# Patient Record
Sex: FEMALE | Race: WHITE | NOT HISPANIC OR LATINO | Employment: FULL TIME | URBAN - METROPOLITAN AREA
[De-identification: names, ages, dates, MRNs, and addresses within clinical notes are randomized per-mention and may not be internally consistent; named-entity substitution may affect disease eponyms.]

---

## 2017-02-08 ENCOUNTER — ALLSCRIPTS OFFICE VISIT (OUTPATIENT)
Dept: OTHER | Facility: OTHER | Age: 62
End: 2017-02-08

## 2017-02-08 DIAGNOSIS — E11.9 TYPE 2 DIABETES MELLITUS WITHOUT COMPLICATIONS (HCC): ICD-10-CM

## 2017-05-16 ENCOUNTER — ALLSCRIPTS OFFICE VISIT (OUTPATIENT)
Dept: OTHER | Facility: OTHER | Age: 62
End: 2017-05-16

## 2017-05-16 DIAGNOSIS — E11.9 TYPE 2 DIABETES MELLITUS WITHOUT COMPLICATIONS (HCC): ICD-10-CM

## 2017-09-27 ENCOUNTER — ALLSCRIPTS OFFICE VISIT (OUTPATIENT)
Dept: OTHER | Facility: OTHER | Age: 62
End: 2017-09-27

## 2017-09-27 DIAGNOSIS — E78.5 HYPERLIPIDEMIA: ICD-10-CM

## 2017-09-27 DIAGNOSIS — K21.9 GASTRO-ESOPHAGEAL REFLUX DISEASE WITHOUT ESOPHAGITIS: ICD-10-CM

## 2017-09-27 DIAGNOSIS — F41.9 ANXIETY DISORDER: ICD-10-CM

## 2017-09-27 DIAGNOSIS — G25.81 RESTLESS LEGS SYNDROME: ICD-10-CM

## 2017-09-27 DIAGNOSIS — E11.9 TYPE 2 DIABETES MELLITUS WITHOUT COMPLICATIONS (HCC): ICD-10-CM

## 2017-11-02 ENCOUNTER — ALLSCRIPTS OFFICE VISIT (OUTPATIENT)
Dept: OTHER | Facility: OTHER | Age: 62
End: 2017-11-02

## 2017-11-02 DIAGNOSIS — E11.9 TYPE 2 DIABETES MELLITUS WITHOUT COMPLICATIONS (HCC): ICD-10-CM

## 2017-11-03 NOTE — PROGRESS NOTES
Assessment  1  Diabetes (250 00) (E11 9)   2  Hyperlipidemia (272 4) (E78 5)   3  Diabetes mellitus (250 00) (E11 9)   4  Anxiety (300 00) (F41 9)    Plan  Diabetes    · * MAMMO SCREENING BILATERAL W CAD; Status:Hold For - Scheduling; Requested  for:02Nov2017;    · *VB - Eye Exam; Status:Active; Requested BCP:[id number];   PMH: Need for influenza vaccination    · Influenza ; every 6 months; Last 34BVF6832; Next 10VPP1122; Status:Active    Discussion/Summary    Continue regimen  No changes madein 2 months for diabetes follow-up with labs  Labs were ready printed and given to patient at last visit  ready has mammogram scheduled this month and we will received copy of the results  The patient was counseled regarding instructions for management,-- risk factor reductions,-- prognosis,-- impressions,-- risks and benefits of treatment options  total time of encounter was 15 minutes  Possible side effects of new medications were reviewed with the patient/guardian today  The treatment plan was reviewed with the patient/guardian  The patient/guardian understands and agrees with the treatment plan      Chief Complaint  Patient here for follow up screening neg      History of Present Illness  follow-uplast visit patient was switch from Xanax extended-release 2 clonazepam  She was complaining of a lot of dry mouth with Xanax this has now resolved with switching medicine  She is happy with the new medicine tolerating well on controlling her symptoms  no changes      Review of Systems    Constitutional: No fever, no chills, feels well, no tiredness, no recent weight gain or weight loss  Cardiovascular: No complaints of slow heart rate, no fast heart rate, no chest pain, no palpitations, no leg claudication, no lower extremity edema  Respiratory: No complaints of shortness of breath, no wheezing, no cough, no SOB on exertion, no orthopnea, no PND     Gastrointestinal: No complaints of abdominal pain, no constipation, no nausea or vomiting, no diarrhea, no bloody stools  Active Problems  1  Anxiety (300 00) (F41 9)   2  Cervical cancer screening (V76 2) (Z12 4)   3  Diabetes mellitus (250 00) (E11 9)   4  Diabetic eye exam (V72 0,250 00) (E11 9,Z01 00)   5  Eczema (692 9) (L30 9)   6  Encounter for annual routine gynecological examination (V72 31) (Z01 419)   7  Encounter for screening colonoscopy (V76 51) (Z12 11)   8  Encounter for screening mammogram for breast cancer (V76 12) (Z12 31)   9  Gastroesophageal reflux disease (530 81) (K21 9)   10  Hot flashes (627 2) (N95 1)   11  Hyperlipidemia (272 4) (E78 5)   12  Hypertension associated with diabetes (250 80,401 9) (E11 59,I10)   13  Leg cramps (729 82) (R25 2)   14  Need for influenza vaccination (V04 81) (Z23)   15  Need for Tdap vaccination (V06 1) (Z23)   16  Restless legs syndrome (333 94) (G25 81)   17  Skin Abscess Of Axilla (682 3)   18  Skin rash (782 1) (R21)    Past Medical History  1  Diabetes mellitus (250 00) (E11 9)   2  History of Gallbladder disease (575 9) (K82 9)   3  History of constipation (V12 79) (Z87 19)   4  History of insomnia (V13 89) (Z87 898)   5  History of Lives with    6  History of Need for influenza vaccination (V04 81) (Z23)   7  History of Patient has living will (V49 89) (Z78 9)    The active problems and past medical history were reviewed and updated today  Surgical History  1  History of Cholecystectomy   2  History of Nasal Septal Deviation Repair    Family History  Father    1  Family history of Acute Myocardial Infarction (V17 3)  Sister    2  Family history of Emphysema   3  Family history of colonic polyps (V18 51) (Z83 71)   4   Family history of malignant neoplasm (V16 9) (Z80 9)    Social History   · Always uses seat belt   · Being A Social Drinker   · Caffeine use (V49 89) (F15 90)   · Marital History - Currently    · Never A Smoker   · Never Used Drugs   · No illicit drug use   · No known exposure to tobacco smoke   · Uses Safety Equipment - Seatbelts    Current Meds   1  Adult Aspirin EC Low Strength 81 MG Oral Tablet Delayed Release; TAKE 1 TABLET   DAILY; Therapy: [phone]) to Recorded   2  Atorvastatin Calcium 20 MG Oral Tablet; TAKE 1 TABLET DAILY AS DIRECTED; Therapy: 99Ytt4864 to (Evaluate:12Nov2017)  Requested for: 95YXM5133; Last   Rx:61Hdc4519 Ordered   3  Betamethasone Dipropionate Aug 0 05 % External Ointment; APPLY SPARINGLY TO   AFFECTED AREA(S) ONCE DAILY; Therapy: 28VHY0412 to (Last Rx:06Oct2016)  Requested for: 75KVA2052 Ordered   4  Black Cohosh 40 MG Oral Capsule; TAKE 1 CAPSULE TWICE DAILY; Therapy: 80NCC4929 to (Evaluate:06Jan2016)  Requested for: 09DUU4493; Last   Rx:85Jcm6796 Ordered   5  ClonazePAM 0 5 MG Oral Tablet; TAKE 1 TABLET 1-2 times  DAILY as needed; Therapy: 09FAG5814 to (Last Rx:27Sep2017) Ordered   6  Irbesartan 150 MG Oral Tablet; TAKE 1 TABLET DAILY AS DIRECTED; Therapy: 29Jbo5508 to (Evaluate:03Feb2018)  Requested for: 49NXJ6433; Last   Rx:23Abn9259 Ordered   7  MetFORMIN HCl - 500 MG Oral Tablet; TAKE 1 TABLET BY MOUTH IN THE AM AND 2   TABS IN THE PM;   Therapy: 18Ohl5311 to (Evaluate:12May2018)  Requested for: 70TIO4182; Last   Rx:84Ujy4275 Ordered   8  Montelukast Sodium 10 MG Oral Tablet; TAKE 1 TABLET DAILY  Requested for:   98VJW2653; Last Rx:16May2017 Ordered   9  Omeprazole 40 MG Oral Capsule Delayed Release; TAKE 1 CAPSULE BY MOUTH   EVERY DAY; Therapy: 90PNY7305 to (Evaluate:12Nov2017)  Requested for: 64VKW6740; Last   Rx:16May2017 Ordered   10  ROPINIRole HCl - 2 MG Oral Tablet; TAKE 1 TABLET AT BEDTIME; Therapy: (Recorded:27Sep2017) to Recorded    The medication list was reviewed and updated today  Allergies  1  Keflex TABS  2   Tape    Vitals  Vital Signs    Recorded: 08XGI1625 05:14PM   Temperature 97 4 F   Heart Rate 97   Respiration 20   Systolic 853   Diastolic 80   Height 5 ft 8 9 in   Weight 198 lb    BMI Calculated 29 32   BSA Calculated 2 05   O2 Saturation 99   Pain Scale 4     Physical Exam    Constitutional   General appearance: No acute distress, well appearing and well nourished  Pulmonary   Respiratory effort: No increased work of breathing or signs of respiratory distress  Auscultation of lungs: Clear to auscultation  Cardiovascular   Auscultation of heart: Normal rate and rhythm, normal S1 and S2, without murmurs  Examination of extremities for edema and/or varicosities: Normal     Psychiatric   Mood and affect: Normal          Results/Data  PHQ-2 Adult Depression Screening 04IZU4131 05:21PM User, s     Test Name Result Flag Reference   PHQ-2 Adult Depression Score 0     Over the last two weeks, how often have you been bothered by any of the following problems? Little interest or pleasure in doing things: Not at all - 0  Feeling down, depressed, or hopeless: Not at all - 0   PHQ-2 Adult Depression Screening Negative         Health Management  Encounter for screening colonoscopy   COLONOSCOPY (GI, SURG); every 5 years; Last 21Xui0712; Next Due: 26Htx0398; Active  History of Need for influenza vaccination   Influenza; every 6 months; Last 81ICY6868; Next Due: 37QLC8998; Active    Future Appointments    Date/Time Provider Specialty Site   01/04/2018 05:00 PM SHERYL Mckenzie   Family [facility] [address]     Signatures   Electronically signed by : SHERYL Jeronimo ; Nov 2 2017  5:49PM EST                       (Author)

## 2017-11-22 ENCOUNTER — GENERIC CONVERSION - ENCOUNTER (OUTPATIENT)
Dept: FAMILY MEDICINE CLINIC | Facility: CLINIC | Age: 62
End: 2017-11-22

## 2018-01-11 NOTE — RESULT NOTES
Message   Patient's hemoglobin A1c slightly elevated please schedule follow-up visit     Verified Results  (1) HEMOGLOBIN A1C 47YIB7927 11:16AM Emory Zaragoza Citizen     Test Name Result Flag Reference   HEMOGLOBIN A1C 7 1        Summary / No summary entered :      No summary entered   Documents attached :      Tracie Dann Trisha Corporal: [id number] - Image Encounter - Anna Pate - (Internal Medicine) (Additional Information Document)    Signatures   Electronically signed by : Ramon Beltrán MD; Feb 19 2016  2:13PM EST                       (Author)

## 2018-01-13 VITALS
WEIGHT: 197 LBS | HEIGHT: 65 IN | RESPIRATION RATE: 20 BRPM | OXYGEN SATURATION: 96 % | SYSTOLIC BLOOD PRESSURE: 130 MMHG | TEMPERATURE: 97.8 F | BODY MASS INDEX: 32.82 KG/M2 | HEART RATE: 126 BPM | DIASTOLIC BLOOD PRESSURE: 90 MMHG

## 2018-01-13 VITALS
BODY MASS INDEX: 29.33 KG/M2 | DIASTOLIC BLOOD PRESSURE: 80 MMHG | HEART RATE: 97 BPM | OXYGEN SATURATION: 99 % | TEMPERATURE: 97.4 F | HEIGHT: 69 IN | SYSTOLIC BLOOD PRESSURE: 120 MMHG | WEIGHT: 198 LBS | RESPIRATION RATE: 20 BRPM

## 2018-01-13 VITALS
HEART RATE: 84 BPM | RESPIRATION RATE: 18 BRPM | WEIGHT: 189 LBS | SYSTOLIC BLOOD PRESSURE: 120 MMHG | DIASTOLIC BLOOD PRESSURE: 72 MMHG | BODY MASS INDEX: 32.27 KG/M2 | OXYGEN SATURATION: 98 % | HEIGHT: 64 IN

## 2018-01-18 NOTE — PROGRESS NOTES
Assessment    1  Anxiety (300 00) (F41 9)   2  Diabetes mellitus (250 00) (E11 9)   3  Leg cramps (729 82) (R25 2)   4  Hypertension associated with diabetes (250 80,401 9) (E11 59,I10)   5  Restless legs syndrome (333 94) (G25 81)    Plan  Anxiety    · ALPRAZolam XR 1 MG Oral Tablet Extended Release 24 Hour; TAKE 1 TABLET BY MOUTH  EVERY DAY AS DIRECTED  Cough    · Montelukast Sodium 10 MG Oral Tablet (Singulair); TAKE 1 TABLET DAILY  Diabetes mellitus    · MetFORMIN HCl - 500 MG Oral Tablet; TAKE 1 TABLET BY MOUTH IN THE AM AND 2 TABS IN  THE PM   · (1) HEMOGLOBIN A1C; Status:Active; Requested for:04Xvu8826;   Gastroesophageal reflux disease    · Omeprazole 40 MG Oral Capsule Delayed Release (PriLOSEC); take 1 capsule by mouth  once daily  Hyperlipidemia    · Atorvastatin Calcium 20 MG Oral Tablet; TAKE 1 TABLET DAILY AS DIRECTED  Hypertension associated with diabetes    · Irbesartan 150 MG Oral Tablet (Avapro); TAKE 1 TABLET DAILY AS DIRECTED  Restless legs syndrome    · ROPINIRole HCl - 0 25 MG Oral Tablet; initally take 1 tablet 1-3 hours before bedtime then  increase to 2 tablets on day 3-7  After 1 week, can increase to 4 tablets    Discussion/Summary  Discussion Summary:   1  Anxiety-continue current medications  2  Diabetes-CONTROLLED on her current medications are most recent A1c was 6 3% In October 2016  We will recheck a more recent value  3 Leg cramps-patient states that these did improve with taking a potassium supplement  She still did not get the BMP done that was ordered last visit and she will get that done  4  Restless leg syndrome-will try a prescription for ropinirole  Patient is to take as directed  5  Hypertension-well-controlled on current medications  Goals and Barriers: The patient has the current Goals: Take medications as prescribed  Get labs done  Follow-up in 3 months  The patent has the current Barriers: Patient is dealing with a lot of anxiety and stressors at work and home  Patient's Capacity to Self-Care: Patient is able to Self-Care  Self Referrals:   Self Referrals: No      Chief Complaint  Chief Complaint Free Text Note Form: Patient is here for 4 month follow-up for Anxiety, DM, and hyperlipidemia  She is requesting a refill for Alprazolam XR 1 mg      History of Present Illness  HPI: 24-year-old female here for follow-up appointment on her diabetes, high blood pressure and anxiety  She states that her blood sugars have been well controlled on her current medications  Her blood pressure has been well-controlled as well  She denies any chest pain, shortness of breath, headaches, swelling or legs or palpitations  She states that her anxiety is okay and premature controlled on her medications  She has a lot of situational stressors at work and at home that are aggravating her  Overall she is doing okay  She denies any suicidal or homicidal ideations  She is complaining of restless leg type symptoms where it is very difficult to calm her legs so that way she can get rest  She states that she always has been moving around and they feel very uncomfortable when she sits down at night  Review of Systems  Complete-Female:   Constitutional: No fever, no chills, feels well, no tiredness, no recent weight gain or weight loss  ENT: no complaints of earache, no loss of hearing, no nose bleeds, no nasal discharge, no sore throat, no hoarseness  Cardiovascular: No complaints of slow heart rate, no fast heart rate, no chest pain, no palpitations, no leg claudication, no lower extremity edema  Respiratory: No complaints of shortness of breath, no wheezing, no cough, no SOB on exertion, no orthopnea, no PND  Gastrointestinal: No complaints of abdominal pain, no constipation, no nausea or vomiting, no diarrhea, no bloody stools  Genitourinary: No complaints of dysuria, no incontinence, no pelvic pain, no dysmenorrhea, no vaginal discharge or bleeding     Musculoskeletal: as noted in HPI  ROS Reviewed:   ROS reviewed  Active Problems    1  Anxiety (300 00) (F41 9)   2  Cervical cancer screening (V76 2) (Z12 4)   3  Cough (786 2) (R05)   4  Diabetes mellitus (250 00) (E11 9)   5  Diabetic eye exam (V72 0,250 00) (E11 9,Z01 00)   6  Eczema (692 9) (L30 9)   7  Encounter for annual routine gynecological examination (V72 31) (Z01 419)   8  Encounter for screening colonoscopy (V76 51) (Z12 11)   9  Encounter for screening mammogram for breast cancer (V76 12) (Z12 31)   10  Gastroesophageal reflux disease (530 81) (K21 9)   11  Hot flashes (627 2) (N95 1)   12  Hyperlipidemia (272 4) (E78 5)   13  Hypertension associated with diabetes (250 80,401 9) (E11 59,I10)   14  Leg cramps (729 82) (R25 2)   15  Need for influenza vaccination (V04 81) (Z23)   16  Need for Tdap vaccination (V06 1) (Z23)   17  Skin Abscess Of Axilla (682 3)    Past Medical History    1  History of Gallbladder disease (575 9) (K82 9)   2  History of Need for influenza vaccination (V04 81) (Z23)  Active Problems And Past Medical History Reviewed: The active problems and past medical history were reviewed and updated today  Surgical History    1  History of Cholecystectomy   2  History of Nasal Septal Deviation Repair  Surgical History Reviewed: The surgical history was reviewed and updated today  Family History  Father    1  Family history of Acute Myocardial Infarction (V17 3)  Sister    2  Family history of Emphysema   3  Family history of colonic polyps (V18 51) (Z83 71)  Family History Reviewed: The family history was reviewed and updated today  Social History    · Being A Social Drinker   · Marital History - Currently    · Never A Smoker   · Never Used Drugs   · Uses Safety Equipment - Seatbelts  Social History Reviewed: The social history was reviewed and updated today  The social history was reviewed and is unchanged  Current Meds   1   Adult Aspirin EC Low Strength 81 MG Oral Tablet Delayed Release; TAKE 1 TABLET DAILY; Therapy: ([phone]) to Recorded   2  ALPRAZolam XR 1 MG Oral Tablet Extended Release 24 Hour; TAKE 1 TABLET BY MOUTH EVERY   DAY AS DIRECTED; Therapy: 68LVC5566 to (Evaluate:02Krq2905)  Requested for: 56RHA3182; Last Rx:12Jan2017   Ordered   3  Atorvastatin Calcium 20 MG Oral Tablet; TAKE 1 TABLET DAILY AS DIRECTED; Therapy: 23Odl0705 to (Evaluate:62Bop7739)  Requested for: 06UNL2234; Last Rx:12Jan2017   Ordered   4  Betamethasone Dipropionate Aug 0 05 % External Ointment; APPLY SPARINGLY TO AFFECTED   AREA(S) ONCE DAILY; Therapy: 80UPJ2619 to (Last Rx:06Oct2016)  Requested for: 92ADV8952 Ordered   5  Black Cohosh 40 MG Oral Capsule; TAKE 1 CAPSULE TWICE DAILY; Therapy: 55WEA5166 to (Evaluate:06Jan2016)  Requested for: 56ZAU7261; Last Rx:49Nln8470   Ordered   6  Clobetasol Propionate 0 05 % External Shampoo; MASSAGE ONTO WET SCALP  LEAVE LATHER ON   FOR 3 MINUTES, THEN RINSE  REPEAT THE APPLICATION ONCE  USE TWICE PER WEEK; Therapy: 21Jun2016 to (Evaluate:18Oct2016)  Requested for: 34SKL5122; Last Rx:06Oct2016   Ordered   7  Irbesartan 150 MG Oral Tablet; TAKE 1 TABLET DAILY AS DIRECTED; Therapy: 87Jtq4942 to (Glenroy Michel)  Requested for: 93GFY9980; Last Rx:06Oct2016   Ordered   8  MetFORMIN HCl - 500 MG Oral Tablet; TAKE 1 TABLET BY MOUTH IN THE AM AND 2 TABS IN THE PM;   Therapy: 61Axr5801 to (Rajesh Balloon)  Requested for: 22AIM2401; Last Rx:27Xtt0303   Ordered   9  Montelukast Sodium 10 MG Oral Tablet; TAKE 1 TABLET DAILY  Requested for: 30MYQ9635; Last   Rx:06Oct2016 Ordered   10  Omeprazole 40 MG Oral Capsule Delayed Release; take 1 capsule by mouth once daily; Therapy: 46EGC0703 to (Evaluate:01Oct2017)  Requested for: 60VPW5898; Last Rx:06Oct2016    Ordered   11  Zantac 150 MG CAPS; Therapy: (Alexandra Trimble) to Recorded  Medication List Reviewed: The medication list was reviewed and updated today  Allergies    1  Keflex TABS    2  Tape    Vitals  Vital Signs    Recorded: 50NTY3313 04:07PM   Heart Rate 96, L Radial   Pulse Quality Regular, L Radial   Respiration Quality Normal   Respiration 18   Systolic 687, LUE, Sitting   Diastolic 80, LUE, Sitting   BP CUFF SIZE Large   Height 5 ft 4 in   Weight 184 lb    BMI Calculated 31 58   BSA Calculated 1 89   O2 Saturation 97, RA     Physical Exam    Constitutional   General appearance: No acute distress, well appearing and well nourished  Ears, Nose, Mouth, and Throat   External inspection of ears and nose: Normal     Otoscopic examination: Tympanic membranes translucent with normal light reflex  Canals patent without erythema  Nasal mucosa, septum, and turbinates: Normal without edema or erythema  Oropharynx: Normal with no erythema, edema, exudate or lesions  Pulmonary   Respiratory effort: No increased work of breathing or signs of respiratory distress  Auscultation of lungs: Clear to auscultation  Cardiovascular   Auscultation of heart: Normal rate and rhythm, normal S1 and S2, without murmurs  Neurologic   Reflexes: 2+ and symmetric  Health Management  Encounter for screening colonoscopy   COLONOSCOPY (GI, SURG); every 5 years; Last 44Idr8889; Next Due: 84Nzl0827; Active  History of Need for influenza vaccination   Influenza; every 6 months; Last 96RHI1352; Next Due: 40HET0758;  Active    Signatures   Electronically signed by : Dasha Valenzuela, [facility]; Feb 9 2017 12:52PM EST                       (Author)

## 2018-01-22 VITALS
DIASTOLIC BLOOD PRESSURE: 80 MMHG | OXYGEN SATURATION: 97 % | HEART RATE: 96 BPM | RESPIRATION RATE: 18 BRPM | SYSTOLIC BLOOD PRESSURE: 124 MMHG | HEIGHT: 64 IN | BODY MASS INDEX: 31.41 KG/M2 | WEIGHT: 184 LBS

## 2018-01-30 LAB
ALBUMIN SERPL-MCNC: 4.2 G/DL (ref 3.6–5.1)
ALBUMIN/CREAT UR: 53 MCG/MG CREAT
ALBUMIN/GLOB SERPL: 1.8 (CALC) (ref 1–2.5)
ALP SERPL-CCNC: 130 U/L (ref 33–130)
ALT SERPL-CCNC: 41 U/L (ref 6–29)
AST SERPL-CCNC: 29 U/L (ref 10–35)
BASOPHILS # BLD AUTO: 82 CELLS/UL (ref 0–200)
BASOPHILS NFR BLD AUTO: 0.9 %
BILIRUB SERPL-MCNC: 0.6 MG/DL (ref 0.2–1.2)
BUN SERPL-MCNC: 23 MG/DL (ref 7–25)
BUN/CREAT SERPL: ABNORMAL (CALC) (ref 6–22)
CALCIUM SERPL-MCNC: 9.8 MG/DL (ref 8.6–10.4)
CHLORIDE SERPL-SCNC: 101 MMOL/L (ref 98–110)
CHOLEST SERPL-MCNC: 146 MG/DL
CHOLEST/HDLC SERPL: 3.4 (CALC)
CO2 SERPL-SCNC: 23 MMOL/L (ref 20–31)
CREAT SERPL-MCNC: 0.93 MG/DL (ref 0.5–0.99)
CREAT UR-MCNC: 30 MG/DL (ref 20–320)
EOSINOPHIL # BLD AUTO: 774 CELLS/UL (ref 15–500)
EOSINOPHIL NFR BLD AUTO: 8.5 %
ERYTHROCYTE [DISTWIDTH] IN BLOOD BY AUTOMATED COUNT: 12.5 % (ref 11–15)
GLOBULIN SER CALC-MCNC: 2.4 G/DL (CALC) (ref 1.9–3.7)
GLUCOSE SERPL-MCNC: 90 MG/DL (ref 65–99)
HBA1C MFR BLD: 6.1 % OF TOTAL HGB
HCT VFR BLD AUTO: 40.8 % (ref 35–45)
HDLC SERPL-MCNC: 43 MG/DL
HGB BLD-MCNC: 13.5 G/DL (ref 11.7–15.5)
LDLC SERPL CALC-MCNC: 76 MG/DL (CALC)
LYMPHOCYTES # BLD AUTO: 2120 CELLS/UL (ref 850–3900)
LYMPHOCYTES NFR BLD AUTO: 23.3 %
MCH RBC QN AUTO: 30.5 PG (ref 27–33)
MCHC RBC AUTO-ENTMCNC: 33.1 G/DL (ref 32–36)
MCV RBC AUTO: 92.1 FL (ref 80–100)
MICROALBUMIN UR-MCNC: 1.6 MG/DL
MONOCYTES # BLD AUTO: 792 CELLS/UL (ref 200–950)
MONOCYTES NFR BLD AUTO: 8.7 %
NEUTROPHILS # BLD AUTO: 5333 CELLS/UL (ref 1500–7800)
NEUTROPHILS NFR BLD AUTO: 58.6 %
NONHDLC SERPL-MCNC: 103 MG/DL (CALC)
PLATELET # BLD AUTO: 356 THOUSAND/UL (ref 140–400)
PMV BLD REES-ECKER: 10.1 FL (ref 7.5–12.5)
POTASSIUM SERPL-SCNC: 4.6 MMOL/L (ref 3.5–5.3)
PROT SERPL-MCNC: 6.6 G/DL (ref 6.1–8.1)
RBC # BLD AUTO: 4.43 MILLION/UL (ref 3.8–5.1)
SL AMB EGFR AFRICAN AMERICAN: 77 ML/MIN/1.73M2
SL AMB EGFR NON AFRICAN AMERICAN: 67 ML/MIN/1.73M2
SODIUM SERPL-SCNC: 138 MMOL/L (ref 135–146)
TRIGL SERPL-MCNC: 175 MG/DL
WBC # BLD AUTO: 9.1 THOUSAND/UL (ref 3.8–10.8)

## 2018-02-14 ENCOUNTER — OFFICE VISIT (OUTPATIENT)
Dept: FAMILY MEDICINE CLINIC | Facility: CLINIC | Age: 63
End: 2018-02-14
Payer: COMMERCIAL

## 2018-02-14 VITALS
SYSTOLIC BLOOD PRESSURE: 130 MMHG | WEIGHT: 201 LBS | HEART RATE: 112 BPM | BODY MASS INDEX: 33.49 KG/M2 | OXYGEN SATURATION: 97 % | TEMPERATURE: 98.4 F | HEIGHT: 65 IN | DIASTOLIC BLOOD PRESSURE: 92 MMHG

## 2018-02-14 DIAGNOSIS — E78.5 HYPERLIPIDEMIA, UNSPECIFIED HYPERLIPIDEMIA TYPE: ICD-10-CM

## 2018-02-14 DIAGNOSIS — N95.1 HOT FLASHES DUE TO MENOPAUSE: ICD-10-CM

## 2018-02-14 DIAGNOSIS — K21.9 GASTROESOPHAGEAL REFLUX DISEASE WITHOUT ESOPHAGITIS: ICD-10-CM

## 2018-02-14 DIAGNOSIS — F41.9 ANXIETY: ICD-10-CM

## 2018-02-14 DIAGNOSIS — E11.59 HYPERTENSION ASSOCIATED WITH DIABETES (HCC): ICD-10-CM

## 2018-02-14 DIAGNOSIS — I15.2 HYPERTENSION ASSOCIATED WITH DIABETES (HCC): ICD-10-CM

## 2018-02-14 DIAGNOSIS — G25.81 RESTLESS LEGS SYNDROME: ICD-10-CM

## 2018-02-14 DIAGNOSIS — L30.9 ECZEMA, UNSPECIFIED TYPE: ICD-10-CM

## 2018-02-14 DIAGNOSIS — E11.8 TYPE 2 DIABETES MELLITUS WITH COMPLICATION, WITHOUT LONG-TERM CURRENT USE OF INSULIN (HCC): Primary | ICD-10-CM

## 2018-02-14 PROBLEM — E11.9 DIABETES (HCC): Status: ACTIVE | Noted: 2017-11-02

## 2018-02-14 PROBLEM — E11.9 DIABETES (HCC): Status: RESOLVED | Noted: 2017-11-02 | Resolved: 2018-02-14

## 2018-02-14 PROCEDURE — 99214 OFFICE O/P EST MOD 30 MIN: CPT | Performed by: FAMILY MEDICINE

## 2018-02-14 RX ORDER — OMEPRAZOLE 40 MG/1
1 CAPSULE, DELAYED RELEASE ORAL DAILY
COMMUNITY
Start: 2016-02-26 | End: 2018-02-14 | Stop reason: SDUPTHER

## 2018-02-14 RX ORDER — CLONAZEPAM 0.5 MG/1
TABLET ORAL
COMMUNITY
Start: 2017-09-27 | End: 2018-02-14 | Stop reason: SDUPTHER

## 2018-02-14 RX ORDER — SERTRALINE HYDROCHLORIDE 25 MG/1
25 TABLET, FILM COATED ORAL DAILY
Qty: 30 TABLET | Refills: 5 | Status: SHIPPED | OUTPATIENT
Start: 2018-02-14 | End: 2018-07-06 | Stop reason: SDUPTHER

## 2018-02-14 RX ORDER — BETAMETHASONE DIPROPIONATE 0.5 MG/G
OINTMENT TOPICAL DAILY
COMMUNITY
Start: 2016-10-06 | End: 2019-02-11 | Stop reason: ALTCHOICE

## 2018-02-14 RX ORDER — OMEPRAZOLE 40 MG/1
40 CAPSULE, DELAYED RELEASE ORAL DAILY
Refills: 1 | COMMUNITY
Start: 2017-12-19 | End: 2018-05-15

## 2018-02-14 RX ORDER — IRBESARTAN 150 MG/1
150 TABLET ORAL DAILY
Refills: 3 | COMMUNITY
Start: 2017-11-29 | End: 2018-12-07 | Stop reason: SDUPTHER

## 2018-02-14 RX ORDER — GARLIC 180 MG
1 TABLET, DELAYED RELEASE (ENTERIC COATED) ORAL 2 TIMES DAILY
COMMUNITY
Start: 2015-12-07 | End: 2020-02-26 | Stop reason: ALTCHOICE

## 2018-02-14 RX ORDER — ROPINIROLE 4 MG/1
2 TABLET, FILM COATED ORAL
Refills: 1 | COMMUNITY
Start: 2017-11-27 | End: 2018-05-30 | Stop reason: SDUPTHER

## 2018-02-14 RX ORDER — CLONAZEPAM 0.5 MG/1
0.5 TABLET ORAL 2 TIMES DAILY
Qty: 60 TABLET | Refills: 0 | Status: SHIPPED | OUTPATIENT
Start: 2018-02-14 | End: 2018-04-19 | Stop reason: SDUPTHER

## 2018-02-14 NOTE — PROGRESS NOTES
Assessment/Plan:    No problem-specific Assessment & Plan notes found for this encounter  Diagnoses and all orders for this visit:    Type 2 diabetes mellitus with complication, without long-term current use of insulin (HCC)  -     metFORMIN (GLUCOPHAGE) 500 mg tablet; Take 1 tablet (500 mg total) by mouth 2 (two) times a day with meals    Hyperlipidemia, unspecified hyperlipidemia type    Hypertension associated with diabetes (HCC)  -     irbesartan (AVAPRO) 150 mg tablet; Take 150 mg by mouth daily    Anxiety  -     Discontinue: clonazePAM (KlonoPIN) 0 5 mg tablet; Take by mouth  -     clonazePAM (KlonoPIN) 0 5 mg tablet; Take 1 tablet (0 5 mg total) by mouth 2 (two) times a day  -     sertraline (ZOLOFT) 25 mg tablet; Take 1 tablet (25 mg total) by mouth daily    Restless legs syndrome  -     rOPINIRole (REQUIP) 4 mg tablet; 2 mg daily at bedtime     Hot flashes due to menopause  -     Black Cohosh 40 MG CAPS; Take 1 capsule by mouth 2 (two) times a day    Eczema, unspecified type  -     betamethasone, augmented, (DIPROLENE) 0 05 % ointment; Apply topically daily    Gastroesophageal reflux disease without esophagitis  -     omeprazole (PriLOSEC) 40 MG capsule; Take 40 mg by mouth daily    Other orders  -     Discontinue: omeprazole (PriLOSEC) 40 MG capsule; Take 1 capsule by mouth daily          Subjective: Patient is here for a 2 month follow up with labs   Patient was started on Clonazepam 0 5 mg patient says she doing well on this med but still a little feel a little nervous  Patient ID: Monet Turner is a 63 y o  female  HPI     Pt presents for chronic cond follow up + labs    DM- stable a1c 6 1   HTN- stable  Anxiety- taking clonopin 0 5mg 2 tabs Qam  States feels like in the Pms she starts feeling anxious again  Labs discussed with pt and stable    The following portions of the patient's history were reviewed and updated as appropriate: allergies, current medications, past family history, past medical history, past social history, past surgical history and problem list     Review of Systems   Constitutional: Negative for activity change and appetite change  HENT: Negative  Respiratory: Negative  Cardiovascular: Negative  Gastrointestinal: Negative  Genitourinary: Negative  Musculoskeletal: Negative for arthralgias  Skin: Negative for rash  Psychiatric/Behavioral: The patient is nervous/anxious  Objective: There were no vitals filed for this visit  Physical Exam   Constitutional: She is oriented to person, place, and time  She appears well-developed and well-nourished  Cardiovascular: Normal rate, regular rhythm and normal heart sounds  Pulses:       Dorsalis pedis pulses are 1+ on the right side, and 1+ on the left side  Pulmonary/Chest: Effort normal and breath sounds normal    Feet:   Right Foot:   Skin Integrity: Negative for ulcer, skin breakdown, erythema, warmth, callus or dry skin  Left Foot:   Skin Integrity: Negative for ulcer, skin breakdown, erythema, warmth, callus or dry skin  Neurological: She is alert and oriented to person, place, and time  Psychiatric: She has a normal mood and affect  Her behavior is normal          Diabetic Foot Exam    Right Foot/Ankle   Right Foot Inspection  Skin Exam: skin normal and skin intact no dry skin, no warmth, no callus, no erythema, no maceration, no abnormal color, no pre-ulcer, no ulcer and no callus                            Sensory       Monofilament testing: intact  Vascular    The right DP pulse is 1+  Left Foot/Ankle  Left Foot Inspection  Skin Exam: skin normal and skin intactno dry skin, no warmth, no erythema, no maceration, normal color, no pre-ulcer, no ulcer and no callus                                         Sensory       Monofilament: intact  Vascular    The left DP pulse is 1+     Assign Risk Category:  No deformity present; ;        Risk: 0

## 2018-04-09 ENCOUNTER — TELEPHONE (OUTPATIENT)
Dept: FAMILY MEDICINE CLINIC | Facility: CLINIC | Age: 63
End: 2018-04-09

## 2018-04-09 NOTE — TELEPHONE ENCOUNTER
Received message from Jarret Alarcon at [facility] [id number] requesting a change for patient from atorvastatin 20 mg to simvastatin 40 mg due to pricing, please advise

## 2018-04-10 DIAGNOSIS — E78.5 HYPERLIPIDEMIA, UNSPECIFIED HYPERLIPIDEMIA TYPE: Primary | ICD-10-CM

## 2018-04-10 DIAGNOSIS — E11.8 TYPE 2 DIABETES MELLITUS WITH COMPLICATION, WITHOUT LONG-TERM CURRENT USE OF INSULIN (HCC): ICD-10-CM

## 2018-04-10 RX ORDER — SIMVASTATIN 40 MG
40 TABLET ORAL
Qty: 90 TABLET | Refills: 1 | Status: SHIPPED | OUTPATIENT
Start: 2018-04-10 | End: 2018-05-15 | Stop reason: SINTOL

## 2018-04-10 NOTE — TELEPHONE ENCOUNTER
Spoke with patient she approves of changes as long as you agree  States pharmacy also received a letter from [employer]smar stacy about changes and savings for medication

## 2018-04-19 DIAGNOSIS — F41.9 ANXIETY: ICD-10-CM

## 2018-04-19 RX ORDER — CLONAZEPAM 0.5 MG/1
TABLET ORAL
Qty: 60 TABLET | Refills: 0 | Status: SHIPPED | OUTPATIENT
Start: 2018-04-19 | End: 2018-05-18 | Stop reason: SDUPTHER

## 2018-04-23 DIAGNOSIS — F41.9 ANXIETY: Primary | ICD-10-CM

## 2018-04-23 RX ORDER — CLONAZEPAM 1 MG/1
TABLET, ORALLY DISINTEGRATING ORAL
Qty: 20 TABLET | Refills: 0 | Status: SHIPPED | OUTPATIENT
Start: 2018-04-23 | End: 2018-05-15 | Stop reason: DRUGHIGH

## 2018-04-30 ENCOUNTER — TELEPHONE (OUTPATIENT)
Dept: FAMILY MEDICINE CLINIC | Facility: CLINIC | Age: 63
End: 2018-04-30

## 2018-04-30 NOTE — TELEPHONE ENCOUNTER
Pt called and left message stating that after taking Zocor for 2 days her ankles swelled up    Pt stopped taking Zocor and started taking Lipitor again     Pt wants to go back on Lipitor      Please advise

## 2018-05-13 LAB
BUN SERPL-MCNC: 12 MG/DL (ref 7–25)
BUN/CREAT SERPL: ABNORMAL (CALC) (ref 6–22)
CALCIUM SERPL-MCNC: 10.3 MG/DL (ref 8.6–10.4)
CHLORIDE SERPL-SCNC: 104 MMOL/L (ref 98–110)
CO2 SERPL-SCNC: 25 MMOL/L (ref 20–31)
CREAT SERPL-MCNC: 0.9 MG/DL (ref 0.5–0.99)
GLUCOSE SERPL-MCNC: 105 MG/DL (ref 65–99)
HBA1C MFR BLD: 6.7 % OF TOTAL HGB
POTASSIUM SERPL-SCNC: 4.9 MMOL/L (ref 3.5–5.3)
SL AMB EGFR AFRICAN AMERICAN: 81 ML/MIN/1.73M2
SL AMB EGFR NON AFRICAN AMERICAN: 69 ML/MIN/1.73M2
SODIUM SERPL-SCNC: 139 MMOL/L (ref 135–146)
TSH SERPL-ACNC: 3.23 MIU/L (ref 0.4–4.5)

## 2018-05-15 ENCOUNTER — OFFICE VISIT (OUTPATIENT)
Dept: FAMILY MEDICINE CLINIC | Facility: CLINIC | Age: 63
End: 2018-05-15
Payer: COMMERCIAL

## 2018-05-15 VITALS
OXYGEN SATURATION: 98 % | BODY MASS INDEX: 33.82 KG/M2 | WEIGHT: 203 LBS | TEMPERATURE: 97.7 F | DIASTOLIC BLOOD PRESSURE: 64 MMHG | HEART RATE: 104 BPM | HEIGHT: 65 IN | SYSTOLIC BLOOD PRESSURE: 122 MMHG

## 2018-05-15 DIAGNOSIS — E11.59 HYPERTENSION ASSOCIATED WITH DIABETES (HCC): ICD-10-CM

## 2018-05-15 DIAGNOSIS — J30.9 ALLERGIC RHINITIS, UNSPECIFIED SEASONALITY, UNSPECIFIED TRIGGER: ICD-10-CM

## 2018-05-15 DIAGNOSIS — E78.5 HYPERLIPIDEMIA, UNSPECIFIED HYPERLIPIDEMIA TYPE: ICD-10-CM

## 2018-05-15 DIAGNOSIS — R09.82 PND (POST-NASAL DRIP): ICD-10-CM

## 2018-05-15 DIAGNOSIS — R05.3 CHRONIC COUGH: ICD-10-CM

## 2018-05-15 DIAGNOSIS — F41.9 ANXIETY: ICD-10-CM

## 2018-05-15 DIAGNOSIS — I15.2 HYPERTENSION ASSOCIATED WITH DIABETES (HCC): ICD-10-CM

## 2018-05-15 DIAGNOSIS — K21.9 GASTROESOPHAGEAL REFLUX DISEASE WITHOUT ESOPHAGITIS: Primary | ICD-10-CM

## 2018-05-15 DIAGNOSIS — E11.8 TYPE 2 DIABETES MELLITUS WITH COMPLICATION, WITHOUT LONG-TERM CURRENT USE OF INSULIN (HCC): ICD-10-CM

## 2018-05-15 DIAGNOSIS — G25.81 RESTLESS LEGS SYNDROME: ICD-10-CM

## 2018-05-15 PROCEDURE — 99214 OFFICE O/P EST MOD 30 MIN: CPT | Performed by: FAMILY MEDICINE

## 2018-05-15 PROCEDURE — 3008F BODY MASS INDEX DOCD: CPT | Performed by: FAMILY MEDICINE

## 2018-05-15 RX ORDER — BENZONATATE 200 MG/1
200 CAPSULE ORAL 3 TIMES DAILY PRN
Qty: 20 CAPSULE | Refills: 0 | Status: SHIPPED | OUTPATIENT
Start: 2018-05-15 | End: 2019-01-10 | Stop reason: ALTCHOICE

## 2018-05-15 RX ORDER — FEXOFENADINE HCL 180 MG/1
180 TABLET ORAL DAILY
COMMUNITY
End: 2019-02-11

## 2018-05-15 NOTE — PROGRESS NOTES
Assessment/Plan:    No problem-specific Assessment & Plan notes found for this encounter  Diagnoses and all orders for this visit:    Gastroesophageal reflux disease without esophagitis  -     HEMOGLOBIN A1C W/ EAG ESTIMATION; Future  -     Microalbumin / creatinine urine ratio  -     Basic metabolic panel; Future  -     Lipid Panel with Direct LDL reflex; Future    Hypertension associated with diabetes ([address] )  -     HEMOGLOBIN A1C W/ EAG ESTIMATION; Future  -     Microalbumin / creatinine urine ratio  -     Basic metabolic panel; Future  -     Lipid Panel with Direct LDL reflex; Future    Type 2 diabetes mellitus with complication, without long-term current use of insulin ([facility])  -     HEMOGLOBIN A1C W/ EAG ESTIMATION; Future  -     Microalbumin / creatinine urine ratio  -     Basic metabolic panel; Future  -     Lipid Panel with Direct LDL reflex; Future  Labs for next visit    Anxiety  On klonopin    Hyperlipidemia, unspecified hyperlipidemia type  -     HEMOGLOBIN A1C W/ EAG ESTIMATION; Future  -     Microalbumin / creatinine urine ratio  -     Basic metabolic panel; Future  -     Lipid Panel with Direct LDL reflex; Future    Restless legs syndrome  On requip    Chronic cough  -     benzonatate (TESSALON) 200 MG capsule; Take 1 capsule (200 mg total) by mouth 3 (three) times a day as needed for cough  -     PROAIR  (90 Base) MCG/ACT inhaler; Inhale 2 puffs every 4 - 6 hours as needed  Discussed proper use of inhaler  PND (post-nasal drip)    Allergic rhinitis, unspecified seasonality, unspecified trigger  -     fexofenadine (ALLEGRA) 180 MG tablet; Take 180 mg by mouth daily  Cont singulair and flonase  Fu 6 month  Call if any persistent sxs or any worsening  Subjective:   Pt here for follow up visit  Labs done  Pt continues with coughing worse at night     Patient ID: Neda Nesbitt is a 63 y o  female  HPI   Pt presents for follow up and labs     Labs reviewed and discussed with pt      Dm stable  On metformin  Other chronic cond stable  Pt has been having cough for almost a month, + sneezing  She was seen in [facility] last month and was told had bronchitis and prescribed proair with helps a little, azithromycin which she finished      + dry cough, feels phlegm in her throat, some time nighttime wheezing  She already taking Singulair and flonase  The following portions of the patient's history were reviewed and updated as appropriate: allergies, current medications, past family history, past medical history, past social history, past surgical history and problem list     Review of Systems   Constitutional: Negative for activity change and appetite change  Respiratory: Negative  Cardiovascular: Negative  Gastrointestinal: Negative  Genitourinary: Negative  Musculoskeletal: Negative for arthralgias  Skin: Negative for rash  Psychiatric/Behavioral: Negative  Objective:      /64   Pulse 104   Temp 97 7 °F (36 5 °C)   Ht 5' 4 57" (1 64 m)   Wt 92 1 kg (203 lb)   SpO2 98%   BMI 34 24 kg/m²          Physical Exam   Constitutional: She is oriented to person, place, and time  She appears well-developed and well-nourished  No distress  HENT:   Head: Normocephalic  Right Ear: External ear normal    Left Ear: External ear normal    Nose: Nose normal    Mouth/Throat: Oropharynx is clear and moist  No oropharyngeal exudate  Eyes: Conjunctivae are normal    Cardiovascular: Normal rate, regular rhythm and normal heart sounds  Pulmonary/Chest: Effort normal and breath sounds normal  No respiratory distress  She has no wheezes  She has no rales  Lymphadenopathy:     She has no cervical adenopathy  Neurological: She is alert and oriented to person, place, and time  Psychiatric: She has a normal mood and affect   Her behavior is normal          Recent Results (from the past 672 hour(s))   Basic metabolic panel    Collection Time: 05/12/18  9:42 AM   Result Value Ref Range    SL AMB GLUCOSE 105 (H) 65 - 99 mg/dL    BUN 12 7 - 25 mg/dL    Creatinine, Serum 0 90 0 50 - 0 99 mg/dL    eGFR Non  69 > OR = 60 mL/min/1 73m2    SL AMB EGFR  81 > OR = 60 mL/min/1 73m2    SL AMB BUN/CREATININE RATIO NOT APPLICABLE 6 - 22 (calc)    SL AMB SODIUM 139 135 - 146 mmol/L    SL AMB POTASSIUM 4 9 3 5 - 5 3 mmol/L    SL AMB CHLORIDE 104 98 - 110 mmol/L    SL AMB CARBON DIOXIDE 25 20 - 31 mmol/L    SL AMB CALCIUM 10 3 8 6 - 10 4 mg/dL   TSH, 3rd generation with T4 reflex    Collection Time: 05/12/18  9:42 AM   Result Value Ref Range    SL AMB TSH W/ REFLEX TO FREE T4 3 23 0 40 - 4 50 mIU/L   Hemoglobin A1c    Collection Time: 05/12/18  9:42 AM   Result Value Ref Range    Hemoglobin A1C 6 7 (H) <5 7 % of total Hgb

## 2018-05-18 DIAGNOSIS — F41.9 ANXIETY: ICD-10-CM

## 2018-05-18 DIAGNOSIS — E78.5 HYPERLIPIDEMIA, UNSPECIFIED HYPERLIPIDEMIA TYPE: Primary | ICD-10-CM

## 2018-05-19 RX ORDER — CLONAZEPAM 0.5 MG/1
TABLET ORAL
Qty: 60 TABLET | Refills: 0 | Status: SHIPPED | OUTPATIENT
Start: 2018-05-19 | End: 2018-08-21 | Stop reason: SDUPTHER

## 2018-05-19 RX ORDER — ATORVASTATIN CALCIUM 20 MG/1
TABLET, FILM COATED ORAL
Qty: 90 TABLET | Refills: 1 | Status: SHIPPED | OUTPATIENT
Start: 2018-05-19 | End: 2018-12-07 | Stop reason: SDUPTHER

## 2018-05-30 DIAGNOSIS — G25.81 RESTLESS LEGS SYNDROME: ICD-10-CM

## 2018-05-30 DIAGNOSIS — K21.9 GASTROESOPHAGEAL REFLUX DISEASE, ESOPHAGITIS PRESENCE NOT SPECIFIED: Primary | ICD-10-CM

## 2018-05-30 RX ORDER — ROPINIROLE 4 MG/1
2 TABLET, FILM COATED ORAL
Qty: 90 TABLET | Refills: 1 | Status: SHIPPED | OUTPATIENT
Start: 2018-05-30 | End: 2019-01-10 | Stop reason: SDUPTHER

## 2018-05-30 RX ORDER — OMEPRAZOLE 20 MG/1
20 CAPSULE, DELAYED RELEASE ORAL DAILY
Qty: 90 CAPSULE | Refills: 1 | Status: SHIPPED | OUTPATIENT
Start: 2018-05-30 | End: 2018-06-04

## 2018-06-04 ENCOUNTER — TELEPHONE (OUTPATIENT)
Dept: FAMILY MEDICINE CLINIC | Facility: CLINIC | Age: 63
End: 2018-06-04

## 2018-06-04 DIAGNOSIS — K21.9 GASTROESOPHAGEAL REFLUX DISEASE WITHOUT ESOPHAGITIS: Primary | ICD-10-CM

## 2018-06-04 RX ORDER — PANTOPRAZOLE SODIUM 20 MG/1
20 TABLET, DELAYED RELEASE ORAL DAILY
Qty: 90 TABLET | Refills: 1 | Status: SHIPPED | OUTPATIENT
Start: 2018-06-04 | End: 2018-12-07 | Stop reason: SDUPTHER

## 2018-06-08 DIAGNOSIS — F41.9 ANXIETY: ICD-10-CM

## 2018-06-08 RX ORDER — CLONAZEPAM 1 MG/1
TABLET, ORALLY DISINTEGRATING ORAL
Qty: 30 TABLET | Refills: 0 | Status: SHIPPED | OUTPATIENT
Start: 2018-06-08 | End: 2018-07-13 | Stop reason: CLARIF

## 2018-06-12 ENCOUNTER — TELEPHONE (OUTPATIENT)
Dept: FAMILY MEDICINE CLINIC | Facility: CLINIC | Age: 63
End: 2018-06-12

## 2018-07-06 DIAGNOSIS — F41.9 ANXIETY: ICD-10-CM

## 2018-07-06 RX ORDER — SERTRALINE HYDROCHLORIDE 25 MG/1
25 TABLET, FILM COATED ORAL DAILY
Qty: 30 TABLET | Refills: 2 | Status: SHIPPED | OUTPATIENT
Start: 2018-07-06 | End: 2018-09-11 | Stop reason: SDUPTHER

## 2018-07-13 ENCOUNTER — OFFICE VISIT (OUTPATIENT)
Dept: URGENT CARE | Age: 63
End: 2018-07-13
Payer: COMMERCIAL

## 2018-07-13 VITALS
RESPIRATION RATE: 20 BRPM | DIASTOLIC BLOOD PRESSURE: 73 MMHG | OXYGEN SATURATION: 96 % | TEMPERATURE: 98 F | WEIGHT: 203 LBS | BODY MASS INDEX: 33.82 KG/M2 | HEART RATE: 95 BPM | SYSTOLIC BLOOD PRESSURE: 137 MMHG | HEIGHT: 65 IN

## 2018-07-13 DIAGNOSIS — J45.41 MODERATE PERSISTENT ASTHMATIC BRONCHITIS WITH ACUTE EXACERBATION: Primary | ICD-10-CM

## 2018-07-13 DIAGNOSIS — J06.9 ACUTE UPPER RESPIRATORY INFECTION: ICD-10-CM

## 2018-07-13 PROCEDURE — G0382 LEV 3 HOSP TYPE B ED VISIT: HCPCS | Performed by: FAMILY MEDICINE

## 2018-07-13 RX ORDER — LEVOFLOXACIN 500 MG/1
500 TABLET, FILM COATED ORAL EVERY 24 HOURS
Qty: 10 TABLET | Refills: 0 | Status: SHIPPED | OUTPATIENT
Start: 2018-07-13 | End: 2018-07-23

## 2018-07-13 RX ORDER — METHYLPREDNISOLONE 4 MG/1
TABLET ORAL
Qty: 21 TABLET | Refills: 0 | Status: SHIPPED | OUTPATIENT
Start: 2018-07-13 | End: 2019-01-10 | Stop reason: ALTCHOICE

## 2018-07-13 RX ORDER — CLONAZEPAM 1 MG/1
TABLET ORAL
Refills: 0 | COMMUNITY
Start: 2018-06-08 | End: 2019-01-10 | Stop reason: DRUGHIGH

## 2018-07-13 RX ORDER — FLUTICASONE FUROATE AND VILANTEROL 100; 25 UG/1; UG/1
1 POWDER RESPIRATORY (INHALATION) DAILY
Qty: 1 INHALER | Refills: 0 | Status: SHIPPED | OUTPATIENT
Start: 2018-07-13 | End: 2018-08-09 | Stop reason: SDUPTHER

## 2018-07-13 NOTE — PATIENT INSTRUCTIONS
Levaquin once daily until finished (please take probiotics)  Medrol Dosepak as directed (please take with food)  One puff BREO ELLIPTA once or twice a day  Recheck/follow-up with treating physicians  Please go to the hospital emergency department if needed

## 2018-07-13 NOTE — PROGRESS NOTES
[address] [facility]        NAME: Dennis Jameson is a 63 y o  female  : 1955    MRN: [id number]  DATE: 2018  TIME: 6:21 PM    Assessment and Plan   Moderate persistent asthmatic bronchitis with acute exacerbation [[address]]  1  Moderate persistent asthmatic bronchitis with acute exacerbation  Methylprednisolone 4 MG TBPK    fluticasone-vilanterol (BREO ELLIPTA) 100-25 mcg/inh inhaler   2  Acute upper respiratory infection  levofloxacin (LEVAQUIN) 500 mg tablet         Patient Instructions     Patient Instructions   Levaquin once daily until finished (please take probiotics)  Medrol Dosepak as directed (please take with food)  One puff BREO ELLIPTA once or twice a day  Recheck/follow-up with treating physicians  Please go to the hospital emergency department if needed  Follow up with PCP in 3-5 days  Proceed to  ER if symptoms worsen  Chief Complaint     Chief Complaint   Patient presents with    Cough     ongoing cough, cant get comfortable at night  saw family doc, told to take  allergy meds not working  took mucinex dm also not helping  not couging much out mainly dry  wheezing at night inhalers are not helping  [facility]" did CXR which was normal          History of Present Illness       Patient with cough and wheezing since 2018; patient has been seen by multiple physicians without improvement (family physician, various urgent care centers, ENT, pulmonary medicine, allergist); patient states she had something similar a year ago and was given an inhaler that was helpful by ENT (Jayda Vincent)        Review of Systems   Review of Systems   HENT: Positive for congestion  Respiratory: Positive for cough, shortness of breath and wheezing  Cardiovascular: Negative  Musculoskeletal: Negative  Skin: Negative  Neurological: Negative            Current Medications       Current Outpatient Prescriptions:     aspirin 81 MG tablet, Take 81 mg by mouth once  , Disp: , Rfl:     atorvastatin (LIPITOR) 20 mg tablet, TAKE 1 TABLET DAILY AS DIRECTED , Disp: 90 tablet, Rfl: 1    Black Cohosh 40 MG CAPS, Take 1 capsule by mouth 2 (two) times a day, Disp: , Rfl:     calcium-vitamin D (OSCAL) 250-125 MG-UNIT per tablet, Take 1 tablet by mouth daily  , Disp: , Rfl:     clonazePAM (KlonoPIN) 0 5 mg tablet, TAKE 1 TABLET BY MOUTH TWICE A DAY, Disp: 60 tablet, Rfl: 0    clonazePAM (KlonoPIN) 1 mg tablet, TAKE 1/2 (0 5MG) TAB TWICE A DAY AS NEEDED ANXIETY, Disp: , Rfl: 0    fexofenadine (ALLEGRA) 180 MG tablet, Take 180 mg by mouth daily, Disp: , Rfl:     irbesartan (AVAPRO) 150 mg tablet, Take 150 mg by mouth daily, Disp: , Rfl: 3    metFORMIN (GLUCOPHAGE) 500 mg tablet, Take 1 tablet (500 mg total) by mouth 2 (two) times a day with meals, Disp: 180 tablet, Rfl: 1    montelukast (SINGULAIR) 10 mg tablet, Take 10 mg by mouth daily  , Disp: , Rfl:     Multiple Vitamin (MULTIVITAMIN) tablet, Take 1 tablet by mouth daily  , Disp: , Rfl:     pantoprazole (PROTONIX) 20 mg tablet, Take 1 tablet (20 mg total) by mouth daily, Disp: 90 tablet, Rfl: 1    PROAIR  (90 Base) MCG/ACT inhaler, Inhale 2 puffs every 4 - 6 hours as needed, Disp: , Rfl: 0    rOPINIRole (REQUIP) 4 mg tablet, Take 0 5 tablets (2 mg total) by mouth daily at bedtime, Disp: 90 tablet, Rfl: 1    sertraline (ZOLOFT) 25 mg tablet, TAKE 1 TABLET (25 MG TOTAL) BY MOUTH DAILY, Disp: 30 tablet, Rfl: 2    benzonatate (TESSALON) 200 MG capsule, Take 1 capsule (200 mg total) by mouth 3 (three) times a day as needed for cough, Disp: 20 capsule, Rfl: 0    betamethasone, augmented, (DIPROLENE) 0 05 % ointment, Apply topically daily, Disp: , Rfl:     fluticasone-vilanterol (BREO ELLIPTA) 100-25 mcg/inh inhaler, Inhale 1 puff daily Rinse mouth after use , Disp: 1 Inhaler, Rfl: 0    levofloxacin (LEVAQUIN) 500 mg tablet, Take 1 tablet (500 mg total) by mouth every 24 hours for 10 doses, Disp: 10 tablet, Rfl: 0   Methylprednisolone 4 MG TBPK, Use as directed on package, Disp: 21 tablet, Rfl: 0    Misc Natural Products (ESTROVEN ENERGY) TABS, Take 2 tablets by mouth daily  , Disp: , Rfl:     Current Allergies     Allergies as of 07/13/2018 - Reviewed 07/13/2018   Allergen Reaction Noted    Cephalexin  10/27/2013    Other  10/27/2013            The following portions of the patient's history were reviewed and updated as appropriate: allergies, current medications, past family history, past medical history, past social history, past surgical history and problem list      Past Medical History:   Diagnosis Date    Diabetes mellitus ([address] )     Gallbladder disease     Hypertension     Insomnia        Past Surgical History:   Procedure Laterality Date    CHOLECYSTECTOMY      NASAL SEPTUM SURGERY      nasal septal deviation repair    [place] ESOPHAGOGASTRODUODENOSCOPY TRANSORAL DIAGNOSTIC N/A 2/25/2016    Procedure: EGD AND COLONOSCOPY;  Surgeon: Maira Roche MD;  Location: [facility]; Service: Gastroenterology       Family History   Problem Relation Age of Onset    Heart attack Father         acute MI    Emphysema Sister     Colonic polyp Sister     Cancer Sister          Medications have been verified  Objective   /73 (BP Location: Left arm)   Pulse 95   Temp 98 °F (36 7 °C) (Temporal)   Resp 20   Ht 5' 5" (1 651 m)   Wt 92 1 kg (203 lb)   SpO2 96%   BMI 33 78 kg/m²        Physical Exam     Physical Exam   Constitutional: She is oriented to person, place, and time  She appears well-developed and well-nourished  HENT:   Right Ear: External ear normal    Left Ear: External ear normal    Mouth/Throat: Oropharynx is clear and moist    Nasal congestion   Neck: Normal range of motion  Neck supple  Cardiovascular: Normal rate, regular rhythm and normal heart sounds  Pulmonary/Chest: Effort normal  No respiratory distress  She has no wheezes     Coarse breath sounds   Neurological: She is alert and oriented to person, place, and time  No nuchal rigidity   Skin: Skin is warm  Fair color and turgor   Psychiatric: She has a normal mood and affect  Her behavior is normal    Nursing note and vitals reviewed

## 2018-08-09 DIAGNOSIS — J45.41 MODERATE PERSISTENT ASTHMATIC BRONCHITIS WITH ACUTE EXACERBATION: ICD-10-CM

## 2018-08-09 RX ORDER — FLUTICASONE FUROATE AND VILANTEROL TRIFENATATE 100; 25 UG/1; UG/1
1 POWDER RESPIRATORY (INHALATION) DAILY
Qty: 1 INHALER | Refills: 0 | Status: SHIPPED | OUTPATIENT
Start: 2018-08-09 | End: 2018-09-09 | Stop reason: SDUPTHER

## 2018-08-15 DIAGNOSIS — E11.8 TYPE 2 DIABETES MELLITUS WITH COMPLICATION, WITHOUT LONG-TERM CURRENT USE OF INSULIN (HCC): ICD-10-CM

## 2018-08-21 DIAGNOSIS — F41.9 ANXIETY: ICD-10-CM

## 2018-08-21 RX ORDER — CLONAZEPAM 0.5 MG/1
0.5 TABLET ORAL 2 TIMES DAILY PRN
Qty: 60 TABLET | Refills: 1 | Status: SHIPPED | OUTPATIENT
Start: 2018-08-21 | End: 2019-01-10 | Stop reason: SDUPTHER

## 2018-09-09 DIAGNOSIS — J45.41 MODERATE PERSISTENT ASTHMATIC BRONCHITIS WITH ACUTE EXACERBATION: ICD-10-CM

## 2018-09-10 RX ORDER — FLUTICASONE FUROATE AND VILANTEROL TRIFENATATE 100; 25 UG/1; UG/1
1 POWDER RESPIRATORY (INHALATION) DAILY
Qty: 1 INHALER | Refills: 0 | Status: SHIPPED | OUTPATIENT
Start: 2018-09-10 | End: 2019-01-10 | Stop reason: SDUPTHER

## 2018-09-11 DIAGNOSIS — F41.9 ANXIETY: ICD-10-CM

## 2018-09-11 RX ORDER — SERTRALINE HYDROCHLORIDE 25 MG/1
25 TABLET, FILM COATED ORAL DAILY
Qty: 30 TABLET | Refills: 5 | Status: SHIPPED | OUTPATIENT
Start: 2018-09-11 | End: 2019-01-10 | Stop reason: SDUPTHER

## 2018-09-11 NOTE — TELEPHONE ENCOUNTER
sertraline (ZOLOFT) 25 mg tablet    Pt states she got a message from pharmacy that she did not understand  If you n=know anything about it please call her on her cell      [facility]

## 2018-09-30 DIAGNOSIS — J45.41 MODERATE PERSISTENT ASTHMATIC BRONCHITIS WITH ACUTE EXACERBATION: ICD-10-CM

## 2018-10-01 RX ORDER — FLUTICASONE FUROATE AND VILANTEROL TRIFENATATE 100; 25 UG/1; UG/1
1 POWDER RESPIRATORY (INHALATION) DAILY
Qty: 1 INHALER | Refills: 1 | Status: SHIPPED | OUTPATIENT
Start: 2018-10-01 | End: 2020-09-02 | Stop reason: SDUPTHER

## 2018-11-14 DIAGNOSIS — R05.3 CHRONIC COUGH: Primary | ICD-10-CM

## 2018-11-14 RX ORDER — MONTELUKAST SODIUM 10 MG/1
10 TABLET ORAL DAILY
Qty: 90 TABLET | Refills: 1 | Status: SHIPPED | OUTPATIENT
Start: 2018-11-14 | End: 2019-01-10 | Stop reason: SDUPTHER

## 2018-12-07 DIAGNOSIS — E78.5 HYPERLIPIDEMIA, UNSPECIFIED HYPERLIPIDEMIA TYPE: ICD-10-CM

## 2018-12-07 DIAGNOSIS — I15.2 HYPERTENSION ASSOCIATED WITH DIABETES (HCC): ICD-10-CM

## 2018-12-07 DIAGNOSIS — E11.59 HYPERTENSION ASSOCIATED WITH DIABETES (HCC): ICD-10-CM

## 2018-12-07 DIAGNOSIS — K21.9 GASTROESOPHAGEAL REFLUX DISEASE WITHOUT ESOPHAGITIS: ICD-10-CM

## 2018-12-10 RX ORDER — ATORVASTATIN CALCIUM 20 MG/1
TABLET, FILM COATED ORAL
Qty: 90 TABLET | Refills: 1 | Status: SHIPPED | OUTPATIENT
Start: 2018-12-10 | End: 2019-01-10 | Stop reason: SDUPTHER

## 2018-12-10 RX ORDER — IRBESARTAN 150 MG/1
TABLET ORAL
Qty: 90 TABLET | Refills: 1 | Status: SHIPPED | OUTPATIENT
Start: 2018-12-10 | End: 2019-01-10 | Stop reason: SDUPTHER

## 2018-12-10 RX ORDER — PANTOPRAZOLE SODIUM 20 MG/1
TABLET, DELAYED RELEASE ORAL
Qty: 90 TABLET | Refills: 1 | Status: SHIPPED | OUTPATIENT
Start: 2018-12-10 | End: 2019-01-10 | Stop reason: SDUPTHER

## 2018-12-17 LAB
LEFT EYE DIABETIC RETINOPATHY: NORMAL
RIGHT EYE DIABETIC RETINOPATHY: NORMAL

## 2019-01-07 LAB
BUN SERPL-MCNC: 14 MG/DL (ref 7–25)
BUN/CREAT SERPL: ABNORMAL (CALC) (ref 6–22)
CALCIUM SERPL-MCNC: 9.7 MG/DL (ref 8.6–10.4)
CHLORIDE SERPL-SCNC: 105 MMOL/L (ref 98–110)
CHOLEST SERPL-MCNC: 149 MG/DL
CHOLEST/HDLC SERPL: 3.7 (CALC)
CO2 SERPL-SCNC: 25 MMOL/L (ref 20–32)
CREAT SERPL-MCNC: 0.76 MG/DL (ref 0.5–0.99)
EST. AVERAGE GLUCOSE BLD GHB EST-MCNC: 177 (CALC)
EST. AVERAGE GLUCOSE BLD GHB EST-SCNC: 9.8 (CALC)
GLUCOSE SERPL-MCNC: 129 MG/DL (ref 65–99)
HBA1C MFR BLD: 7.8 % OF TOTAL HGB
HDLC SERPL-MCNC: 40 MG/DL
LDLC SERPL CALC-MCNC: 82 MG/DL (CALC)
NONHDLC SERPL-MCNC: 109 MG/DL (CALC)
POTASSIUM SERPL-SCNC: 4.2 MMOL/L (ref 3.5–5.3)
SL AMB EGFR AFRICAN AMERICAN: 98 ML/MIN/1.73M2
SL AMB EGFR NON AFRICAN AMERICAN: 85 ML/MIN/1.73M2
SODIUM SERPL-SCNC: 140 MMOL/L (ref 135–146)
TRIGL SERPL-MCNC: 175 MG/DL

## 2019-01-07 NOTE — PROGRESS NOTES
Assessment/Plan:  1  Type 2 diabetes mellitus with complication, without long-term current use of insulin (HCC)  aic increasing 7 8   Increased metformin  Add Co Q10 - she knows to  over-the-counter  - metFORMIN (GLUCOPHAGE) 1000 MG tablet; Take 1 tablet (1,000 mg total) by mouth 2 (two) times a day with meals  Dispense: 180 tablet; Refill: 1    2  Hypertension associated with diabetes ([address] )  Stable  - irbesartan (AVAPRO) 150 mg tablet; Take 1 tablet (150 mg total) by mouth daily  Dispense: 90 tablet; Refill: 0    3  Gastroesophageal reflux disease without esophagitis  Stable  - pantoprazole (PROTONIX) 20 mg tablet; Take 1 tablet (20 mg total) by mouth daily  Dispense: 90 tablet; Refill: 0    4  Allergic rhinitis, unspecified seasonality, unspecified trigger  Intermittent  breakthrough symptoms at night  She is on Singulair  Stop Benadryl  Add as needed Claritin  - loratadine (CLARITIN) 10 mg tablet; Take 1 tablet (10 mg total) by mouth daily as needed for allergies  Dispense: 90 tablet; Refill: 0    5  Hyperlipidemia, unspecified hyperlipidemia type  Stable  - atorvastatin (LIPITOR) 20 mg tablet; Take 1 tablet (20 mg total) by mouth daily  Dispense: 90 tablet; Refill: 0    6  Anxiety  Not under control - having sleep issues; hot flashes  Increase Zoloft from 25-50 mg nightly  - sertraline (ZOLOFT) 50 mg tablet; Take 1 tablet (50 mg total) by mouth daily  Dispense: 90 tablet; Refill: 0  - clonazePAM (KlonoPIN) 0 5 mg tablet; Take 1 tablet (0 5 mg total) by mouth 2 (two) times a day as needed for anxiety  Dispense: 60 tablet; Refill: 0    7  Chronic cough  Improved  - montelukast (SINGULAIR) 10 mg tablet; Take 1 tablet (10 mg total) by mouth daily  Dispense: 90 tablet; Refill: 0    8  Restless legs syndrome  Add gabapentin  Increased Zoloft  Wean Requip - Requip likely causing increase hot flashes  - sertraline (ZOLOFT) 50 mg tablet;  Take 1 tablet (50 mg total) by mouth daily  Dispense: 90 tablet; Refill: 0  - rOPINIRole (REQUIP) 4 mg tablet; Take half tab every other day for 1 week then 1/2 tab every 2 days following week then off  Dispense: 90 tablet; Refill: 0  - gabapentin (NEURONTIN) 100 mg capsule; Take 1 capsule (100 mg total) by mouth daily at bedtime  Dispense: 90 capsule; Refill: 0    9  Hot flashes due to menopause  Hot flashes likely due to uncontrolled anxiety and Requip    I have spent 40 minutes with Patient  today in which greater than 50% of this time was spent in counseling/coordination of care regarding Diagnostic results, Prognosis, Risks and benefits of tx options, Intructions for management, Patient and family education, Importance of tx compliance, Risk factor reductions and Impressions  See her back in 4 weeks    Subjective: Patient is here a 6 month follow up   Labs done 1/5/19  HM: Hep C screening, Pneumo, Flu vac, Eye exam     Patient ID: Elena Bee is a 66 y o  female  HPI  Chronic disease follow-up with labs  Lab review done - lipid profile:  149, HDL 40, triglycerides 175, LDL 82; BMP-blood sugar 129 BUN 14, creatinine 0 76 electrolytes normal; A1c 7 8 - up from 6 7  Metformin 500 b i d   BP stable  Weight 6 lb weight gain  Past Medical has history and problem list reviewed  Med list reviewed  Patient complaining of significant hot flashes - on 2 mg Requip per night - patient reports decreased Requip from 4 mg to 2mg on her own and hot flashes decreased  Also taking Benadryl for sleep - complaints dry mouth  History of anxiety on Zoloft 25 before bed - Klonopin during the day 1-2 tabs  Patient has been told that hot flashes were secondary to menopause  History of MRSA on skin uses Hibiclens nightly  Eye exam done - no retinopathy      The following portions of the patient's history were reviewed and updated as appropriate: allergies, past social history, past surgical history and problem list     Review of Systems   Constitutional: Negative for activity change and appetite change  HENT: Negative  Dry mouth   Eyes: Negative  Respiratory: Negative  Cardiovascular: Negative  Negative for chest pain  Gastrointestinal: Negative  Endocrine: Positive for polydipsia  Negative for cold intolerance  Hot flashes   Genitourinary: Negative  Musculoskeletal: Negative  Skin: Negative  Dry skin   Allergic/Immunologic: Negative  Neurological: Negative  Hematological: Negative  Psychiatric/Behavioral: Positive for sleep disturbance  The patient is nervous/anxious  All other systems reviewed and are negative  Objective:      /84   Pulse 95   Temp 97 9 °F (36 6 °C)   Ht 5' 4 5" (1 638 m)   Wt 94 8 kg (209 lb)   SpO2 98%   BMI 35 32 kg/m²          Physical Exam   Constitutional: She is oriented to person, place, and time  She appears well-developed and well-nourished  No distress  HENT:   Head: Normocephalic  Right Ear: Tympanic membrane and external ear normal  No decreased hearing is noted  Left Ear: Tympanic membrane and external ear normal  No decreased hearing is noted  Mouth/Throat: Oropharynx is clear and moist    Eyes: Pupils are equal, round, and reactive to light  Conjunctivae are normal    Neck: Normal range of motion  Neck supple  No thyromegaly present  Cardiovascular: Normal rate, regular rhythm, normal heart sounds and intact distal pulses  No murmur heard  Pulmonary/Chest: Effort normal and breath sounds normal  No respiratory distress  Abdominal: Soft  Bowel sounds are normal    Musculoskeletal: Normal range of motion  Lymphadenopathy:     She has no cervical adenopathy  Neurological: She is alert and oriented to person, place, and time  She has normal reflexes  No cranial nerve deficit  Coordination normal    Skin: Skin is warm and dry  Skin is extremely dry   Psychiatric: She has a normal mood and affect   Her behavior is normal  Thought content normal  Her speech is rapid and/or pressured and tangential

## 2019-01-10 ENCOUNTER — OFFICE VISIT (OUTPATIENT)
Dept: FAMILY MEDICINE CLINIC | Facility: CLINIC | Age: 64
End: 2019-01-10
Payer: COMMERCIAL

## 2019-01-10 VITALS
OXYGEN SATURATION: 98 % | SYSTOLIC BLOOD PRESSURE: 150 MMHG | HEIGHT: 65 IN | TEMPERATURE: 97.9 F | WEIGHT: 209 LBS | DIASTOLIC BLOOD PRESSURE: 84 MMHG | HEART RATE: 95 BPM | BODY MASS INDEX: 34.82 KG/M2

## 2019-01-10 DIAGNOSIS — E11.59 HYPERTENSION ASSOCIATED WITH DIABETES (HCC): ICD-10-CM

## 2019-01-10 DIAGNOSIS — F41.9 ANXIETY: ICD-10-CM

## 2019-01-10 DIAGNOSIS — N95.1 HOT FLASHES DUE TO MENOPAUSE: ICD-10-CM

## 2019-01-10 DIAGNOSIS — R05.3 CHRONIC COUGH: ICD-10-CM

## 2019-01-10 DIAGNOSIS — K21.9 GASTROESOPHAGEAL REFLUX DISEASE WITHOUT ESOPHAGITIS: ICD-10-CM

## 2019-01-10 DIAGNOSIS — E78.5 HYPERLIPIDEMIA, UNSPECIFIED HYPERLIPIDEMIA TYPE: ICD-10-CM

## 2019-01-10 DIAGNOSIS — G25.81 RESTLESS LEGS SYNDROME: ICD-10-CM

## 2019-01-10 DIAGNOSIS — I15.2 HYPERTENSION ASSOCIATED WITH DIABETES (HCC): ICD-10-CM

## 2019-01-10 DIAGNOSIS — J30.9 ALLERGIC RHINITIS, UNSPECIFIED SEASONALITY, UNSPECIFIED TRIGGER: ICD-10-CM

## 2019-01-10 DIAGNOSIS — E11.8 TYPE 2 DIABETES MELLITUS WITH COMPLICATION, WITHOUT LONG-TERM CURRENT USE OF INSULIN (HCC): Primary | ICD-10-CM

## 2019-01-10 PROCEDURE — 4010F ACE/ARB THERAPY RXD/TAKEN: CPT | Performed by: NURSE PRACTITIONER

## 2019-01-10 PROCEDURE — 99215 OFFICE O/P EST HI 40 MIN: CPT | Performed by: NURSE PRACTITIONER

## 2019-01-10 RX ORDER — MONTELUKAST SODIUM 10 MG/1
10 TABLET ORAL DAILY
Qty: 90 TABLET | Refills: 0 | Status: SHIPPED | OUTPATIENT
Start: 2019-01-10 | End: 2019-08-21 | Stop reason: SDUPTHER

## 2019-01-10 RX ORDER — CLONAZEPAM 0.5 MG/1
0.5 TABLET ORAL 2 TIMES DAILY PRN
Qty: 60 TABLET | Refills: 0 | Status: SHIPPED | OUTPATIENT
Start: 2019-01-10 | End: 2019-03-26 | Stop reason: SDUPTHER

## 2019-01-10 RX ORDER — IRBESARTAN 150 MG/1
150 TABLET ORAL DAILY
Qty: 90 TABLET | Refills: 0 | Status: SHIPPED | OUTPATIENT
Start: 2019-01-10 | End: 2019-11-12 | Stop reason: SDUPTHER

## 2019-01-10 RX ORDER — ATORVASTATIN CALCIUM 20 MG/1
20 TABLET, FILM COATED ORAL DAILY
Qty: 90 TABLET | Refills: 0 | Status: SHIPPED | OUTPATIENT
Start: 2019-01-10 | End: 2019-08-21 | Stop reason: SDUPTHER

## 2019-01-10 RX ORDER — LORATADINE 10 MG/1
10 TABLET ORAL DAILY PRN
Qty: 90 TABLET | Refills: 0 | Status: SHIPPED | OUTPATIENT
Start: 2019-01-10 | End: 2019-04-08 | Stop reason: SDUPTHER

## 2019-01-10 RX ORDER — GABAPENTIN 100 MG/1
100 CAPSULE ORAL
Qty: 90 CAPSULE | Refills: 0 | Status: SHIPPED | OUTPATIENT
Start: 2019-01-10 | End: 2019-03-26 | Stop reason: SDUPTHER

## 2019-01-10 RX ORDER — PANTOPRAZOLE SODIUM 20 MG/1
20 TABLET, DELAYED RELEASE ORAL DAILY
Qty: 90 TABLET | Refills: 0 | Status: SHIPPED | OUTPATIENT
Start: 2019-01-10 | End: 2019-09-09 | Stop reason: SDUPTHER

## 2019-01-10 RX ORDER — ROPINIROLE 4 MG/1
TABLET, FILM COATED ORAL
Qty: 90 TABLET | Refills: 0
Start: 2019-01-10 | End: 2019-02-11 | Stop reason: ALTCHOICE

## 2019-01-10 NOTE — PATIENT INSTRUCTIONS
Weaned off Requip take half a tab every other day for 1 week the following week take half a tab every 2 days then discontinue  Start gabapentin at night for restless legs  Increased Zoloft to 2 tabs before bed -new script sent to pharmacy at 50 mg tabs before bed  Zoloft may help your hot flashes as well  Increased metformin from 500 mg tabs to a 1000 mg finish current script by taking 2 tabs 2 times a day new scripts sent to pharmacy with new dose    Stop Benadryl take as needed loratadine  Co q 10 50 mg daily OTC  Stop Hibiclens

## 2019-02-11 ENCOUNTER — OFFICE VISIT (OUTPATIENT)
Dept: FAMILY MEDICINE CLINIC | Facility: CLINIC | Age: 64
End: 2019-02-11
Payer: COMMERCIAL

## 2019-02-11 VITALS
TEMPERATURE: 98.4 F | HEIGHT: 64 IN | OXYGEN SATURATION: 97 % | DIASTOLIC BLOOD PRESSURE: 80 MMHG | HEART RATE: 80 BPM | SYSTOLIC BLOOD PRESSURE: 140 MMHG | RESPIRATION RATE: 20 BRPM | WEIGHT: 205 LBS | BODY MASS INDEX: 35 KG/M2

## 2019-02-11 DIAGNOSIS — J30.9 ALLERGIC RHINITIS, UNSPECIFIED SEASONALITY, UNSPECIFIED TRIGGER: ICD-10-CM

## 2019-02-11 DIAGNOSIS — E11.8 TYPE 2 DIABETES MELLITUS WITH COMPLICATION, WITHOUT LONG-TERM CURRENT USE OF INSULIN (HCC): ICD-10-CM

## 2019-02-11 DIAGNOSIS — F41.9 ANXIETY: Primary | ICD-10-CM

## 2019-02-11 PROCEDURE — 3008F BODY MASS INDEX DOCD: CPT | Performed by: NURSE PRACTITIONER

## 2019-02-11 PROCEDURE — 1036F TOBACCO NON-USER: CPT | Performed by: NURSE PRACTITIONER

## 2019-02-11 PROCEDURE — 99213 OFFICE O/P EST LOW 20 MIN: CPT | Performed by: NURSE PRACTITIONER

## 2019-02-11 NOTE — PATIENT INSTRUCTIONS
Allergic Rhinitis   WHAT YOU NEED TO KNOW:   Allergic rhinitis, or hay fever, is swelling of the inside of your nose  The swelling is a reaction to allergens in the air  An allergen can be anything that causes an allergic reaction  Allergies to weeds, grass, trees, or mold often cause seasonal allergic rhinitis  Indoor dust mites, cockroaches, pet dander, or mold can also cause allergic rhinitis  DISCHARGE INSTRUCTIONS:   Call 911 for the following:   · You have chest pain or shortness of breath  Return to the emergency department if:   · You have severe pain  · You cough up blood  Contact your healthcare provider if:   · You have a fever  · You have ear or sinus pain, or a headache  · Your symptoms get worse, even after treatment  · You have yellow, green, brown, or bloody mucus coming from your nose  · Your nose is bleeding or you have pain inside your nose  · You have trouble sleeping because of your symptoms  · You have questions or concerns about your condition or care  Medicines:   · Medicines  help decrease your symptoms and clear your stuffy nose  · Take your medicine as directed  Contact your healthcare provider if you think your medicine is not helping or if you have side effects  Tell him of her if you are allergic to any medicine  Keep a list of the medicines, vitamins, and herbs you take  Include the amounts, and when and why you take them  Bring the list or the pill bottles to follow-up visits  Carry your medicine list with you in case of an emergency  How to manage allergic rhinitis:  The best way to manage allergic rhinitis is to avoid allergens that can trigger your symptoms  Any of the following may help decrease your symptoms:  · Rinse your nose and sinuses  with a salt water solution or use a salt water nasal spray  This will help thin the mucus in your nose and rinse away pollen and dirt  It will also help reduce swelling so you can breathe normally  Ask your healthcare provider how often to rinse your nose  · Reduce exposure to dust mites  Wash sheets and towels in hot water every week  Cover your pillows and mattresses with allergen-free covers  Limit the number of stuffed animals and soft toys your child has  Wash your child's toys in hot water regularly  Vacuum weekly and use a vacuum  with an air filter  If possible, get rid of carpets and curtains  These collect dust and dust mites  · Reduce exposure to pollen  Keep windows and doors closed in your house and car  Stay inside when air pollution or the pollen count is high  Run your air conditioner on recycle, and change air filters often  Shower and wash your hair before bed every night to rinse away pollen  · Reduce exposure to pet dander  If possible, do not keep cats, dogs, birds, or other pets  If you do keep pets in your home, keep them out of bedrooms and carpeted rooms  Bathe them often  · Reduce exposure to mold  Do not spend time in basements  Choose artificial plants instead of live plants  Keep your home's humidity at less than 45%  Do not have ponds or standing water in your home or yard  · Do not smoke  Avoid others who smoke  Ask your healthcare provider for information if you currently smoke and need help to quit  Follow up with your healthcare provider as directed: You may need to see an allergist often to control your symptoms  Write down your questions so you remember to ask them during your visits  © 2017 [address] Information is for End User's use only and may not be sold, redistributed or otherwise used for commercial purposes  All illustrations and images included in CareNotes® are the copyrighted property of [employer] , [employer]  or [employer]  The above information is an  only  It is not intended as medical advice for individual conditions or treatments   Talk to your doctor, nurse or pharmacist before following any medical regimen to see if it is safe and effective for you

## 2019-02-11 NOTE — PROGRESS NOTES
Assessment/Plan:  Anxiety has gotten better with increased dose of Zoloft  Allergic rhinitis an issue however not amendable to see an allergist - asked patient to be more cognizant of triggers - and try avoidance  See AVS for instructions  No problem-specific Assessment & Plan notes found for this encounter  Diagnoses and all orders for this visit:    Anxiety    Type 2 diabetes mellitus with complication, without long-term current use of insulin ([facility])  -     Comprehensive metabolic panel; Future  -     Lipid panel; Future  -     Hemoglobin A1C; Future    Allergic rhinitis, unspecified seasonality, unspecified trigger          Subjective: Patient  Is here for a 1 month follow up- increased Zoloft   Patient says she's doing well on the mediation increase  Patient ID: Roberta De Leon is a 66 y o  female  HPI  Patient here for 1 month follow-up depression and anxiety  Last visit we increased her Zoloft from 25-50 mg a day  She reports less hot flashes and decreased anxiety  Allergies still seem to be an issue - still with nasal stuffiness/nighttime cough -  she has changed from Allegra to Claritin - she is taking Flonase - states exposed to cat last couple days thinks this is an issue -  declining a referral to the allergist     The following portions of the patient's history were reviewed and updated as appropriate: allergies, past social history, past surgical history and problem list     Review of Systems   Constitutional: Negative for activity change and appetite change  HENT: Positive for congestion  Dry mouth   Eyes: Negative  Respiratory: Positive for cough  Cardiovascular: Negative  Negative for chest pain  Genitourinary: Negative  Allergic/Immunologic: Positive for environmental allergies  Psychiatric/Behavioral: Negative for sleep disturbance  The patient is not nervous/anxious  All other systems reviewed and are negative          Objective:      /80 (BP Location: Left arm, Patient Position: Sitting, Cuff Size: Adult)   Pulse 80   Temp 98 4 °F (36 9 °C) (Oral)   Resp 20   Ht 5' 4" (1 626 m)   Wt 93 kg (205 lb)   SpO2 97%   BMI 35 19 kg/m²          Physical Exam   Constitutional: She is oriented to person, place, and time  She appears well-developed and well-nourished  No distress  HENT:   Mouth/Throat: Oropharynx is clear and moist  No oropharyngeal exudate  Nasal passages boggy   Eyes: Pupils are equal, round, and reactive to light  Conjunctivae are normal    Cardiovascular: Normal rate, regular rhythm and normal heart sounds  Pulmonary/Chest: Effort normal and breath sounds normal  No respiratory distress  Neurological: She is alert and oriented to person, place, and time  Skin: Skin is warm and dry  Psychiatric: She has a normal mood and affect   Her behavior is normal

## 2019-03-18 DIAGNOSIS — F41.9 ANXIETY: ICD-10-CM

## 2019-03-18 RX ORDER — SERTRALINE HYDROCHLORIDE 25 MG/1
TABLET, FILM COATED ORAL
Qty: 90 TABLET | Refills: 1 | Status: SHIPPED | OUTPATIENT
Start: 2019-03-18 | End: 2019-08-21

## 2019-03-26 DIAGNOSIS — G25.81 RESTLESS LEGS SYNDROME: ICD-10-CM

## 2019-03-26 DIAGNOSIS — F41.9 ANXIETY: ICD-10-CM

## 2019-03-26 NOTE — TELEPHONE ENCOUNTER
clonazePAM (KlonoPIN) 0 5 mg tablet  gabapentin (NEURONTIN) 100 mg capsule  TO [facility] ON [facility]

## 2019-03-27 RX ORDER — GABAPENTIN 100 MG/1
100 CAPSULE ORAL
Qty: 90 CAPSULE | Refills: 0 | Status: SHIPPED | OUTPATIENT
Start: 2019-03-27 | End: 2019-05-21 | Stop reason: SDUPTHER

## 2019-03-27 RX ORDER — CLONAZEPAM 0.5 MG/1
0.5 TABLET ORAL DAILY PRN
Qty: 30 TABLET | Refills: 0 | Status: SHIPPED | OUTPATIENT
Start: 2019-03-27 | End: 2019-05-21 | Stop reason: SDUPTHER

## 2019-04-08 DIAGNOSIS — J30.9 ALLERGIC RHINITIS, UNSPECIFIED SEASONALITY, UNSPECIFIED TRIGGER: ICD-10-CM

## 2019-04-08 RX ORDER — LORATADINE 10 MG/1
10 TABLET ORAL DAILY PRN
Qty: 90 TABLET | Refills: 0 | Status: SHIPPED | OUTPATIENT
Start: 2019-04-08 | End: 2019-08-21

## 2019-05-21 DIAGNOSIS — F41.9 ANXIETY: ICD-10-CM

## 2019-05-21 DIAGNOSIS — G25.81 RESTLESS LEGS SYNDROME: ICD-10-CM

## 2019-05-21 RX ORDER — CLONAZEPAM 0.5 MG/1
0.5 TABLET ORAL DAILY PRN
Qty: 30 TABLET | Refills: 0 | Status: SHIPPED | OUTPATIENT
Start: 2019-05-21 | End: 2019-07-01 | Stop reason: SDUPTHER

## 2019-05-21 RX ORDER — GABAPENTIN 100 MG/1
100 CAPSULE ORAL
Qty: 90 CAPSULE | Refills: 0 | Status: SHIPPED | OUTPATIENT
Start: 2019-05-21 | End: 2019-08-21 | Stop reason: SDUPTHER

## 2019-06-24 DIAGNOSIS — G25.81 RESTLESS LEGS SYNDROME: ICD-10-CM

## 2019-06-24 DIAGNOSIS — F41.9 ANXIETY: ICD-10-CM

## 2019-07-01 DIAGNOSIS — F41.9 ANXIETY: ICD-10-CM

## 2019-07-03 RX ORDER — CLONAZEPAM 0.5 MG/1
0.5 TABLET ORAL DAILY PRN
Qty: 30 TABLET | Refills: 0 | Status: SHIPPED | OUTPATIENT
Start: 2019-07-03 | End: 2019-08-14 | Stop reason: SDUPTHER

## 2019-08-14 DIAGNOSIS — F41.9 ANXIETY: ICD-10-CM

## 2019-08-15 RX ORDER — CLONAZEPAM 0.5 MG/1
0.5 TABLET ORAL DAILY PRN
Qty: 7 TABLET | Refills: 0 | Status: SHIPPED | OUTPATIENT
Start: 2019-08-15 | End: 2019-08-21 | Stop reason: SDUPTHER

## 2019-08-16 NOTE — TELEPHONE ENCOUNTER
LM on home phone told pt to schedule appt because Al[facility] Anchors will allow one wk of meds till patient is seen

## 2019-08-19 LAB
ALBUMIN SERPL-MCNC: 4.1 G/DL (ref 3.6–5.1)
ALBUMIN/GLOB SERPL: 2.2 (CALC) (ref 1–2.5)
ALP SERPL-CCNC: 90 U/L (ref 33–130)
ALT SERPL-CCNC: 28 U/L (ref 6–29)
AST SERPL-CCNC: 23 U/L (ref 10–35)
BILIRUB SERPL-MCNC: 0.4 MG/DL (ref 0.2–1.2)
BUN SERPL-MCNC: 14 MG/DL (ref 7–25)
BUN/CREAT SERPL: ABNORMAL (CALC) (ref 6–22)
CALCIUM SERPL-MCNC: 9.3 MG/DL (ref 8.6–10.4)
CHLORIDE SERPL-SCNC: 108 MMOL/L (ref 98–110)
CHOLEST SERPL-MCNC: 123 MG/DL
CHOLEST/HDLC SERPL: 2.8 (CALC)
CO2 SERPL-SCNC: 22 MMOL/L (ref 20–32)
CREAT SERPL-MCNC: 0.98 MG/DL (ref 0.5–0.99)
GLOBULIN SER CALC-MCNC: 1.9 G/DL (CALC) (ref 1.9–3.7)
GLUCOSE SERPL-MCNC: 85 MG/DL (ref 65–99)
HBA1C MFR BLD: 6.4 % OF TOTAL HGB
HDLC SERPL-MCNC: 44 MG/DL
LDLC SERPL CALC-MCNC: 57 MG/DL (CALC)
NONHDLC SERPL-MCNC: 79 MG/DL (CALC)
POTASSIUM SERPL-SCNC: 4.4 MMOL/L (ref 3.5–5.3)
PROT SERPL-MCNC: 6 G/DL (ref 6.1–8.1)
SL AMB EGFR AFRICAN AMERICAN: 72 ML/MIN/1.73M2
SL AMB EGFR NON AFRICAN AMERICAN: 62 ML/MIN/1.73M2
SODIUM SERPL-SCNC: 140 MMOL/L (ref 135–146)
TRIGL SERPL-MCNC: 135 MG/DL

## 2019-08-20 NOTE — PROGRESS NOTES
Assessment/Plan:  I have spent 40 minutes with Patient  today in which greater than 50% of this time was spent in counseling/coordination of care regarding Diagnostic results, Prognosis, Risks and benefits of tx options, Intructions for management, Patient and family education, Importance of tx compliance, Risk factor reductions and Impressions  Gastroesophageal reflux disease  Pt still using protonix - no break through sxs  She is amendable to wean off - start w/ before bed zantac and take as needed during the day  After 1-2 weeks use only if needed  Diabetes mellitus ([facility] )  Lab Results   Component Value Date    HGBA1C 6 4 (H) 08/17/2019       No results for input(s): POCGLU in the last 72 hours  Improved w/13 lb weight loss in 6 months  Continue current metformin 1000 mg BID  BP stable  Urine alb sample given  Foot done  Blood Sugar Average: Last 72 hrs:      Hypertension associated with diabetes ([facility] )  Lab Results   Component Value Date    HGBA1C 6 4 (H) 08/17/2019       No results for input(s): POCGLU in the last 72 hours  Blood Sugar Average: Last 72 hrs:  stable on arb -avapro  150 mg daily    Allergic rhinitis  sxs not controlled on Singulair daily - not using daily Flonase/antihistamine  Try zyrtec - avoid combining w/ benadryl  Use Flonase daily  Mild intermittent asthma with acute exacerbation  Pt not using BREO daily states breakthrough wheezing 1-2 times a week - then using BREO for a rescue - discussed proper use of maintenance inhaler and rescue inhaler  Asked pt to restart BREO daily, use proair as needed for breakthrough wheezing  LUNG sound dimiinshed - no wh/sat stable  Anxiety  Controlled on nightly sertraline and daily klonopin  PAPDMP check and approp  Hot flashes due to menopause  Resolved - stopped drinking beer    Hyperlipidemia  Improved - cont weight loss strategies ( quit beer)  Statin on for DM tx  Restless legs syndrome  Improved w/ gabapentin    Unable to tolerate requip  Diagnoses and all orders for this visit:    Hypertension associated with diabetes ([facility] )  -     Comprehensive metabolic panel; Future  -     Hemoglobin A1C; Future  -     Lipid panel; Future  -     TSH, 3rd generation with Free T4 reflex; Future    Type 2 diabetes mellitus with complication, without long-term current use of insulin ([facility])  -     Microalbumin / creatinine urine ratio  -     metFORMIN (GLUCOPHAGE) 1000 MG tablet; Take 1 tablet (1,000 mg total) by mouth 2 (two) times a day with meals  -     Comprehensive metabolic panel; Future  -     Hemoglobin A1C; Future  -     Lipid panel; Future  -     TSH, 3rd generation with Free T4 reflex; Future    Anxiety  -     clonazePAM (KlonoPIN) 0 5 mg tablet; Take 1 tablet (0 5 mg total) by mouth daily as needed for anxiety  -     sertraline (ZOLOFT) 50 mg tablet; Take 1 tablet (50 mg total) by mouth daily  -     Comprehensive metabolic panel; Future  -     Hemoglobin A1C; Future  -     Lipid panel; Future  -     TSH, 3rd generation with Free T4 reflex; Future    Hyperlipidemia, unspecified hyperlipidemia type  -     atorvastatin (LIPITOR) 20 mg tablet; Take 1 tablet (20 mg total) by mouth daily  -     Comprehensive metabolic panel; Future  -     Hemoglobin A1C; Future  -     Lipid panel; Future  -     TSH, 3rd generation with Free T4 reflex; Future    Restless legs syndrome  -     sertraline (ZOLOFT) 50 mg tablet; Take 1 tablet (50 mg total) by mouth daily  -     gabapentin (NEURONTIN) 100 mg capsule; Take 1 capsule (100 mg total) by mouth daily at bedtime  -     Comprehensive metabolic panel; Future  -     Hemoglobin A1C; Future  -     Lipid panel; Future  -     TSH, 3rd generation with Free T4 reflex; Future    Need for pneumococcal vaccination  -     Pneumococcal polysaccharide vaccine 23-valent greater than or equal to 3yo subcutaneous/IM  -     Comprehensive metabolic panel; Future  -     Hemoglobin A1C; Future  -     Lipid panel;  Future  - TSH, 3rd generation with Free T4 reflex; Future    Allergic rhinitis, unspecified seasonality, unspecified trigger  -     cetirizine (ZyrTEC) 10 mg tablet; Take 1 tablet (10 mg total) by mouth daily  -     Comprehensive metabolic panel; Future  -     Hemoglobin A1C; Future  -     Lipid panel; Future  -     TSH, 3rd generation with Free T4 reflex; Future    Chronic cough  -     montelukast (SINGULAIR) 10 mg tablet; Take 1 tablet (10 mg total) by mouth daily  -     Comprehensive metabolic panel; Future  -     Hemoglobin A1C; Future  -     Lipid panel; Future  -     TSH, 3rd generation with Free T4 reflex; Future    Gastroesophageal reflux disease without esophagitis  -     ranitidine (ZANTAC) 150 mg tablet; Take 1 tablet (150 mg total) by mouth 2 (two) times a day as needed for heartburn or indigestion  -     Comprehensive metabolic panel; Future  -     Hemoglobin A1C; Future  -     Lipid panel; Future  -     TSH, 3rd generation with Free T4 reflex; Future    Mild intermittent asthma with acute exacerbation  -     PROAIR  (90 Base) MCG/ACT inhaler; Inhale 2 puffs every 6 (six) hours as needed for wheezing or shortness of breath every 4 - 6 hours as needed  -     Comprehensive metabolic panel; Future  -     Hemoglobin A1C; Future  -     Lipid panel; Future  -     TSH, 3rd generation with Free T4 reflex; Future    Hot flashes due to menopause          Subjective: Patient is here for a 6 month follow up   Labs done 8/17/19  Patient given the phq9     Health Maintenance Due   Topic Date Due    Hepatitis C Screening  1955    Pneumococcal Vaccine: Pediatrics (0 to 5 Years) and At-Risk Patients (6 to 59 Years) (1 of 1 - PPSV23) 12/16/1963    BMI: Followup Plan  12/16/1975    URINE MICROALBUMIN  01/27/2019    Diabetic Foot Exam  02/14/2019    Depression Screening PHQ  05/15/2019    PAP SMEAR  10/06/2019          Patient ID: Lore Bennett is a 66 y o  female      HPI  Chronic dz follow up w/ lab review - stable/improved  a1c 6 4  cmp nl/lipid improved w/weight loss strategies ( 13 lbs in 6 mos)  Med list verified updated  INC congestion/ PND / wh last couple mos  Using as needed breo  NO use of daily antihistamine  The following portions of the patient's history were reviewed and updated as appropriate: allergies, past social history, past surgical history and problem list     Review of Systems   Constitutional: Negative for activity change and appetite change  HENT: Positive for congestion  Eyes: Negative  Respiratory: Positive for cough and wheezing  See hpi   Cardiovascular: Negative  Negative for chest pain  Gastrointestinal: Negative  Endocrine: Negative  Negative for cold intolerance  Genitourinary: Negative  Musculoskeletal: Negative  Skin: Negative  Allergic/Immunologic: Negative  Neurological: Negative  Hematological: Negative  Psychiatric/Behavioral: Negative  All other systems reviewed and are negative  Objective:      /74 (BP Location: Left arm, Patient Position: Sitting, Cuff Size: Standard)   Pulse 78   Temp 98 2 °F (36 8 °C) (Oral)   Resp 17   Ht 5' 4" (1 626 m)   Wt 86 6 kg (191 lb)   SpO2 99%   BMI 32 79 kg/m²     PHQ-9 Depression Screening    PHQ-9:    Frequency of the following problems over the past two weeks:       Little interest or pleasure in doing things:  0 - not at all  Feeling down, depressed, or hopeless:  0 - not at all  PHQ-2 Score:  0       LEATHA-7 Flowsheet Screening      Most Recent Value   Over the last two weeks, how often have you been bothered by the following problems?     Feeling nervous, anxious, or on edge  1   Not being able to stop or control worrying  1   Worrying too much about different things  1   Trouble relaxing   0   Being so restless that it's hard to sit still  1   Becoming easily annoyed or irritable   0   Feeling afraid as if something awful might happen  0   How difficult have these problems made it for you to do your work, take care of things at home, or get along with other people? Not difficult at all   LEATHA Score   4          Patient's shoes and socks removed  Right Foot/Ankle   Right Foot Inspection  Skin Exam: skin normal and skin intact no dry skin, no warmth, no callus, no erythema, no maceration, no abnormal color, no pre-ulcer, no ulcer and no callus                          Toe Exam: ROM and strength within normal limits  Sensory     Proprioception: intact   Monofilament testing: intact  Vascular  Capillary refills: < 3 seconds  The right DP pulse is 2+  The right PT pulse is 2+  Left Foot/Ankle  Left Foot Inspection  Skin Exam: skin normal and skin intactno dry skin, no warmth, no erythema, no maceration, normal color, no pre-ulcer, no ulcer and no callus                         Toe Exam: ROM and strength within normal limits                   Sensory     Proprioception: intact  Monofilament: intact  Vascular  Capillary refills: < 3 seconds  The left DP pulse is 2+  The left PT pulse is 2+  Assign Risk Category:  No deformity present; No loss of protective sensation; No weak pulses       Risk: 0     Physical Exam   Constitutional: She is oriented to person, place, and time  She appears well-developed and well-nourished  No distress  HENT:   Head: Normocephalic  Right Ear: Tympanic membrane and external ear normal  No decreased hearing is noted  Left Ear: Tympanic membrane and external ear normal  No decreased hearing is noted  Mouth/Throat: Oropharynx is clear and moist    Eyes: Pupils are equal, round, and reactive to light  Conjunctivae are normal    Neck: Normal range of motion  Neck supple  No thyromegaly present  Cardiovascular: Normal rate, regular rhythm, normal heart sounds and intact distal pulses  Pulses are no weak pulses  No murmur heard  Pulses:       Dorsalis pedis pulses are 2+ on the right side, and 2+ on the left side          Posterior tibial pulses are 2+ on the right side, and 2+ on the left side  Pulmonary/Chest: Effort normal and breath sounds normal  No respiratory distress  She has no wheezes  She has no rales  diminished   Abdominal: Soft  Bowel sounds are normal    Musculoskeletal: Normal range of motion  Feet:   Right Foot:   Skin Integrity: Negative for ulcer, skin breakdown, erythema, warmth, callus or dry skin  Left Foot:   Skin Integrity: Negative for ulcer, skin breakdown, erythema, warmth, callus or dry skin  Lymphadenopathy:     She has no cervical adenopathy  Neurological: She is alert and oriented to person, place, and time  She has normal reflexes  No cranial nerve deficit  Coordination normal    Skin: Skin is warm and dry  Psychiatric: She has a normal mood and affect   Her behavior is normal  Judgment and thought content normal

## 2019-08-21 ENCOUNTER — OFFICE VISIT (OUTPATIENT)
Dept: FAMILY MEDICINE CLINIC | Facility: CLINIC | Age: 64
End: 2019-08-21
Payer: COMMERCIAL

## 2019-08-21 VITALS
RESPIRATION RATE: 17 BRPM | BODY MASS INDEX: 32.61 KG/M2 | HEIGHT: 64 IN | DIASTOLIC BLOOD PRESSURE: 74 MMHG | WEIGHT: 191 LBS | OXYGEN SATURATION: 99 % | SYSTOLIC BLOOD PRESSURE: 126 MMHG | TEMPERATURE: 98.2 F | HEART RATE: 78 BPM

## 2019-08-21 DIAGNOSIS — G25.81 RESTLESS LEGS SYNDROME: ICD-10-CM

## 2019-08-21 DIAGNOSIS — J30.9 ALLERGIC RHINITIS, UNSPECIFIED SEASONALITY, UNSPECIFIED TRIGGER: ICD-10-CM

## 2019-08-21 DIAGNOSIS — Z23 NEED FOR PNEUMOCOCCAL VACCINATION: ICD-10-CM

## 2019-08-21 DIAGNOSIS — E11.59 HYPERTENSION ASSOCIATED WITH DIABETES (HCC): Primary | ICD-10-CM

## 2019-08-21 DIAGNOSIS — R05.3 CHRONIC COUGH: ICD-10-CM

## 2019-08-21 DIAGNOSIS — E78.5 HYPERLIPIDEMIA, UNSPECIFIED HYPERLIPIDEMIA TYPE: ICD-10-CM

## 2019-08-21 DIAGNOSIS — F41.9 ANXIETY: ICD-10-CM

## 2019-08-21 DIAGNOSIS — E11.8 TYPE 2 DIABETES MELLITUS WITH COMPLICATION, WITHOUT LONG-TERM CURRENT USE OF INSULIN (HCC): ICD-10-CM

## 2019-08-21 DIAGNOSIS — I15.2 HYPERTENSION ASSOCIATED WITH DIABETES (HCC): Primary | ICD-10-CM

## 2019-08-21 DIAGNOSIS — K21.9 GASTROESOPHAGEAL REFLUX DISEASE WITHOUT ESOPHAGITIS: ICD-10-CM

## 2019-08-21 DIAGNOSIS — N95.1 HOT FLASHES DUE TO MENOPAUSE: ICD-10-CM

## 2019-08-21 DIAGNOSIS — J45.21 MILD INTERMITTENT ASTHMA WITH ACUTE EXACERBATION: ICD-10-CM

## 2019-08-21 LAB
CREAT UR-MCNC: 34.4 MG/DL
MICROALBUMIN UR-MCNC: 5.7 MG/L (ref 0–20)
MICROALBUMIN/CREAT 24H UR: 17 MG/G CREATININE (ref 0–30)

## 2019-08-21 PROCEDURE — 1036F TOBACCO NON-USER: CPT | Performed by: NURSE PRACTITIONER

## 2019-08-21 PROCEDURE — 3008F BODY MASS INDEX DOCD: CPT | Performed by: NURSE PRACTITIONER

## 2019-08-21 PROCEDURE — 90732 PPSV23 VACC 2 YRS+ SUBQ/IM: CPT

## 2019-08-21 PROCEDURE — 82570 ASSAY OF URINE CREATININE: CPT | Performed by: NURSE PRACTITIONER

## 2019-08-21 PROCEDURE — 3061F NEG MICROALBUMINURIA REV: CPT | Performed by: NURSE PRACTITIONER

## 2019-08-21 PROCEDURE — 90471 IMMUNIZATION ADMIN: CPT

## 2019-08-21 PROCEDURE — 82043 UR ALBUMIN QUANTITATIVE: CPT | Performed by: NURSE PRACTITIONER

## 2019-08-21 PROCEDURE — 99215 OFFICE O/P EST HI 40 MIN: CPT | Performed by: NURSE PRACTITIONER

## 2019-08-21 RX ORDER — MONTELUKAST SODIUM 10 MG/1
10 TABLET ORAL DAILY
Qty: 90 TABLET | Refills: 1 | Status: SHIPPED | OUTPATIENT
Start: 2019-08-21 | End: 2020-05-11 | Stop reason: SDUPTHER

## 2019-08-21 RX ORDER — CLONAZEPAM 0.5 MG/1
0.5 TABLET ORAL DAILY PRN
Qty: 30 TABLET | Refills: 0 | Status: SHIPPED | OUTPATIENT
Start: 2019-08-21 | End: 2019-09-30 | Stop reason: SDUPTHER

## 2019-08-21 RX ORDER — CETIRIZINE HYDROCHLORIDE 10 MG/1
10 TABLET ORAL DAILY
Qty: 90 TABLET | Refills: 0 | Status: SHIPPED | OUTPATIENT
Start: 2019-08-21 | End: 2020-02-26 | Stop reason: SDUPTHER

## 2019-08-21 RX ORDER — GABAPENTIN 100 MG/1
100 CAPSULE ORAL
Qty: 90 CAPSULE | Refills: 1 | Status: SHIPPED | OUTPATIENT
Start: 2019-08-21 | End: 2020-02-26 | Stop reason: SDUPTHER

## 2019-08-21 RX ORDER — RANITIDINE 150 MG/1
150 TABLET ORAL 2 TIMES DAILY PRN
Qty: 60 TABLET | Refills: 3 | Status: SHIPPED | OUTPATIENT
Start: 2019-08-21 | End: 2020-02-26 | Stop reason: SDUPTHER

## 2019-08-21 RX ORDER — ATORVASTATIN CALCIUM 20 MG/1
20 TABLET, FILM COATED ORAL DAILY
Qty: 90 TABLET | Refills: 1 | Status: SHIPPED | OUTPATIENT
Start: 2019-08-21 | End: 2019-12-02 | Stop reason: SDUPTHER

## 2019-08-21 NOTE — ASSESSMENT & PLAN NOTE
Lab Results   Component Value Date    HGBA1C 6 4 (H) 08/17/2019       No results for input(s): POCGLU in the last 72 hours      Blood Sugar Average: Last 72 hrs:  stable on arb -avapro  150 mg daily

## 2019-08-21 NOTE — ASSESSMENT & PLAN NOTE
Pt not using BREO daily states breakthrough wheezing 1-2 times a week - then using BREO for a rescue - discussed proper use of maintenance inhaler and rescue inhaler  Asked pt to restart BREO daily, use proair as needed for breakthrough wheezing  LUNG sound dimiinshed - no wh/sat stable

## 2019-08-21 NOTE — ASSESSMENT & PLAN NOTE
sxs not controlled on Singulair daily - not using daily Flonase/antihistamine  Try zyrtec - avoid combining w/ benadryl  Use Flonase daily

## 2019-08-21 NOTE — ASSESSMENT & PLAN NOTE
Lab Results   Component Value Date    HGBA1C 6 4 (H) 08/17/2019       No results for input(s): POCGLU in the last 72 hours  Improved w/13 lb weight loss in 6 months  Continue current metformin 1000 mg BID  BP stable  Urine alb sample given  Foot done    Blood Sugar Average: Last 72 hrs:

## 2019-08-21 NOTE — ASSESSMENT & PLAN NOTE
Pt still using protonix - no break through sxs  She is amendable to wean off - start w/ before bed zantac and take as needed during the day  After 1-2 weeks use only if needed

## 2019-09-09 DIAGNOSIS — K21.9 GASTROESOPHAGEAL REFLUX DISEASE WITHOUT ESOPHAGITIS: ICD-10-CM

## 2019-09-09 RX ORDER — PANTOPRAZOLE SODIUM 20 MG/1
20 TABLET, DELAYED RELEASE ORAL DAILY
Qty: 90 TABLET | Refills: 0 | Status: SHIPPED | OUTPATIENT
Start: 2019-09-09 | End: 2020-01-15 | Stop reason: SDUPTHER

## 2019-09-27 DIAGNOSIS — F41.9 ANXIETY: ICD-10-CM

## 2019-09-27 RX ORDER — CLONAZEPAM 0.5 MG/1
0.5 TABLET ORAL DAILY PRN
Qty: 30 TABLET | Refills: 0 | Status: CANCELLED | OUTPATIENT
Start: 2019-09-27

## 2019-09-30 ENCOUNTER — TELEPHONE (OUTPATIENT)
Dept: FAMILY MEDICINE CLINIC | Facility: CLINIC | Age: 64
End: 2019-09-30

## 2019-09-30 DIAGNOSIS — F41.9 ANXIETY: ICD-10-CM

## 2019-09-30 DIAGNOSIS — E78.5 HYPERLIPIDEMIA, UNSPECIFIED HYPERLIPIDEMIA TYPE: ICD-10-CM

## 2019-09-30 RX ORDER — ATORVASTATIN CALCIUM 20 MG/1
20 TABLET, FILM COATED ORAL DAILY
Qty: 90 TABLET | Refills: 1 | OUTPATIENT
Start: 2019-09-30

## 2019-09-30 RX ORDER — CLONAZEPAM 0.5 MG/1
0.5 TABLET ORAL DAILY PRN
Qty: 30 TABLET | Refills: 0 | Status: SHIPPED | OUTPATIENT
Start: 2019-09-30 | End: 2019-11-11 | Stop reason: SDUPTHER

## 2019-10-01 NOTE — TELEPHONE ENCOUNTER
patient switching pharmacies because [facility] does not take her insurance can we call [facility] for the rest of the refills

## 2019-11-11 DIAGNOSIS — F41.9 ANXIETY: ICD-10-CM

## 2019-11-11 RX ORDER — CLONAZEPAM 0.5 MG/1
0.5 TABLET ORAL DAILY PRN
Qty: 30 TABLET | Refills: 0 | Status: SHIPPED | OUTPATIENT
Start: 2019-11-11 | End: 2020-01-15 | Stop reason: SDUPTHER

## 2019-11-12 DIAGNOSIS — I15.2 HYPERTENSION ASSOCIATED WITH DIABETES (HCC): ICD-10-CM

## 2019-11-12 DIAGNOSIS — E11.59 HYPERTENSION ASSOCIATED WITH DIABETES (HCC): ICD-10-CM

## 2019-11-13 PROCEDURE — 4010F ACE/ARB THERAPY RXD/TAKEN: CPT | Performed by: NURSE PRACTITIONER

## 2019-11-13 RX ORDER — IRBESARTAN 150 MG/1
150 TABLET ORAL DAILY
Qty: 90 TABLET | Refills: 0 | Status: SHIPPED | OUTPATIENT
Start: 2019-11-13 | End: 2020-02-26 | Stop reason: SDUPTHER

## 2019-12-02 DIAGNOSIS — E78.5 HYPERLIPIDEMIA, UNSPECIFIED HYPERLIPIDEMIA TYPE: ICD-10-CM

## 2019-12-02 RX ORDER — ATORVASTATIN CALCIUM 20 MG/1
20 TABLET, FILM COATED ORAL DAILY
Qty: 90 TABLET | Refills: 0 | Status: SHIPPED | OUTPATIENT
Start: 2019-12-02 | End: 2020-02-26 | Stop reason: SDUPTHER

## 2020-01-15 DIAGNOSIS — F41.9 ANXIETY: ICD-10-CM

## 2020-01-15 DIAGNOSIS — K21.9 GASTROESOPHAGEAL REFLUX DISEASE WITHOUT ESOPHAGITIS: ICD-10-CM

## 2020-01-15 RX ORDER — PANTOPRAZOLE SODIUM 20 MG/1
20 TABLET, DELAYED RELEASE ORAL DAILY
Qty: 90 TABLET | Refills: 0 | Status: SHIPPED | OUTPATIENT
Start: 2020-01-15 | End: 2020-02-26 | Stop reason: ALTCHOICE

## 2020-01-15 RX ORDER — CLONAZEPAM 0.5 MG/1
0.5 TABLET ORAL DAILY PRN
Qty: 30 TABLET | Refills: 0 | Status: SHIPPED | OUTPATIENT
Start: 2020-01-15 | End: 2020-09-02 | Stop reason: ALTCHOICE

## 2020-02-23 LAB
ALBUMIN SERPL-MCNC: 4.7 G/DL (ref 3.6–5.1)
ALBUMIN/GLOB SERPL: 2 (CALC) (ref 1–2.5)
ALP SERPL-CCNC: 99 U/L (ref 37–153)
ALT SERPL-CCNC: 28 U/L (ref 6–29)
AST SERPL-CCNC: 22 U/L (ref 10–35)
BILIRUB SERPL-MCNC: 0.6 MG/DL (ref 0.2–1.2)
BUN SERPL-MCNC: 14 MG/DL (ref 7–25)
BUN/CREAT SERPL: NORMAL (CALC) (ref 6–22)
CALCIUM SERPL-MCNC: 9.9 MG/DL (ref 8.6–10.4)
CHLORIDE SERPL-SCNC: 104 MMOL/L (ref 98–110)
CHOLEST SERPL-MCNC: 145 MG/DL
CHOLEST/HDLC SERPL: 2.8 (CALC)
CO2 SERPL-SCNC: 26 MMOL/L (ref 20–32)
CREAT SERPL-MCNC: 0.75 MG/DL (ref 0.5–0.99)
GLOBULIN SER CALC-MCNC: 2.3 G/DL (CALC) (ref 1.9–3.7)
GLUCOSE SERPL-MCNC: 90 MG/DL (ref 65–99)
HBA1C MFR BLD: 6.4 % OF TOTAL HGB
HDLC SERPL-MCNC: 51 MG/DL
LDLC SERPL CALC-MCNC: 74 MG/DL (CALC)
NONHDLC SERPL-MCNC: 94 MG/DL (CALC)
POTASSIUM SERPL-SCNC: 4.4 MMOL/L (ref 3.5–5.3)
PROT SERPL-MCNC: 7 G/DL (ref 6.1–8.1)
SL AMB EGFR AFRICAN AMERICAN: 99 ML/MIN/1.73M2
SL AMB EGFR NON AFRICAN AMERICAN: 85 ML/MIN/1.73M2
SODIUM SERPL-SCNC: 140 MMOL/L (ref 135–146)
TRIGL SERPL-MCNC: 113 MG/DL
TSH SERPL-ACNC: 3.2 MIU/L (ref 0.4–4.5)

## 2020-02-23 PROCEDURE — 3044F HG A1C LEVEL LT 7.0%: CPT | Performed by: FAMILY MEDICINE

## 2020-02-26 ENCOUNTER — OFFICE VISIT (OUTPATIENT)
Dept: FAMILY MEDICINE CLINIC | Facility: CLINIC | Age: 65
End: 2020-02-26
Payer: COMMERCIAL

## 2020-02-26 VITALS
BODY MASS INDEX: 32.61 KG/M2 | WEIGHT: 191 LBS | HEIGHT: 64 IN | HEART RATE: 109 BPM | SYSTOLIC BLOOD PRESSURE: 120 MMHG | TEMPERATURE: 97.7 F | DIASTOLIC BLOOD PRESSURE: 80 MMHG | OXYGEN SATURATION: 96 %

## 2020-02-26 DIAGNOSIS — E78.5 HYPERLIPIDEMIA, UNSPECIFIED HYPERLIPIDEMIA TYPE: ICD-10-CM

## 2020-02-26 DIAGNOSIS — J30.9 ALLERGIC RHINITIS, UNSPECIFIED SEASONALITY, UNSPECIFIED TRIGGER: ICD-10-CM

## 2020-02-26 DIAGNOSIS — E11.59 HYPERTENSION ASSOCIATED WITH DIABETES (HCC): ICD-10-CM

## 2020-02-26 DIAGNOSIS — K21.9 GASTROESOPHAGEAL REFLUX DISEASE WITHOUT ESOPHAGITIS: ICD-10-CM

## 2020-02-26 DIAGNOSIS — E11.9 TYPE 2 DIABETES MELLITUS WITHOUT COMPLICATION, WITHOUT LONG-TERM CURRENT USE OF INSULIN (HCC): ICD-10-CM

## 2020-02-26 DIAGNOSIS — E11.8 TYPE 2 DIABETES MELLITUS WITH COMPLICATION, WITHOUT LONG-TERM CURRENT USE OF INSULIN (HCC): ICD-10-CM

## 2020-02-26 DIAGNOSIS — Z12.39 SCREENING FOR BREAST CANCER: Primary | ICD-10-CM

## 2020-02-26 DIAGNOSIS — J45.21 MILD INTERMITTENT ASTHMA WITH ACUTE EXACERBATION: ICD-10-CM

## 2020-02-26 DIAGNOSIS — J01.00 SUBACUTE MAXILLARY SINUSITIS: ICD-10-CM

## 2020-02-26 DIAGNOSIS — Z11.59 NEED FOR HEPATITIS C SCREENING TEST: ICD-10-CM

## 2020-02-26 DIAGNOSIS — I15.2 HYPERTENSION ASSOCIATED WITH DIABETES (HCC): ICD-10-CM

## 2020-02-26 DIAGNOSIS — G25.81 RESTLESS LEGS SYNDROME: ICD-10-CM

## 2020-02-26 DIAGNOSIS — F41.9 ANXIETY: ICD-10-CM

## 2020-02-26 PROCEDURE — 3079F DIAST BP 80-89 MM HG: CPT | Performed by: FAMILY MEDICINE

## 2020-02-26 PROCEDURE — 4010F ACE/ARB THERAPY RXD/TAKEN: CPT | Performed by: FAMILY MEDICINE

## 2020-02-26 PROCEDURE — 3074F SYST BP LT 130 MM HG: CPT | Performed by: FAMILY MEDICINE

## 2020-02-26 PROCEDURE — 3008F BODY MASS INDEX DOCD: CPT | Performed by: FAMILY MEDICINE

## 2020-02-26 PROCEDURE — 99396 PREV VISIT EST AGE 40-64: CPT | Performed by: FAMILY MEDICINE

## 2020-02-26 RX ORDER — AZITHROMYCIN 250 MG/1
250 TABLET, FILM COATED ORAL EVERY 24 HOURS
Qty: 6 TABLET | Refills: 0 | Status: SHIPPED | OUTPATIENT
Start: 2020-02-26 | End: 2020-03-02

## 2020-02-26 RX ORDER — IRBESARTAN 150 MG/1
150 TABLET ORAL DAILY
Qty: 90 TABLET | Refills: 1 | Status: SHIPPED | OUTPATIENT
Start: 2020-02-26 | End: 2020-09-02 | Stop reason: SDUPTHER

## 2020-02-26 RX ORDER — CETIRIZINE HYDROCHLORIDE 10 MG/1
10 TABLET ORAL DAILY
Qty: 90 TABLET | Refills: 1 | Status: SHIPPED | OUTPATIENT
Start: 2020-02-26 | End: 2021-06-15 | Stop reason: ALTCHOICE

## 2020-02-26 RX ORDER — RANITIDINE 150 MG/1
150 TABLET ORAL 2 TIMES DAILY PRN
Qty: 180 TABLET | Refills: 1 | Status: SHIPPED | OUTPATIENT
Start: 2020-02-26 | End: 2020-02-28 | Stop reason: CLARIF

## 2020-02-26 RX ORDER — ATORVASTATIN CALCIUM 20 MG/1
20 TABLET, FILM COATED ORAL DAILY
Qty: 90 TABLET | Refills: 1 | Status: SHIPPED | OUTPATIENT
Start: 2020-02-26 | End: 2020-08-18

## 2020-02-26 RX ORDER — GABAPENTIN 100 MG/1
100 CAPSULE ORAL
Qty: 90 CAPSULE | Refills: 1 | Status: SHIPPED | OUTPATIENT
Start: 2020-02-26 | End: 2020-07-07 | Stop reason: SDUPTHER

## 2020-02-26 NOTE — PROGRESS NOTES
[facility] PRACT[facility]    NAME: Bernice Dominguez  AGE: 60 y o  SEX: female  : 1955     DATE: 2020     Assessment and Plan:     Problem List Items Addressed This Visit        Digestive    Gastroesophageal reflux disease    Relevant Medications    ranitidine (ZANTAC) 150 mg tablet       Endocrine    Diabetes mellitus ([facility] [place] [address] )       Lab Results   Component Value Date    HGBA1C 6 4 (H) 2020   Well controlled with diet and metformin  Foot due next visit  No microalbuminuria  Recheck next visit  BP well controlled  [facility] is [facility] [place] [address]  Given form  Lipids well controlled on lipitor 20mg daily  Relevant Medications    metFORMIN (GLUCOPHAGE) 1000 MG tablet    Other Relevant Orders    Hemoglobin K1R    Basic metabolic panel    Hypertension associated with diabetes ([facility] [place] [address] )       Lab Results   Component Value Date    HGBA1C 6 4 (H) 2020   Stable on irbesartan 150 daily            Relevant Medications    metFORMIN (GLUCOPHAGE) 1000 MG tablet    irbesartan (AVAPRO) 150 mg tablet       Respiratory    Allergic rhinitis    Relevant Medications    cetirizine (ZyrTEC) 10 mg tablet    Mild intermittent asthma with acute exacerbation       Other    Anxiety    Relevant Medications    sertraline (ZOLOFT) 50 mg tablet    Hyperlipidemia    Relevant Medications    atorvastatin (LIPITOR) 20 mg tablet    Other Relevant Orders    Lipid panel    Restless legs syndrome    Relevant Medications    sertraline (ZOLOFT) 50 mg tablet    gabapentin (NEURONTIN) 100 mg capsule      Other Visit Diagnoses     Screening for breast cancer    -  Primary    Need for hepatitis C screening test        Relevant Orders    Hepatitis C antibody    Subacute maxillary sinusitis        Relevant Medications    azithromycin (ZITHROMAX) 250 mg tablet    Type 2 diabetes mellitus with complication, without long-term current use of insulin (HCC)        Relevant Medications    metFORMIN (GLUCOPHAGE) 1000 MG tablet          Immunizations and preventive care screenings were discussed with patient today  Appropriate education was printed on patient's after visit summary  breast health services [facility] by alice  Counseling:  Alcohol/drug use: discussed moderation in alcohol intake, the recommendations for healthy alcohol use, and avoidance of illicit drug use  Dental Health: discussed importance of regular tooth brushing, flossing, and dental visits  Sexual health: discussed sexually transmitted diseases, partner selection, use of condoms, avoidance of unintended pregnancy, and contraceptive alternatives  · Exercise: the importance of regular exercise/physical activity was discussed  Recommend exercise 3-5 times per week for at least 30 minutes  BMI Counseling: Body mass index is 32 79 kg/m²  The BMI is above normal  Nutrition recommendations include decreasing portion sizes, encouraging healthy choices of fruits and vegetables, decreasing fast food intake and consuming healthier snacks  Exercise recommendations include moderate physical activity 150 minutes/week  Return in about 6 months (around 8/26/2020) for chronic conditions with labs  Chief Complaint:     Chief Complaint   Patient presents with    Follow-up     6 month       History of Present Illness:     Adult Annual Physical   Patient here for a comprehensive physical exam  The patient reports problems - trouble smelling, sinus pain  Nasal congestion  She has tried using a saline rinse, flonase  She uses breo as needed  Diet and Physical Activity  · Diet/Nutrition: well balanced diet and consuming 3-5 servings of fruits/vegetables daily  · Exercise: walking  Depression Screening  PHQ-9 Depression Screening    PHQ-9:    Frequency of the following problems over the past two weeks:            General Health  · Sleep: sleeps well  · Vision: no vision problems  · Dental: no dental visits for >1 year  /GYN Health  · Last menstrual period: Post menopausal, last period was at age 48  · History of STDs?: no      Review of Systems:     Review of Systems   Constitutional: Negative for fever and unexpected weight change  HENT: Positive for rhinorrhea and sinus pain  Negative for ear pain, sore throat and trouble swallowing  Eyes: Negative for pain and visual disturbance  Respiratory: Positive for cough  Negative for chest tightness, shortness of breath and wheezing  Cardiovascular: Negative for chest pain  Gastrointestinal: Negative for abdominal distention, abdominal pain, blood in stool, constipation, diarrhea, nausea and vomiting  Endocrine: Negative for polydipsia and polyuria  Genitourinary: Negative for dysuria and hematuria  Musculoskeletal: Negative for back pain and myalgias  Skin: Negative for rash  Neurological: Negative for syncope and headaches  Psychiatric/Behavioral: Negative for suicidal ideas  Past Medical History:     Past Medical History:   Diagnosis Date    Chronic cough 5/15/2018    Diabetes mellitus ([facility] [place] [address] )     Gallbladder disease     Hypertension     Insomnia       Past Surgical History:     Past Surgical History:   Procedure Laterality Date    CHOLECYSTECTOMY      NASAL SEPTUM SURGERY      nasal septal deviation repair    MS ESOPHAGOGASTRODUODENOSCOPY TRANSORAL DIAGNOSTIC N/A 2/25/2016    Procedure: EGD AND COLONOSCOPY;  Surgeon: Gianni Henry MD;  Location: [facility];   Service: Gastroenterology      Social History:        Social History     Socioeconomic History    Marital status: /Civil Union     Spouse name: None    Number of children: None    Years of education: None    Highest education level: None   Occupational History    None   Social Needs    Financial resource strain: Not hard at all   Emily-Thad insecurity:     Worry: Never true     Inability: Never true   Katie Diaz Transportation needs:     Medical: No     Non-medical: No   Tobacco Use    Smoking status: Never Smoker    Smokeless tobacco: Never Used    Tobacco comment: no known exposure to tobacco smoke   Substance and Sexual Activity    Alcohol use: Yes     Comment: 1 drink per day - being a social drinker, per [employer]    Drug use: No    Sexual activity: None   Lifestyle    Physical activity:     Days per week: 0 days     Minutes per session: 0 min    Stress: Not at all   Relationships    Social connections:     Talks on phone: Patient refused     Gets together: Patient refused     Attends Sabianist service: Patient refused     Active member of club or organization: Patient refused     Attends meetings of clubs or organizations: Patient refused     Relationship status: Patient refused    Intimate partner violence:     Fear of current or ex partner: No     Emotionally abused: No     Physically abused: No     Forced sexual activity: No   Other Topics Concern    None   Social History Narrative    Lives with     Pt has living will    Always uses seatbelt    Active caffeine use      Family History:     Family History   Problem Relation Age of Onset    Heart attack Father         acute MI    Emphysema Sister     Colonic polyp Sister     Cancer Sister       Current Medications:     Current Outpatient Medications   Medication Sig Dispense Refill    aspirin 81 MG tablet Take 81 mg by mouth once        atorvastatin (LIPITOR) 20 mg tablet Take 1 tablet (20 mg total) by mouth daily 90 tablet 1    azithromycin (ZITHROMAX) 250 mg tablet Take 1 tablet (250 mg total) by mouth every 24 hours for 5 days 6 tablet 0    BREO ELLIPTA 100-25 MCG/INH inhaler INHALE 1 PUFF DAILY RINSE MOUTH AFTER USE  1 Inhaler 1    calcium-vitamin D (OSCAL) 250-125 MG-UNIT per tablet Take 1 tablet by mouth daily        cetirizine (ZyrTEC) 10 mg tablet Take 1 tablet (10 mg total) by mouth daily 90 tablet 1    clonazePAM (KlonoPIN) 0 5 mg tablet Take 1 tablet (0 5 mg total) by mouth daily as needed for anxiety 30 tablet 0    gabapentin (NEURONTIN) 100 mg capsule Take 1 capsule (100 mg total) by mouth daily at bedtime 90 capsule 1    irbesartan (AVAPRO) 150 mg tablet Take 1 tablet (150 mg total) by mouth daily 90 tablet 1    metFORMIN (GLUCOPHAGE) 1000 MG tablet Take 1 tablet (1,000 mg total) by mouth 2 (two) times a day with meals 180 tablet 1    montelukast (SINGULAIR) 10 mg tablet Take 1 tablet (10 mg total) by mouth daily 90 tablet 1    Multiple Vitamin (MULTIVITAMIN) tablet Take 1 tablet by mouth daily   PROAIR  (90 Base) MCG/ACT inhaler Inhale 2 puffs every 6 (six) hours as needed for wheezing or shortness of breath every 4 - 6 hours as needed 1 Inhaler 0    ranitidine (ZANTAC) 150 mg tablet Take 1 tablet (150 mg total) by mouth 2 (two) times a day as needed for heartburn or indigestion 180 tablet 1    sertraline (ZOLOFT) 50 mg tablet Take 1 tablet (50 mg total) by mouth daily 90 tablet 1     No current facility-administered medications for this visit  Allergies: Allergies   Allergen Reactions    Cephalexin      Annotation - [id number]: n/v    Other       Physical Exam:     /80 (BP Location: Left arm, Patient Position: Sitting, Cuff Size: Large)   Pulse (!) 109   Temp 97 7 °F (36 5 °C) (Oral)   Ht 5' 4" (1 626 m)   Wt 86 6 kg (191 lb)   SpO2 96%   BMI 32 79 kg/m²     Physical Exam   Constitutional: She is oriented to person, place, and time  She appears well-developed and well-nourished  HENT:   Head: Normocephalic and atraumatic  Right Ear: External ear normal    Left Ear: External ear normal    Mouth/Throat: No oropharyngeal exudate  Maxillary sinus pain   Eyes: Pupils are equal, round, and reactive to light  Conjunctivae and EOM are normal  No scleral icterus  Neck: Normal range of motion  Neck supple  Cardiovascular: Normal rate, regular rhythm and intact distal pulses   Exam reveals no gallop and no friction rub  No murmur heard  Pulmonary/Chest: Effort normal and breath sounds normal  No respiratory distress  She has no wheezes  She has no rales  Abdominal: Soft  Bowel sounds are normal  She exhibits no distension and no mass  There is no tenderness  There is no rebound  Musculoskeletal: Normal range of motion  She exhibits no edema  Neurological: She is alert and oriented to person, place, and time  Skin: Skin is warm and dry  Psychiatric: She has a normal mood and affect         Serafin Acuña MD   [address] [place] [address]

## 2020-02-26 NOTE — ASSESSMENT & PLAN NOTE
Lab Results   Component Value Date    HGBA1C 6 4 (H) 02/22/2020   Well controlled with diet and metformin  Foot due next visit  No microalbuminuria  Recheck next visit  BP well controlled  [facility] is [facility] [place] [address]  Given form  Lipids well controlled on lipitor 20mg daily

## 2020-02-27 ENCOUNTER — TELEPHONE (OUTPATIENT)
Dept: ADMINISTRATIVE | Facility: OTHER | Age: 65
End: 2020-02-27

## 2020-02-27 ENCOUNTER — TELEPHONE (OUTPATIENT)
Dept: FAMILY MEDICINE CLINIC | Facility: CLINIC | Age: 65
End: 2020-02-27

## 2020-02-27 NOTE — TELEPHONE ENCOUNTER
Patient called and said you prescribed her Ranitidine  but her insurance will  not cover it, she want to know if she can continue taking the the Pantoprazole 20 mg   I do not see the Pantoprazole on medication left

## 2020-02-27 NOTE — TELEPHONE ENCOUNTER
Upon review of the In Basket request we were able to note that no further action is required  The patient chart is up to date as a result of a previous request       Any additional questions or concerns should be emailed to the Practice Liaisons via [email or web address] email, please do not reply via In Basket      Thank you  Daily Villanueva MA

## 2020-02-27 NOTE — TELEPHONE ENCOUNTER
----- Message from Zo Suero MD sent at 2/26/2020  2:44 PM EST -----  02/26/20 2:47 PM    Hello, our patient Jose Armando Fleming has had Pap Smear (HPV) aka Cervical Cancer Screening completed/performed  Please assist in updating the patient chart by pulling the document from lab results Tab within Chart Review  The date of service is 10/6/16       Thank you,  Zo Suero MD  [facility]

## 2020-02-28 DIAGNOSIS — K21.9 GASTROESOPHAGEAL REFLUX DISEASE WITHOUT ESOPHAGITIS: Primary | ICD-10-CM

## 2020-02-28 RX ORDER — FAMOTIDINE 40 MG/1
40 TABLET, FILM COATED ORAL
Qty: 30 TABLET | Refills: 2 | Status: SHIPPED | OUTPATIENT
Start: 2020-02-28 | End: 2020-09-02 | Stop reason: SDUPTHER

## 2020-03-30 ENCOUNTER — TELEPHONE (OUTPATIENT)
Dept: FAMILY MEDICINE CLINIC | Facility: CLINIC | Age: 65
End: 2020-03-30

## 2020-03-30 NOTE — TELEPHONE ENCOUNTER
Famotidine and ranitidine not available for a few months and if appropriate the prilosec is available

## 2020-03-31 DIAGNOSIS — K21.9 GASTROESOPHAGEAL REFLUX DISEASE WITHOUT ESOPHAGITIS: Primary | ICD-10-CM

## 2020-03-31 RX ORDER — OMEPRAZOLE 20 MG/1
20 CAPSULE, DELAYED RELEASE ORAL DAILY
Qty: 30 CAPSULE | Refills: 1 | Status: SHIPPED | OUTPATIENT
Start: 2020-03-31 | End: 2021-06-15 | Stop reason: ALTCHOICE

## 2020-05-11 DIAGNOSIS — R05.3 CHRONIC COUGH: ICD-10-CM

## 2020-05-11 RX ORDER — MONTELUKAST SODIUM 10 MG/1
10 TABLET ORAL DAILY
Qty: 90 TABLET | Refills: 1 | Status: SHIPPED | OUTPATIENT
Start: 2020-05-11 | End: 2020-09-02 | Stop reason: SDUPTHER

## 2020-07-07 DIAGNOSIS — G25.81 RESTLESS LEGS SYNDROME: ICD-10-CM

## 2020-07-07 RX ORDER — GABAPENTIN 100 MG/1
100 CAPSULE ORAL
Qty: 90 CAPSULE | Refills: 1 | Status: SHIPPED | OUTPATIENT
Start: 2020-07-07 | End: 2020-09-02 | Stop reason: SDUPTHER

## 2020-08-11 DIAGNOSIS — G25.81 RESTLESS LEGS SYNDROME: ICD-10-CM

## 2020-08-11 DIAGNOSIS — F41.9 ANXIETY: ICD-10-CM

## 2020-08-18 DIAGNOSIS — E78.5 HYPERLIPIDEMIA, UNSPECIFIED HYPERLIPIDEMIA TYPE: ICD-10-CM

## 2020-08-18 RX ORDER — ATORVASTATIN CALCIUM 20 MG/1
TABLET, FILM COATED ORAL
Qty: 90 TABLET | Refills: 1 | Status: SHIPPED | OUTPATIENT
Start: 2020-08-18 | End: 2020-09-02 | Stop reason: SDUPTHER

## 2020-09-01 LAB
BUN SERPL-MCNC: 13 MG/DL (ref 7–25)
BUN/CREAT SERPL: NORMAL (CALC) (ref 6–22)
CALCIUM SERPL-MCNC: 9.7 MG/DL (ref 8.6–10.4)
CHLORIDE SERPL-SCNC: 105 MMOL/L (ref 98–110)
CHOLEST SERPL-MCNC: 161 MG/DL
CHOLEST/HDLC SERPL: 3.5 (CALC)
CO2 SERPL-SCNC: 26 MMOL/L (ref 20–32)
CREAT SERPL-MCNC: 0.73 MG/DL (ref 0.5–0.99)
GLUCOSE SERPL-MCNC: 81 MG/DL (ref 65–99)
HBA1C MFR BLD: 5.8 % OF TOTAL HGB
HCV AB S/CO SERPL IA: 0.02
HCV AB SERPL QL IA: NORMAL
HDLC SERPL-MCNC: 46 MG/DL
LDLC SERPL CALC-MCNC: 93 MG/DL (CALC)
NONHDLC SERPL-MCNC: 115 MG/DL (CALC)
POTASSIUM SERPL-SCNC: 4.3 MMOL/L (ref 3.5–5.3)
SL AMB EGFR AFRICAN AMERICAN: 102 ML/MIN/1.73M2
SL AMB EGFR NON AFRICAN AMERICAN: 88 ML/MIN/1.73M2
SODIUM SERPL-SCNC: 141 MMOL/L (ref 135–146)
TRIGL SERPL-MCNC: 123 MG/DL

## 2020-09-01 PROCEDURE — 3044F HG A1C LEVEL LT 7.0%: CPT | Performed by: FAMILY MEDICINE

## 2020-09-02 ENCOUNTER — OFFICE VISIT (OUTPATIENT)
Dept: FAMILY MEDICINE CLINIC | Facility: CLINIC | Age: 65
End: 2020-09-02
Payer: COMMERCIAL

## 2020-09-02 VITALS
HEIGHT: 64 IN | TEMPERATURE: 98.5 F | BODY MASS INDEX: 30.77 KG/M2 | HEART RATE: 92 BPM | DIASTOLIC BLOOD PRESSURE: 90 MMHG | OXYGEN SATURATION: 96 % | RESPIRATION RATE: 16 BRPM | WEIGHT: 180.2 LBS | SYSTOLIC BLOOD PRESSURE: 158 MMHG

## 2020-09-02 DIAGNOSIS — J45.41 MODERATE PERSISTENT ASTHMATIC BRONCHITIS WITH ACUTE EXACERBATION: ICD-10-CM

## 2020-09-02 DIAGNOSIS — R05.3 CHRONIC COUGH: ICD-10-CM

## 2020-09-02 DIAGNOSIS — K21.9 GASTROESOPHAGEAL REFLUX DISEASE WITHOUT ESOPHAGITIS: ICD-10-CM

## 2020-09-02 DIAGNOSIS — Z12.39 SCREENING FOR BREAST CANCER: Primary | ICD-10-CM

## 2020-09-02 DIAGNOSIS — E78.5 HYPERLIPIDEMIA, UNSPECIFIED HYPERLIPIDEMIA TYPE: ICD-10-CM

## 2020-09-02 DIAGNOSIS — E11.8 TYPE 2 DIABETES MELLITUS WITH COMPLICATION, WITHOUT LONG-TERM CURRENT USE OF INSULIN (HCC): ICD-10-CM

## 2020-09-02 DIAGNOSIS — E11.9 TYPE 2 DIABETES MELLITUS WITHOUT COMPLICATION, WITHOUT LONG-TERM CURRENT USE OF INSULIN (HCC): ICD-10-CM

## 2020-09-02 DIAGNOSIS — I49.3 PREMATURE VENTRICULAR BEAT: ICD-10-CM

## 2020-09-02 DIAGNOSIS — I15.2 HYPERTENSION ASSOCIATED WITH DIABETES (HCC): ICD-10-CM

## 2020-09-02 DIAGNOSIS — E11.59 HYPERTENSION ASSOCIATED WITH DIABETES (HCC): ICD-10-CM

## 2020-09-02 DIAGNOSIS — G25.81 RESTLESS LEGS SYNDROME: ICD-10-CM

## 2020-09-02 DIAGNOSIS — J45.21 MILD INTERMITTENT ASTHMA WITH ACUTE EXACERBATION: ICD-10-CM

## 2020-09-02 DIAGNOSIS — F41.9 ANXIETY: ICD-10-CM

## 2020-09-02 PROBLEM — J45.30 MILD PERSISTENT ASTHMA WITHOUT COMPLICATION: Status: ACTIVE | Noted: 2019-08-21

## 2020-09-02 PROCEDURE — 1036F TOBACCO NON-USER: CPT | Performed by: FAMILY MEDICINE

## 2020-09-02 PROCEDURE — 99214 OFFICE O/P EST MOD 30 MIN: CPT | Performed by: FAMILY MEDICINE

## 2020-09-02 PROCEDURE — 93000 ELECTROCARDIOGRAM COMPLETE: CPT | Performed by: FAMILY MEDICINE

## 2020-09-02 PROCEDURE — 3725F SCREEN DEPRESSION PERFORMED: CPT | Performed by: FAMILY MEDICINE

## 2020-09-02 PROCEDURE — 4010F ACE/ARB THERAPY RXD/TAKEN: CPT | Performed by: FAMILY MEDICINE

## 2020-09-02 PROCEDURE — 3080F DIAST BP >= 90 MM HG: CPT | Performed by: FAMILY MEDICINE

## 2020-09-02 PROCEDURE — 3077F SYST BP >= 140 MM HG: CPT | Performed by: FAMILY MEDICINE

## 2020-09-02 RX ORDER — FAMOTIDINE 40 MG/1
40 TABLET, FILM COATED ORAL
Qty: 90 TABLET | Refills: 1 | Status: SHIPPED | OUTPATIENT
Start: 2020-09-02 | End: 2021-02-25

## 2020-09-02 RX ORDER — MONTELUKAST SODIUM 10 MG/1
10 TABLET ORAL DAILY
Qty: 90 TABLET | Refills: 1 | Status: SHIPPED | OUTPATIENT
Start: 2020-09-02 | End: 2021-05-03

## 2020-09-02 RX ORDER — GABAPENTIN 100 MG/1
100 CAPSULE ORAL
Qty: 90 CAPSULE | Refills: 1 | Status: SHIPPED | OUTPATIENT
Start: 2020-09-02 | End: 2021-02-23

## 2020-09-02 RX ORDER — FLUTICASONE FUROATE AND VILANTEROL TRIFENATATE 100; 25 UG/1; UG/1
1 POWDER RESPIRATORY (INHALATION) DAILY
Qty: 1 INHALER | Refills: 1 | Status: SHIPPED | OUTPATIENT
Start: 2020-09-02 | End: 2021-06-15

## 2020-09-02 RX ORDER — ATORVASTATIN CALCIUM 20 MG/1
20 TABLET, FILM COATED ORAL DAILY
Qty: 90 TABLET | Refills: 1 | Status: SHIPPED | OUTPATIENT
Start: 2020-09-02 | End: 2021-06-15 | Stop reason: ALTCHOICE

## 2020-09-02 RX ORDER — IRBESARTAN 150 MG/1
150 TABLET ORAL DAILY
Qty: 90 TABLET | Refills: 1 | Status: SHIPPED | OUTPATIENT
Start: 2020-09-02 | End: 2021-05-03

## 2020-09-02 NOTE — PROGRESS NOTES
Assessment/Plan:    Diabetes mellitus ([place] [address] )    Lab Results   Component Value Date    HGBA1C 5 8 (H) 08/31/2020   Well controlled with diet and metformin  Op[facility]mologist is [facility] in the [place] [address]  Lipids well controlled on lipitor 20mg daily  Hypertension associated with diabetes ([place] [address] )    Lab Results   Component Value Date    HGBA1C 5 8 (H) 08/31/2020   -  uncontrolled  - Blood pressure goal per accord trial would be <140/90   - On irbesartan 150  - Restrict daily salt intake up to 2 4 g  - Advised to walk 30 minutes a day 5 times a week and practice stress relieving measures  - return in 2 weeks for bp check       Mild persistent asthma without complication   Continue Breo daily and albuterol as needed  Premature ventricular beat  An EKG was done do to abnormal heart rhythm during bp check  EKG: normal EKG, normal sinus rhythm, occasional PVC noted  Pt is asymptomatic  Diagnoses and all orders for this visit:    Screening for breast cancer  -     Mammo screening bilateral w cad; Future    Type 2 diabetes mellitus without complication, without long-term current use of insulin ([facility])  -     Basic metabolic panel; Future  -     Hemoglobin A1C; Future  -     Lipid panel; Future    Hypertension associated with diabetes ([place] [address] )  -     irbesartan (AVAPRO) 150 mg tablet; Take 1 tablet (150 mg total) by mouth daily    Moderate persistent asthmatic bronchitis with acute exacerbation  -     fluticasone-vilanterol (Breo Ellipta) 100-25 mcg/inh inhaler; Inhale 1 puff daily Rinse mouth after use  Hyperlipidemia, unspecified hyperlipidemia type  -     atorvastatin (LIPITOR) 20 mg tablet; Take 1 tablet (20 mg total) by mouth daily    Type 2 diabetes mellitus with complication, without long-term current use of insulin ([facility])  -     metFORMIN (GLUCOPHAGE) 1000 MG tablet;  Take 1 tablet (1,000 mg total) by mouth 2 (two) times a day with meals    Chronic cough  -     montelukast (SINGULAIR) 10 mg tablet; Take 1 tablet (10 mg total) by mouth daily    Restless legs syndrome  -     gabapentin (NEURONTIN) 100 mg capsule; Take 1 capsule (100 mg total) by mouth daily at bedtime  -     sertraline (ZOLOFT) 50 mg tablet; Take 1 tablet (50 mg total) by mouth daily    Mild intermittent asthma with acute exacerbation  -     ProAir  (90 Base) MCG/ACT inhaler; Inhale 2 puffs every 6 (six) hours as needed for wheezing or shortness of breath every 4 - 6 hours as needed    Anxiety  -     sertraline (ZOLOFT) 50 mg tablet; Take 1 tablet (50 mg total) by mouth daily    Gastroesophageal reflux disease without esophagitis  -     famotidine (PEPCID) 40 MG tablet; Take 1 tablet (40 mg total) by mouth daily at bedtime as needed for heartburn    Premature ventricular beat  -     POCT ECG          Subjective:  Chronic conditions checkup     Patient ID: Lila Pugh is a 60 y o  female with a history of anxiety, diabetes mellitus type 2, GERD, hyperlipidemia, mild intermittent asthma,    HPI   labs reviewed with Trent Dudley  Chem profile included sodium 141, potassium 4 3, BUN 13, creatinine 0 73, GFR 88  Lipid panel included total cholesterol 161, triglycerides 123, HDL 46, LDL 93  Hemoglobin A1c improved from 6 4-> 5 8  hepatitis-C antibody nonreactive         The following portions of the patient's history were reviewed and updated as appropriate:   She   Patient Active Problem List    Diagnosis Date Noted    Premature ventricular beat 09/02/2020    Mild persistent asthma without complication 00/90/0333    Allergic rhinitis 05/15/2018    Restless legs syndrome 02/08/2017    Eczema 10/06/2016    Gastroesophageal reflux disease 12/15/2015    Hot flashes due to menopause 12/07/2015    Anxiety 09/17/2015    Diabetes mellitus ([facility] [address] ) 09/15/2015    Hyperlipidemia 11/07/2013    Hy[facility]ension associated with diabetes [facility] ) 11/07/2013     She  has a past surgical history that includes pr esophagogastroduodenoscopy transoral diagnostic (N/A, 2/25/2016); Cholecystectomy; and Nasal septum surgery  Her family history includes Cancer in her sister; Colonic polyp in her sister; Emphysema in her sister; Heart attack in her father  She  reports that she has never smoked  She has never used smokeless tobacco  She reports current alcohol use  She reports that she does not use drugs  Current Outpatient Medications   Medication Sig Dispense Refill    atorvastatin (LIPITOR) 20 mg tablet Take 1 tablet (20 mg total) by mouth daily 90 tablet 1    calcium-vitamin D (OSCAL) 250-125 MG-UNIT per tablet Take 1 tablet by mouth daily   famotidine (PEPCID) 40 MG tablet Take 1 tablet (40 mg total) by mouth daily at bedtime as needed for heartburn 90 tablet 1    fluticasone-vilanterol (Breo Ellipta) 100-25 mcg/inh inhaler Inhale 1 puff daily Rinse mouth after use  1 Inhaler 1    gabapentin (NEURONTIN) 100 mg capsule Take 1 capsule (100 mg total) by mouth daily at bedtime 90 capsule 1    irbesartan (AVAPRO) 150 mg tablet Take 1 tablet (150 mg total) by mouth daily 90 tablet 1    metFORMIN (GLUCOPHAGE) 1000 MG tablet Take 1 tablet (1,000 mg total) by mouth 2 (two) times a day with meals 180 tablet 1    montelukast (SINGULAIR) 10 mg tablet Take 1 tablet (10 mg total) by mouth daily 90 tablet 1    Multiple Vitamin (MULTIVITAMIN) tablet Take 1 tablet by mouth daily   omeprazole (PriLOSEC) 20 mg delayed release capsule Take 1 capsule (20 mg total) by mouth daily 30 capsule 1    ProAir  (90 Base) MCG/ACT inhaler Inhale 2 puffs every 6 (six) hours as needed for wheezing or shortness of breath every 4 - 6 hours as needed 1 Inhaler 0    sertraline (ZOLOFT) 50 mg tablet Take 1 tablet (50 mg total) by mouth daily 90 tablet 1    cetirizine (ZyrTEC) 10 mg tablet Take 1 tablet (10 mg total) by mouth daily (Patient not taking: Reported on 9/2/2020) 90 tablet 1     No current facility-administered medications for this visit           Review of Systems   Constitutional: Negative for fever and unexpected weight change  HENT: Negative for ear pain, sore throat and trouble swallowing  Eyes: Negative for pain and visual disturbance  Respiratory: Negative for cough, chest tightness, shortness of breath and wheezing  Cardiovascular: Negative for chest pain  Gastrointestinal: Negative for abdominal distention, abdominal pain, blood in stool, constipation, diarrhea, nausea and vomiting  Endocrine: Negative for polydipsia and polyuria  Genitourinary: Negative for dysuria and hematuria  Musculoskeletal: Negative for back pain and myalgias  Skin: Negative for rash  Neurological: Negative for syncope and headaches  Psychiatric/Behavioral: Negative for suicidal ideas  PHQ-9 Depression Screening    PHQ-9:    Frequency of the following problems over the past two weeks:       Little interest or pleasure in doing things:  0 - not at all  Feeling down, depressed, or hopeless:  0 - not at all  PHQ-2 Score:  0           Objective:      /90 (BP Location: Left arm, Patient Position: Sitting, Cuff Size: Adult)   Pulse 92   Temp 98 5 °F (36 9 °C) (Tympanic)   Resp 16   Ht 5' 4" (1 626 m)   Wt 81 7 kg (180 lb 3 2 oz)   SpO2 96%   BMI 30 93 kg/m²          Physical Exam  Constitutional:       Appearance: She is well-developed  HENT:      Head: Normocephalic and atraumatic  Right Ear: External ear normal       Left Ear: External ear normal       Mouth/Throat:      Pharynx: No oropharyngeal exudate  Eyes:      General: No scleral icterus  Conjunctiva/sclera: Conjunctivae normal       Pupils: Pupils are equal, round, and reactive to light  Neck:      Musculoskeletal: Normal range of motion and neck supple  Cardiovascular:      Rate and Rhythm: Normal rate and regular rhythm  Pulses: no weak pulses          Dorsalis pedis pulses are 2+ on the right side and 2+ on the left side          Posterior tibial pulses are 2+ on the right side and 2+ on the left side  Heart sounds: No murmur  No friction rub  No gallop  Pulmonary:      Effort: Pulmonary effort is normal  No respiratory distress  Breath sounds: Normal breath sounds  No wheezing or rales  Abdominal:      General: Bowel sounds are normal  There is no distension  Palpations: Abdomen is soft  There is no mass  Tenderness: There is no abdominal tenderness  There is no rebound  Musculoskeletal: Normal range of motion  Feet:      Right foot:      Skin integrity: No ulcer, skin breakdown, erythema, warmth, callus or dry skin  Left foot:      Skin integrity: No ulcer, skin breakdown, erythema, warmth, callus or dry skin  Skin:     General: Skin is warm and dry  Neurological:      Mental Status: She is alert and oriented to person, place, and time  Patient's shoes and socks removed  Right Foot/Ankle   Right Foot Inspection  Skin Exam: skin normal and skin intact no dry skin, no warmth, no callus, no erythema, no maceration, no abnormal color, no pre-ulcer, no ulcer and no callus                          Toe Exam: ROM and strength within normal limits  Sensory   Vibration: intact  Proprioception: intact   Monofilament testing: intact  Vascular  Capillary refills: < 3 seconds  The right DP pulse is 2+  The right PT pulse is 2+  Left Foot/Ankle  Left Foot Inspection  Skin Exam: skin normal and skin intactno dry skin, no warmth, no erythema, no maceration, normal color, no pre-ulcer, no ulcer and no callus                         Toe Exam: ROM and strength within normal limits                   Sensory   Vibration: intact  Proprioception: intact  Monofilament: intact  Vascular  Capillary refills: < 3 seconds  The left DP pulse is 2+  The left PT pulse is 2+  Assign Risk Category:  No deformity present; No loss of protective sensation;  No weak pulses       Risk: 0    Recent Results (from the past 672 hour(s))   Lipid panel    Collection Time: 08/31/20  8:42 AM   Result Value Ref Range    Total Cholesterol 161 <200 mg/dL    HDL 46 (L) > OR = 50 mg/dL    Triglycerides 123 <150 mg/dL    LDL Calculated 93 mg/dL (calc)    Chol HDLC Ratio 3 5 <5 0 (calc)    Non-HDL Cholesterol 115 <130 mg/dL (calc)   Basic metabolic panel    Collection Time: 08/31/20  8:42 AM   Result Value Ref Range    Glucose, Random 81 65 - 99 mg/dL    BUN 13 7 - 25 mg/dL    Creatinine 0 73 0 50 - 0 99 mg/dL    eGFR Non  88 > OR = 60 mL/min/1 73m2    eGFR  102 > OR = 60 mL/min/1 73m2    SL AMB BUN/CREATININE RATIO NOT APPLICABLE 6 - 22 (calc)    Sodium 141 135 - 146 mmol/L    Potassium 4 3 3 5 - 5 3 mmol/L    Chloride 105 98 - 110 mmol/L    CO2 26 20 - 32 mmol/L    Calcium 9 7 8 6 - 10 4 mg/dL   Hepatitis C Ab W/Refl To HCV RNA, Qn, PCR    Collection Time: 08/31/20  8:42 AM   Result Value Ref Range    HEP C AB NON-REACTIVE NON-REACTIVE    Signal to Cut-Off 0 02 <1 00   Hemoglobin A1c (w/out EAG)    Collection Time: 08/31/20  8:42 AM   Result Value Ref Range    Hemoglobin A1C 5 8 (H) <5 7 % of total Hgb

## 2020-09-02 NOTE — ASSESSMENT & PLAN NOTE
An EKG was done do to abnormal heart rhythm during bp check  EKG: normal EKG, normal sinus rhythm, occasional PVC noted  Pt is asymptomatic

## 2020-09-02 NOTE — ASSESSMENT & PLAN NOTE
Lab Results   Component Value Date    HGBA1C 5 8 (H) 08/31/2020   Well controlled with diet and metformin  [facility] is [facility] [place] [address]  Lipids well controlled on lipitor 20mg daily

## 2020-09-02 NOTE — ASSESSMENT & PLAN NOTE
Lab Results   Component Value Date    HGBA1C 5 8 (H) 08/31/2020   -  uncontrolled  - Blood pressure goal per accord trial would be <140/90   - On irbesartan 150  - Restrict daily salt intake up to 2 4 g  - Advised to walk 30 minutes a day 5 times a week and practice stress relieving measures  - return in 2 weeks for bp check

## 2020-09-21 ENCOUNTER — CLINICAL SUPPORT (OUTPATIENT)
Dept: FAMILY MEDICINE CLINIC | Facility: CLINIC | Age: 65
End: 2020-09-21

## 2020-09-21 VITALS — SYSTOLIC BLOOD PRESSURE: 120 MMHG | DIASTOLIC BLOOD PRESSURE: 64 MMHG

## 2020-09-21 DIAGNOSIS — Z01.30 BP CHECK: Primary | ICD-10-CM

## 2021-02-23 DIAGNOSIS — E11.8 TYPE 2 DIABETES MELLITUS WITH COMPLICATION, WITHOUT LONG-TERM CURRENT USE OF INSULIN (HCC): ICD-10-CM

## 2021-02-23 DIAGNOSIS — G25.81 RESTLESS LEGS SYNDROME: ICD-10-CM

## 2021-02-23 RX ORDER — GABAPENTIN 100 MG/1
CAPSULE ORAL
Qty: 90 CAPSULE | Refills: 1 | Status: SHIPPED | OUTPATIENT
Start: 2021-02-23 | End: 2021-08-23

## 2021-02-25 DIAGNOSIS — K21.9 GASTROESOPHAGEAL REFLUX DISEASE WITHOUT ESOPHAGITIS: ICD-10-CM

## 2021-02-25 RX ORDER — FAMOTIDINE 40 MG/1
TABLET, FILM COATED ORAL
Qty: 90 TABLET | Refills: 1 | Status: SHIPPED | OUTPATIENT
Start: 2021-02-25 | End: 2021-08-25

## 2021-02-28 LAB
BUN SERPL-MCNC: 18 MG/DL (ref 7–25)
BUN/CREAT SERPL: NORMAL (CALC) (ref 6–22)
CALCIUM SERPL-MCNC: 9.3 MG/DL (ref 8.6–10.4)
CHLORIDE SERPL-SCNC: 105 MMOL/L (ref 98–110)
CHOLEST SERPL-MCNC: 139 MG/DL
CHOLEST/HDLC SERPL: 3.2 (CALC)
CO2 SERPL-SCNC: 27 MMOL/L (ref 20–32)
CREAT SERPL-MCNC: 0.81 MG/DL (ref 0.5–0.99)
GLUCOSE SERPL-MCNC: 86 MG/DL (ref 65–99)
HBA1C MFR BLD: 5.9 % OF TOTAL HGB
HDLC SERPL-MCNC: 44 MG/DL
LDLC SERPL CALC-MCNC: 69 MG/DL (CALC)
NONHDLC SERPL-MCNC: 95 MG/DL (CALC)
POTASSIUM SERPL-SCNC: 4.5 MMOL/L (ref 3.5–5.3)
SL AMB EGFR AFRICAN AMERICAN: 90 ML/MIN/1.73M2
SL AMB EGFR NON AFRICAN AMERICAN: 77 ML/MIN/1.73M2
SODIUM SERPL-SCNC: 141 MMOL/L (ref 135–146)
TRIGL SERPL-MCNC: 189 MG/DL

## 2021-02-28 PROCEDURE — 3044F HG A1C LEVEL LT 7.0%: CPT | Performed by: FAMILY MEDICINE

## 2021-03-11 ENCOUNTER — OFFICE VISIT (OUTPATIENT)
Dept: FAMILY MEDICINE CLINIC | Facility: CLINIC | Age: 66
End: 2021-03-11
Payer: COMMERCIAL

## 2021-03-11 VITALS
HEIGHT: 64 IN | DIASTOLIC BLOOD PRESSURE: 80 MMHG | BODY MASS INDEX: 31.82 KG/M2 | SYSTOLIC BLOOD PRESSURE: 122 MMHG | HEART RATE: 94 BPM | TEMPERATURE: 98.4 F | RESPIRATION RATE: 16 BRPM | WEIGHT: 186.4 LBS | OXYGEN SATURATION: 96 %

## 2021-03-11 DIAGNOSIS — E11.59 HYPERTENSION ASSOCIATED WITH DIABETES (HCC): ICD-10-CM

## 2021-03-11 DIAGNOSIS — E78.5 HYPERLIPIDEMIA, UNSPECIFIED HYPERLIPIDEMIA TYPE: ICD-10-CM

## 2021-03-11 DIAGNOSIS — L30.9 ECZEMA, UNSPECIFIED TYPE: ICD-10-CM

## 2021-03-11 DIAGNOSIS — J45.30 MILD PERSISTENT ASTHMA WITHOUT COMPLICATION: ICD-10-CM

## 2021-03-11 DIAGNOSIS — Z00.00 ANNUAL PHYSICAL EXAM: ICD-10-CM

## 2021-03-11 DIAGNOSIS — I15.2 HYPERTENSION ASSOCIATED WITH DIABETES (HCC): ICD-10-CM

## 2021-03-11 DIAGNOSIS — E11.9 TYPE 2 DIABETES MELLITUS WITHOUT COMPLICATION, WITHOUT LONG-TERM CURRENT USE OF INSULIN (HCC): Primary | ICD-10-CM

## 2021-03-11 PROCEDURE — 99396 PREV VISIT EST AGE 40-64: CPT | Performed by: FAMILY MEDICINE

## 2021-03-11 PROCEDURE — 1036F TOBACCO NON-USER: CPT | Performed by: FAMILY MEDICINE

## 2021-03-11 PROCEDURE — 3725F SCREEN DEPRESSION PERFORMED: CPT | Performed by: FAMILY MEDICINE

## 2021-03-11 PROCEDURE — 3074F SYST BP LT 130 MM HG: CPT | Performed by: FAMILY MEDICINE

## 2021-03-11 PROCEDURE — 3079F DIAST BP 80-89 MM HG: CPT | Performed by: FAMILY MEDICINE

## 2021-03-11 PROCEDURE — 3008F BODY MASS INDEX DOCD: CPT | Performed by: FAMILY MEDICINE

## 2021-03-11 NOTE — PROGRESS NOTES
BMI Counseling: Body mass index is 32 kg/m²  The BMI is above normal  Nutrition recommendations include reducing portion sizes, decreasing overall calorie intake, 3-5 servings of fruits/vegetables daily and reducing fast food intake  Exercise recommendations include exercising 3-5 times per week

## 2021-03-11 NOTE — PROGRESS NOTES
[facility]    NAME: Clemente Mata  AGE: 63 y o  SEX: female  : 1955     DATE: 3/11/2021     Assessment and Plan:     Problem List Items Addressed This Visit        Endocrine    Diabetes mellitus ([facility] [address] ) - Primary       Lab Results   Component Value Date    HGBA1C 5 9 (H) 2021   Well controlled with diet and metformin  [facility]mologist is [facility] [place] [facility] [place]  Lipids well controlled on lipitor 20mg daily  Relevant Orders    Lipid panel    Hemoglobin D9L    Basic metabolic panel    Hypertension associated with diabetes ([facility] [address] )       Lab Results   Component Value Date    HGBA1C 5 9 (H) 2021   -  controlled  - Blood pressure goal per accord trial would be <140/90   - On irbesartan 150  - Restrict daily salt intake up to 2 4 g  - Advised to walk 30 minutes a day 5 times a week and practice stress relieving measures             Relevant Orders    Basic metabolic panel       Respiratory    Mild persistent asthma without complication       Musculoskeletal and Integument    Eczema     Use betamethason bid for 2 weeks  Do not rub dry  Use a sensitive skin moisturizer and combine with an oil after showering  Do not rub skin dry  Relevant Medications    triamcinolone (KENALOG) 0 1 % ointment       Other    Hyperlipidemia      Well controlled ldl on Lipitor 20  Tg high and HDL low  Discussed increasing activity  Discussed natural ways to decrease tgs however pt does not want to increase good fats in diet  She will restart fish oil  Relevant Orders    Lipid panel    Annual physical exam      Labs reviewed with patient  colonoscopy due now  Patient will follow-up with Dr Irving  Mammogram done  at [facility] breast services  Pap also due this year  Patient will schedule appointment             Other Visit Diagnoses     BMI 32 0-32 9,adult              Immunizations and preventive care screenings were discussed with patient today  Appropriate education was printed on patient's after visit summary  UTD with vaccines  Counseling:  Alcohol/drug use: discussed moderation in alcohol intake, the recommendations for healthy alcohol use, and avoidance of illicit drug use  Dental Health: discussed importance of regular tooth brushing, flossing, and dental visits  · Exercise: the importance of regular exercise/physical activity was discussed  Recommend exercise 3-5 times per week for at least 30 minutes  Return in 6 months (on 9/11/2021)  Chief Complaint:     Chief Complaint   Patient presents with    Follow-up     6 month      History of Present Illness:     Adult Annual Physical   Patient here for a comprehensive physical exam  The patient reports problems - rash  Labs reviewed  Sodium 141, potassium 4 5, BUN 18, creatinine 0 81, GFR 77, total cholesterol 139, triglycerides 189, LDL 69, hemoglobin A1c 5 9  Diet and Physical Activity  · Diet/Nutrition: well balanced diet  · Exercise: walking  Depression Screening  PHQ-9 Depression Screening    PHQ-9:   Frequency of the following problems over the past two weeks:           General Health  · Sleep: sleeps well  · Hearing: no issues  · Vision: goes for regular eye exams  · Dental: no dental visits for >1 year  /GYN Health  · Patient is: postmenopausal       Review of Systems:     Review of Systems   Constitutional: Negative for fever and unexpected weight change  HENT: Negative for ear pain, sore throat and trouble swallowing  Eyes: Negative for pain and visual disturbance  Respiratory: Negative for cough, chest tightness, shortness of breath and wheezing  Cardiovascular: Negative for chest pain  Gastrointestinal: Negative for abdominal distention, abdominal pain, blood in stool, constipation, diarrhea, nausea and vomiting  Endocrine: Negative for polydipsia and polyuria  Genitourinary: Negative for dysuria and hematuria  Musculoskeletal: Negative for back pain and myalgias  Skin: Positive for rash  Neurological: Negative for syncope and headaches  Psychiatric/Behavioral: Negative for suicidal ideas  Past Medical History:     Past Medical History:   Diagnosis Date    Chronic cough 5/15/2018    Diabetes mellitus ([facility] [address] )     Gallbladder disease     Hypertension     Insomnia       Past Surgical History:     Past Surgical History:   Procedure Laterality Date    CHOLECYSTECTOMY      NASAL SEPTUM SURGERY      nasal septal deviation repair    CT ESOPHAGOGASTRODUODENOSCOPY TRANSORAL DIAGNOSTIC N/A 2/25/2016    Procedure: EGD AND COLONOSCOPY;  Surgeon: Isai Tate MD;  Location: [facility];   Service: Gastroenterology      Social History:        Social History     Socioeconomic History    Marital status: /Civil Union     Spouse name: None    Number of children: None    Years of education: None    Highest education level: None   Occupational History    None   Social Needs    Financial resource strain: Not hard at all   Emily-Thad insecurity     Worry: Never true     Inability: Never true    Transportation needs     Medical: No     Non-medical: No   Tobacco Use    Smoking status: Never Smoker    Smokeless tobacco: Never Used    Tobacco comment: no known exposure to tobacco smoke   Substance and Sexual Activity    Alcohol use: Yes     Comment: 1 drink per day - being a social drinker, per [employer]    Drug use: No    Sexual activity: None   Lifestyle    Physical activity     Days per week: 0 days     Minutes per session: 0 min    Stress: Not at all   Relationships    Social connections     Talks on phone: Patient refused     Gets together: Patient refused     Attends Restoration service: Patient refused     Active member of club or organization: Patient refused     Attends meetings of clubs or organizations: Patient refused     Relationship status: Patient refused    Intimate partner violence     Fear of current or ex partner: No     Emotionally abused: No     Physically abused: No     Forced sexual activity: No   Other Topics Concern    None   Social History Narrative    Lives with     Pt has living will    Always uses seatbelt    Active caffeine use      Family History:     Family History   Problem Relation Age of Onset    Heart attack Father         acute MI    Emphysema Sister     Colonic polyp Sister     Cancer Sister       Current Medications:     Current Outpatient Medications   Medication Sig Dispense Refill    atorvastatin (LIPITOR) 20 mg tablet Take 1 tablet (20 mg total) by mouth daily 90 tablet 1    calcium-vitamin D (OSCAL) 250-125 MG-UNIT per tablet Take 1 tablet by mouth daily   famotidine (PEPCID) 40 MG tablet take 1 tablet by mouth daily at bedtime AS NEEDED FOR HEATBURN 90 tablet 1    fluticasone-vilanterol (Breo Ellipta) 100-25 mcg/inh inhaler Inhale 1 puff daily Rinse mouth after use  1 Inhaler 1    gabapentin (NEURONTIN) 100 mg capsule take 1 capsule by mouth at bedtime 90 capsule 1    irbesartan (AVAPRO) 150 mg tablet Take 1 tablet (150 mg total) by mouth daily 90 tablet 1    metFORMIN (GLUCOPHAGE) 1000 MG tablet take 1 tablet by mouth twice a day with food 180 tablet 1    montelukast (SINGULAIR) 10 mg tablet Take 1 tablet (10 mg total) by mouth daily 90 tablet 1    Multiple Vitamin (MULTIVITAMIN) tablet Take 1 tablet by mouth daily        ProAir  (90 Base) MCG/ACT inhaler Inhale 2 puffs every 6 (six) hours as needed for wheezing or shortness of breath every 4 - 6 hours as needed 1 Inhaler 0    sertraline (ZOLOFT) 50 mg tablet Take 1 tablet (50 mg total) by mouth daily 90 tablet 1    cetirizine (ZyrTEC) 10 mg tablet Take 1 tablet (10 mg total) by mouth daily (Patient not taking: Reported on 9/2/2020) 90 tablet 1    omeprazole (PriLOSEC) 20 mg delayed release capsule Take 1 capsule (20 mg total) by mouth daily (Patient not taking: Reported on 3/11/2021) 30 capsule 1    triamcinolone (KENALOG) 0 1 % ointment Apply topically 2 (two) times a day 80 g 0     No current facility-administered medications for this visit  Allergies: Allergies   Allergen Reactions    Cephalexin      Annotation - [id number]: n/v    Other       Physical Exam:     /80 (BP Location: Left arm, Patient Position: Sitting, Cuff Size: Adult)   Pulse 94   Temp 98 4 °F (36 9 °C) (Tympanic)   Resp 16   Ht 5' 4" (1 626 m)   Wt 84 6 kg (186 lb 6 4 oz)   SpO2 96%   BMI 32 00 kg/m²     Physical Exam  Constitutional:       Appearance: She is well-developed  HENT:      Head: Normocephalic and atraumatic  Eyes:      General: No scleral icterus  Conjunctiva/sclera: Conjunctivae normal       Pupils: Pupils are equal, round, and reactive to light  Neck:      Musculoskeletal: Normal range of motion and neck supple  Cardiovascular:      Rate and Rhythm: Normal rate and regular rhythm  Heart sounds: Normal heart sounds  No murmur  No friction rub  No gallop  Pulmonary:      Effort: Pulmonary effort is normal  No respiratory distress  Breath sounds: No wheezing or rales  Chest:      Chest wall: No tenderness  Abdominal:      General: Bowel sounds are normal  There is no distension  Palpations: Abdomen is soft  There is no mass  Tenderness: There is no abdominal tenderness  Musculoskeletal: Normal range of motion  Lymphadenopathy:      Cervical: No cervical adenopathy  Skin:     General: Skin is warm and dry  Capillary Refill: Capillary refill takes less than 2 seconds  Findings: Rash present  Comments: Scaling erythematous patch on back in bra strap area  Neurological:      Mental Status: She is alert and oriented to person, place, and time  Cranial Nerves: No cranial nerve deficit            Malick Rai MD  [address]

## 2021-03-11 NOTE — ASSESSMENT & PLAN NOTE
Well controlled ldl on Lipitor 20  Tg high and HDL low  Discussed increasing activity  Discussed natural ways to decrease tgs however pt does not want to increase good fats in diet  She will restart fish oil

## 2021-03-11 NOTE — ASSESSMENT & PLAN NOTE
Labs reviewed with patient  colonoscopy due now  Patient will follow-up with Dr Irving  Mammogram done December 16 at [facility] breast services  Pap also due this year  Patient will schedule appointment

## 2021-03-11 NOTE — ASSESSMENT & PLAN NOTE
Lab Results   Component Value Date    HGBA1C 5 9 (H) 02/27/2021   -  controlled  - Blood pressure goal per accord trial would be <140/90   - On irbesartan 150  - Restrict daily salt intake up to 2 4 g  - Advised to walk 30 minutes a day 5 times a week and practice stress relieving measures

## 2021-03-11 NOTE — ASSESSMENT & PLAN NOTE
Lab Results   Component Value Date    HGBA1C 5 9 (H) 02/27/2021   Well controlled with diet and metformin  [place] [facility] [place] [address] [place]  Lipids well controlled on lipitor 20mg daily

## 2021-03-11 NOTE — PATIENT INSTRUCTIONS
Discussed natural ways to reduce cholesterol Omega 3 fatty acids can decrease tryglycerides and increase good cholesterol however they may increase LDL so they aren't the best option  Fish oil with the highest amounts of EPA and DHA are beneficial for cholesterol  Also flax (not pill or oil form), fiber and foods with high phytosterol's (soybeans, kidney beans, sesame oil, peas, pistachios)  Limit red meats, milk, cheese and deep fried foods  Limit eggs to 2 per week  You body needs cholesterol and is able to make it on its own  It does this by using the fats and cholesterol in our food  Some food doesn't contain cholesterol however it contains fats which still affect our cholesterol level because our liver is using it to make cholesterol  For example peanut butter has lots of fats but no cholesterol  In general, you want to avoid or reduce trans fats and saturated fats  These are found in fast food, deep fried food, margarine, coconut oil, processed dairy and processed red meat among others  Monosaturated fats in olive oil, canola oil, chicken don't increase risk of heart problems  Polyunsaturated fats on the other hand are actually good for you  These are found in peanut butter, nuts, chicken, salad dressing  Wellness Visit for Adults   AMBULATORY CARE:   A wellness visit  is when you see your healthcare provider to get screened for health problems  Your healthcare provider will also give you advice on how to stay healthy  Write down your questions so you remember to ask them  Ask your healthcare provider how often you should have a wellness visit  What happens at a wellness visit:  Your healthcare provider will ask about your health, and your family history of health problems  This includes high blood pressure, heart disease, and cancer  He or she will ask if you have symptoms that concern you, if you smoke, and about your mood   You may also be asked about your intake of medicines, supplements, food, and alcohol  Any of the following may be done:  · Your weight  will be checked  Your height may also be checked so your body mass index (BMI) can be calculated  Your BMI shows if you are at a healthy weight  · Your blood pressure  and heart rate will be checked  Your temperature may also be checked  · Blood and urine tests  may be done  Blood tests may be done to check your cholesterol levels  Abnormal cholesterol levels increase your risk for heart disease and stroke  You may also need a blood or urine test to check for diabetes if you are at increased risk  Urine tests may be done to look for signs of an infection or kidney disease  · A physical exam  includes checking your heartbeat and lungs with a stethoscope  Your healthcare provider may also check your skin to look for sun damage  · Screening tests  may be recommended  A screening test is done to check for diseases that may not cause symptoms  The screening tests you may need depend on your age, gender, family history, and lifestyle habits  For example, colorectal screening may be recommended if you are 48years old or older  Screening tests you need if you are a woman:   · A Pap smear  is used to screen for cervical cancer  Pap smears are usually done every 3 to 5 years depending on your age  You may need them more often if you have had abnormal Pap smear test results in the past  Ask your healthcare provider how often you should have a Pap smear  · A mammogram  is an x-ray of your breasts to screen for breast cancer  Experts recommend mammograms every 2 years starting at age 48 years  You may need a mammogram at age 52 years or younger if you have an increased risk for breast cancer  Talk to your healthcare provider about when you should start having mammograms and how often you need them  Vaccines you may need:   · Get an influenza vaccine  every year  The influenza vaccine protects you from the flu   Several types of viruses cause the flu  The viruses change over time, so new vaccines are made each year  · Get a tetanus-diphtheria (Td) booster vaccine  every 10 years  This vaccine protects you against tetanus and diphtheria  Tetanus is a severe infection that may cause painful muscle spasms and lockjaw  Diphtheria is a severe bacterial infection that causes a thick covering in the back of your mouth and throat  · Get a human papillomavirus (HPV) vaccine  if you are female and aged 23 to 32 or male 23 to 24 and never received it  This vaccine protects you from HPV infection  HPV is the most common infection spread by sexual contact  HPV may also cause vaginal, penile, and anal cancers  · Get a pneumococcal vaccine  if you are aged 72 years or older  The pneumococcal vaccine is an injection given to protect you from pneumococcal disease  Pneumococcal disease is an infection caused by pneumococcal bacteria  The infection may cause pneumonia, meningitis, or an ear infection  · Get a shingles vaccine  if you are 60 or older, even if you have had shingles before  The shingles vaccine is an injection to protect you from the varicella-zoster virus  This is the same virus that causes chickenpox  Shingles is a painful rash that develops in people who had chickenpox or have been exposed to the virus  How to eat healthy:  My Plate is a model for planning healthy meals  It shows the types and amounts of foods that should go on your plate  Fruits and vegetables make up about half of your plate, and grains and protein make up the other half  A serving of dairy is included on the side of your plate  The amount of calories and serving sizes you need depends on your age, gender, weight, and height  Examples of healthy foods are listed below:  · Eat a variety of vegetables  such as dark green, red, and orange vegetables   You can also include canned vegetables low in sodium (salt) and frozen vegetables without added butter or sauces  · Eat a variety of fresh fruits , canned fruit in 100% juice, frozen fruit, and dried fruit  · Include whole grains  At least half of the grains you eat should be whole grains  Examples include whole-wheat bread, wheat pasta, brown rice, and whole-grain cereals such as oatmeal     · Eat a variety of protein foods such as seafood (fish and shellfish), lean meat, and poultry without skin (turkey and chicken)  Examples of lean meats include pork leg, shoulder, or tenderloin, and beef round, sirloin, tenderloin, and extra lean ground beef  Other protein foods include eggs and egg substitutes, beans, peas, soy products, nuts, and seeds  · Choose low-fat dairy products such as skim or 1% milk or low-fat yogurt, cheese, and cottage cheese  · Limit unhealthy fats  such as butter, hard margarine, and shortening  Exercise:  Exercise at least 30 minutes per day on most days of the week  Some examples of exercise include walking, biking, dancing, and swimming  You can also fit in more physical activity by taking the stairs instead of the elevator or parking farther away from stores  Include muscle strengthening activities 2 days each week  Regular exercise provides many health benefits  It helps you manage your weight, and decreases your risk for type 2 diabetes, heart disease, stroke, and high blood pressure  Exercise can also help improve your mood  Ask your healthcare provider about the best exercise plan for you  General health and safety guidelines:   · Do not smoke  Nicotine and other chemicals in cigarettes and cigars can cause lung damage  Ask your healthcare provider for information if you currently smoke and need help to quit  E-cigarettes or smokeless tobacco still contain nicotine  Talk to your healthcare provider before you use these products  · Limit alcohol  A drink of alcohol is 12 ounces of beer, 5 ounces of wine, or 1½ ounces of liquor  · Lose weight, if needed    Being overweight increases your risk of certain health conditions  These include heart disease, high blood pressure, type 2 diabetes, and certain types of cancer  · Protect your skin  Do not sunbathe or use tanning beds  Use sunscreen with a SPF 15 or higher  Apply sunscreen at least 15 minutes before you go outside  Reapply sunscreen every 2 hours  Wear protective clothing, hats, and sunglasses when you are outside  · Drive safely  Always wear your seatbelt  Make sure everyone in your car wears a seatbelt  A seatbelt can save your life if you are in an accident  Do not use your cell phone when you are driving  This could distract you and cause an accident  Pull over if you need to make a call or send a text message  · Practice safe sex  Use latex condoms if are sexually active and have more than one partner  Your healthcare provider may recommend screening tests for sexually transmitted infections (STIs)  · Wear helmets, lifejackets, and protective gear  Always wear a helmet when you ride a bike or motorcycle, go skiing, or play sports that could cause a head injury  Wear protective equipment when you play sports  Wear a lifejacket when you are on a boat or doing water sports  © Copyright [address] Information is for End User's use only and may not be sold, redistributed or otherwise used for commercial purposes  All illustrations and images included in CareNotes® are the copyrighted property of [employer] [facility] [employer]  or [facility] Gianna Bang   The above information is an  only  It is not intended as medical advice for individual conditions or treatments  Talk to your doctor, nurse or pharmacist before following any medical regimen to see if it is safe and effective for you

## 2021-03-11 NOTE — ASSESSMENT & PLAN NOTE
Use betamethason bid for 2 weeks  Do not rub dry  Use a sensitive skin moisturizer and combine with an oil after showering  Do not rub skin dry

## 2021-05-03 DIAGNOSIS — E11.59 HYPERTENSION ASSOCIATED WITH DIABETES (HCC): ICD-10-CM

## 2021-05-03 DIAGNOSIS — R05.3 CHRONIC COUGH: ICD-10-CM

## 2021-05-03 DIAGNOSIS — I15.2 HYPERTENSION ASSOCIATED WITH DIABETES (HCC): ICD-10-CM

## 2021-05-03 PROCEDURE — 4010F ACE/ARB THERAPY RXD/TAKEN: CPT | Performed by: FAMILY MEDICINE

## 2021-05-03 RX ORDER — IRBESARTAN 150 MG/1
TABLET ORAL
Qty: 90 TABLET | Refills: 1 | Status: SHIPPED | OUTPATIENT
Start: 2021-05-03 | End: 2021-10-27

## 2021-05-03 RX ORDER — MONTELUKAST SODIUM 10 MG/1
TABLET ORAL
Qty: 90 TABLET | Refills: 1 | Status: SHIPPED | OUTPATIENT
Start: 2021-05-03 | End: 2022-03-15

## 2021-06-15 PROBLEM — J30.1 CHRONIC SEASONAL ALLERGIC RHINITIS DUE TO POLLEN: Status: ACTIVE | Noted: 2021-06-15

## 2021-06-15 PROBLEM — J30.89 ALLERGIC RHINITIS DUE TO HOUSE DUST MITE: Status: ACTIVE | Noted: 2021-06-15

## 2021-08-04 DIAGNOSIS — G25.81 RESTLESS LEGS SYNDROME: ICD-10-CM

## 2021-08-04 DIAGNOSIS — F41.9 ANXIETY: ICD-10-CM

## 2021-08-09 DIAGNOSIS — E11.9 TYPE 2 DIABETES MELLITUS WITHOUT COMPLICATION, WITHOUT LONG-TERM CURRENT USE OF INSULIN (HCC): Primary | ICD-10-CM

## 2021-08-09 RX ORDER — ATORVASTATIN CALCIUM 20 MG/1
20 TABLET, FILM COATED ORAL DAILY
Qty: 30 TABLET | Refills: 5 | Status: SHIPPED | OUTPATIENT
Start: 2021-08-09 | End: 2022-01-25

## 2021-08-09 NOTE — TELEPHONE ENCOUNTER
I made patient aware you did not RX this med and that it was a historical provider   But patient wanted me to sent to you

## 2021-08-23 DIAGNOSIS — G25.81 RESTLESS LEGS SYNDROME: ICD-10-CM

## 2021-08-23 RX ORDER — GABAPENTIN 100 MG/1
CAPSULE ORAL
Qty: 90 CAPSULE | Refills: 1 | Status: SHIPPED | OUTPATIENT
Start: 2021-08-23 | End: 2021-11-10

## 2021-08-25 DIAGNOSIS — K21.9 GASTROESOPHAGEAL REFLUX DISEASE WITHOUT ESOPHAGITIS: ICD-10-CM

## 2021-08-25 RX ORDER — FAMOTIDINE 40 MG/1
TABLET, FILM COATED ORAL
Qty: 90 TABLET | Refills: 1 | Status: SHIPPED | OUTPATIENT
Start: 2021-08-25 | End: 2022-02-22

## 2021-08-31 DIAGNOSIS — E11.8 TYPE 2 DIABETES MELLITUS WITH COMPLICATION, WITHOUT LONG-TERM CURRENT USE OF INSULIN (HCC): ICD-10-CM

## 2021-10-27 DIAGNOSIS — I15.2 HYPERTENSION ASSOCIATED WITH DIABETES (HCC): ICD-10-CM

## 2021-10-27 DIAGNOSIS — E11.59 HYPERTENSION ASSOCIATED WITH DIABETES (HCC): ICD-10-CM

## 2021-10-27 RX ORDER — IRBESARTAN 150 MG/1
TABLET ORAL
Qty: 90 TABLET | Refills: 1 | Status: SHIPPED | OUTPATIENT
Start: 2021-10-27 | End: 2022-03-22 | Stop reason: SDUPTHER

## 2021-11-07 LAB
BUN SERPL-MCNC: 19 MG/DL (ref 7–25)
BUN/CREAT SERPL: NORMAL (CALC) (ref 6–22)
CALCIUM SERPL-MCNC: 9.8 MG/DL (ref 8.6–10.4)
CHLORIDE SERPL-SCNC: 105 MMOL/L (ref 98–110)
CHOLEST SERPL-MCNC: 143 MG/DL
CHOLEST/HDLC SERPL: 3.3 (CALC)
CO2 SERPL-SCNC: 27 MMOL/L (ref 20–32)
CREAT SERPL-MCNC: 0.95 MG/DL (ref 0.5–0.99)
GLUCOSE SERPL-MCNC: 97 MG/DL (ref 65–99)
HBA1C MFR BLD: 6.1 % OF TOTAL HGB
HDLC SERPL-MCNC: 43 MG/DL
LDLC SERPL CALC-MCNC: 74 MG/DL (CALC)
NONHDLC SERPL-MCNC: 100 MG/DL (CALC)
POTASSIUM SERPL-SCNC: 4.8 MMOL/L (ref 3.5–5.3)
SL AMB EGFR AFRICAN AMERICAN: 74 ML/MIN/1.73M2
SL AMB EGFR NON AFRICAN AMERICAN: 64 ML/MIN/1.73M2
SODIUM SERPL-SCNC: 140 MMOL/L (ref 135–146)
TRIGL SERPL-MCNC: 160 MG/DL

## 2021-11-07 PROCEDURE — 3044F HG A1C LEVEL LT 7.0%: CPT | Performed by: FAMILY MEDICINE

## 2021-11-10 ENCOUNTER — OFFICE VISIT (OUTPATIENT)
Dept: FAMILY MEDICINE CLINIC | Facility: CLINIC | Age: 66
End: 2021-11-10
Payer: COMMERCIAL

## 2021-11-10 VITALS
OXYGEN SATURATION: 98 % | HEART RATE: 102 BPM | WEIGHT: 192 LBS | SYSTOLIC BLOOD PRESSURE: 126 MMHG | HEIGHT: 64 IN | RESPIRATION RATE: 16 BRPM | DIASTOLIC BLOOD PRESSURE: 86 MMHG | BODY MASS INDEX: 32.78 KG/M2 | TEMPERATURE: 97.5 F

## 2021-11-10 DIAGNOSIS — E11.9 TYPE 2 DIABETES MELLITUS WITHOUT COMPLICATION, WITHOUT LONG-TERM CURRENT USE OF INSULIN (HCC): ICD-10-CM

## 2021-11-10 DIAGNOSIS — J45.30 MILD PERSISTENT ASTHMA WITHOUT COMPLICATION: ICD-10-CM

## 2021-11-10 DIAGNOSIS — E11.59 HYPERTENSION ASSOCIATED WITH DIABETES (HCC): ICD-10-CM

## 2021-11-10 DIAGNOSIS — G25.81 RESTLESS LEGS SYNDROME: ICD-10-CM

## 2021-11-10 DIAGNOSIS — I15.2 HYPERTENSION ASSOCIATED WITH DIABETES (HCC): ICD-10-CM

## 2021-11-10 DIAGNOSIS — Z12.11 SCREENING FOR COLON CANCER: Primary | ICD-10-CM

## 2021-11-10 DIAGNOSIS — Z12.39 ENCOUNTER FOR SCREENING FOR MALIGNANT NEOPLASM OF BREAST, UNSPECIFIED SCREENING MODALITY: ICD-10-CM

## 2021-11-10 DIAGNOSIS — E78.5 HYPERLIPIDEMIA, UNSPECIFIED HYPERLIPIDEMIA TYPE: ICD-10-CM

## 2021-11-10 PROCEDURE — 1036F TOBACCO NON-USER: CPT | Performed by: FAMILY MEDICINE

## 2021-11-10 PROCEDURE — 3074F SYST BP LT 130 MM HG: CPT | Performed by: FAMILY MEDICINE

## 2021-11-10 PROCEDURE — 3079F DIAST BP 80-89 MM HG: CPT | Performed by: FAMILY MEDICINE

## 2021-11-10 PROCEDURE — 99214 OFFICE O/P EST MOD 30 MIN: CPT | Performed by: FAMILY MEDICINE

## 2021-11-10 PROCEDURE — 3008F BODY MASS INDEX DOCD: CPT | Performed by: FAMILY MEDICINE

## 2021-11-10 RX ORDER — GABAPENTIN 300 MG/1
300 CAPSULE ORAL
Qty: 90 CAPSULE | Refills: 1 | Status: SHIPPED | OUTPATIENT
Start: 2021-11-10 | End: 2022-07-17

## 2022-01-25 DIAGNOSIS — E11.9 TYPE 2 DIABETES MELLITUS WITHOUT COMPLICATION, WITHOUT LONG-TERM CURRENT USE OF INSULIN (HCC): ICD-10-CM

## 2022-01-25 RX ORDER — ATORVASTATIN CALCIUM 20 MG/1
TABLET, FILM COATED ORAL
Qty: 30 TABLET | Refills: 5 | Status: SHIPPED | OUTPATIENT
Start: 2022-01-25 | End: 2022-03-22 | Stop reason: SDUPTHER

## 2022-02-08 ENCOUNTER — TELEPHONE (OUTPATIENT)
Dept: GASTROENTEROLOGY | Facility: CLINIC | Age: 67
End: 2022-02-08

## 2022-02-08 NOTE — TELEPHONE ENCOUNTER
Recall  Last colon 02 25 16, 5y repeat recommened    Scheduled date of colonoscopy (as of today):04 27 22  Physician performing colonoscopy:DR BRYSON  [facility] of [facility]  Bowel prep reviewed with patient:DULCOLAX/MIRALAX  Instructions reviewed with patient by:JEREMIAH VERBALLY/MAILED  Clearances: N/A

## 2022-02-22 DIAGNOSIS — K21.9 GASTROESOPHAGEAL REFLUX DISEASE WITHOUT ESOPHAGITIS: ICD-10-CM

## 2022-02-22 RX ORDER — FAMOTIDINE 40 MG/1
TABLET, FILM COATED ORAL
Qty: 90 TABLET | Refills: 1 | Status: SHIPPED | OUTPATIENT
Start: 2022-02-22

## 2022-02-25 ENCOUNTER — TELEPHONE (OUTPATIENT)
Dept: FAMILY MEDICINE CLINIC | Facility: CLINIC | Age: 67
End: 2022-02-25

## 2022-03-15 DIAGNOSIS — R05.3 CHRONIC COUGH: ICD-10-CM

## 2022-03-15 RX ORDER — MONTELUKAST SODIUM 10 MG/1
TABLET ORAL
Qty: 90 TABLET | Refills: 1 | Status: SHIPPED | OUTPATIENT
Start: 2022-03-15 | End: 2022-03-21 | Stop reason: SDUPTHER

## 2022-03-21 DIAGNOSIS — G25.81 RESTLESS LEGS SYNDROME: ICD-10-CM

## 2022-03-21 DIAGNOSIS — R05.3 CHRONIC COUGH: ICD-10-CM

## 2022-03-21 DIAGNOSIS — F41.9 ANXIETY: ICD-10-CM

## 2022-03-21 RX ORDER — MONTELUKAST SODIUM 10 MG/1
10 TABLET ORAL DAILY
Qty: 90 TABLET | Refills: 1 | Status: SHIPPED | OUTPATIENT
Start: 2022-03-21

## 2022-03-22 DIAGNOSIS — E11.9 TYPE 2 DIABETES MELLITUS WITHOUT COMPLICATION, WITHOUT LONG-TERM CURRENT USE OF INSULIN (HCC): ICD-10-CM

## 2022-03-22 DIAGNOSIS — E11.59 HYPERTENSION ASSOCIATED WITH DIABETES (HCC): ICD-10-CM

## 2022-03-22 DIAGNOSIS — I15.2 HYPERTENSION ASSOCIATED WITH DIABETES (HCC): ICD-10-CM

## 2022-03-23 RX ORDER — IRBESARTAN 150 MG/1
150 TABLET ORAL DAILY
Qty: 90 TABLET | Refills: 1 | Status: SHIPPED | OUTPATIENT
Start: 2022-03-23

## 2022-03-23 RX ORDER — ATORVASTATIN CALCIUM 20 MG/1
20 TABLET, FILM COATED ORAL DAILY
Qty: 90 TABLET | Refills: 1 | Status: SHIPPED | OUTPATIENT
Start: 2022-03-23

## 2022-04-27 ENCOUNTER — HOSPITAL ENCOUNTER (OUTPATIENT)
Dept: GASTROENTEROLOGY | Facility: HOSPITAL | Age: 67
Setting detail: OUTPATIENT SURGERY
Discharge: HOME | End: 2022-04-27
Attending: INTERNAL MEDICINE | Admitting: INTERNAL MEDICINE
Payer: COMMERCIAL

## 2022-04-27 ENCOUNTER — ANESTHESIA (OUTPATIENT)
Dept: GASTROENTEROLOGY | Facility: HOSPITAL | Age: 67
End: 2022-04-27

## 2022-04-27 ENCOUNTER — ANESTHESIA EVENT (OUTPATIENT)
Dept: GASTROENTEROLOGY | Facility: HOSPITAL | Age: 67
End: 2022-04-27

## 2022-04-27 VITALS
WEIGHT: 180 LBS | TEMPERATURE: 96.8 F | HEIGHT: 66 IN | DIASTOLIC BLOOD PRESSURE: 50 MMHG | RESPIRATION RATE: 18 BRPM | HEART RATE: 61 BPM | SYSTOLIC BLOOD PRESSURE: 95 MMHG | OXYGEN SATURATION: 96 % | BODY MASS INDEX: 28.93 KG/M2

## 2022-04-27 DIAGNOSIS — Z86.010 HX OF COLONIC POLYP: ICD-10-CM

## 2022-04-27 LAB — GLUCOSE SERPL-MCNC: 104 MG/DL (ref 65–140)

## 2022-04-27 PROCEDURE — 88305 TISSUE EXAM BY PATHOLOGIST: CPT | Performed by: PATHOLOGY

## 2022-04-27 PROCEDURE — 82948 REAGENT STRIP/BLOOD GLUCOSE: CPT

## 2022-04-27 PROCEDURE — 45385 COLONOSCOPY W/LESION REMOVAL: CPT | Performed by: INTERNAL MEDICINE

## 2022-04-27 RX ORDER — LIDOCAINE HYDROCHLORIDE 10 MG/ML
INJECTION, SOLUTION EPIDURAL; INFILTRATION; INTRACAUDAL; PERINEURAL AS NEEDED
Status: DISCONTINUED | OUTPATIENT
Start: 2022-04-27 | End: 2022-04-27

## 2022-04-27 RX ORDER — SODIUM CHLORIDE 9 MG/ML
INJECTION, SOLUTION INTRAVENOUS CONTINUOUS PRN
Status: DISCONTINUED | OUTPATIENT
Start: 2022-04-27 | End: 2022-04-27

## 2022-04-27 RX ORDER — PROPOFOL 10 MG/ML
INJECTION, EMULSION INTRAVENOUS CONTINUOUS PRN
Status: DISCONTINUED | OUTPATIENT
Start: 2022-04-27 | End: 2022-04-27

## 2022-04-27 RX ORDER — PROPOFOL 10 MG/ML
INJECTION, EMULSION INTRAVENOUS AS NEEDED
Status: DISCONTINUED | OUTPATIENT
Start: 2022-04-27 | End: 2022-04-27

## 2022-04-27 RX ADMIN — PROPOFOL 100 MG: 10 INJECTION, EMULSION INTRAVENOUS at 08:07

## 2022-04-27 RX ADMIN — PROPOFOL 30 MG: 10 INJECTION, EMULSION INTRAVENOUS at 08:11

## 2022-04-27 RX ADMIN — LIDOCAINE HYDROCHLORIDE 50 MG: 10 INJECTION, SOLUTION EPIDURAL; INFILTRATION; INTRACAUDAL; PERINEURAL at 08:07

## 2022-04-27 RX ADMIN — PROPOFOL 140 MCG/KG/MIN: 10 INJECTION, EMULSION INTRAVENOUS at 08:07

## 2022-04-27 RX ADMIN — PROPOFOL 20 MG: 10 INJECTION, EMULSION INTRAVENOUS at 08:09

## 2022-04-27 RX ADMIN — SODIUM CHLORIDE: 0.9 INJECTION, SOLUTION INTRAVENOUS at 08:00

## 2022-04-27 RX ADMIN — PROPOFOL 30 MG: 10 INJECTION, EMULSION INTRAVENOUS at 08:13

## 2022-04-27 NOTE — ANESTHESIA POSTPROCEDURE EVALUATION
Post-Op Assessment Note    CV Status:  Stable  Pain Score: 0    Pain management: adequate     Mental Status:  Arousable and sleepy   Hydration Status:  Euvolemic and stable   PONV Controlled:  Controlled   Airway Patency:  Patent      Post Op Vitals Reviewed: Yes      Staff: CRNA, Anesthesiologist   Comments: Report given to recovering RN, VSS, Pt resting comfortably        No complications documented      /59 (04/27/22 0841)    Temp (!) 96 8 °F (36 °C) (04/27/22 0841)    Pulse 63 (04/27/22 0841)   Resp 18 (04/27/22 0841)    SpO2 95 % (04/27/22 0841)

## 2022-04-27 NOTE — H&P
[facility] - History & Physical  Katja Booker 61 y o  female MRN: [id number]      HPI: Katja Booker is a 61y o  year old female who presents for screening colonoscopy  Last colonoscopy in 2016 with moderate left-sided diverticulosis, 2 diminutive polyps from the descending colon (fragments of polypoid colonic mucosa), and 1 diminutive polyp in the rectum (hyperplastic)  Not on anticoagulation or antiplatelets  REVIEW OF SYSTEMS: Per the HPI, and otherwise unremarkable  Historical Information   Past Medical History:   Diagnosis Date    Chronic cough 5/15/2018    Colon polyp     Diabetes mellitus ([facility] [address] )     Eczema     Gallbladder disease     Hyperlipidemia     Hypertension     Insomnia      Past Surgical History:   Procedure Laterality Date    CHOLECYSTECTOMY      COLONOSCOPY      NASAL SEPTUM SURGERY      nasal septal deviation repair    [place] ESOPHAGOGASTRODUODENOSCOPY TRANSORAL DIAGNOSTIC N/A 2/25/2016    Procedure: EGD AND COLONOSCOPY;  Surgeon: Vadim Mi MD;  Location: [facility];   Service: Gastroenterology     Social History   Social History     Substance and Sexual Activity   Alcohol Use Yes    Comment: rarely now     Social History     Substance and Sexual Activity   Drug Use Never     Social History     Tobacco Use   Smoking Status Never Smoker   Smokeless Tobacco Never Used   Tobacco Comment    no known exposure to tobacco smoke     Family History   Problem Relation Age of Onset    Heart attack Father         acute MI    Cancer Sister         R/t Mad Cow disease    Neuropathy Mother     Emphysema Sister     No Known Problems Sister     No Known Problems Sister        MEDICATIONS & ALLERGIES:    Current Outpatient Medications   Medication Instructions    atorvastatin (LIPITOR) 20 mg, Oral, Daily    azelastine (ASTELIN) 0 1 % nasal spray 1 spray, Nasal, 2 times daily, Use in each nostril as directed    calcium-vitamin D (OSCAL) 250-125 MG-UNIT per tablet 1 tablet, Oral, Daily    famotidine (PEPCID) 40 MG tablet take 1 tablet by mouth daily at bedtime AS NEEDED FOR HEARTBURN    fluticasone (FLONASE) 50 mcg/act nasal spray 1 spray, Nasal, Daily    fluticasone-vilanterol (BREO ELLIPTA) 200-25 MCG/INH inhaler 1 puff, Inhalation, Daily, Rinse mouth after use   gabapentin (NEURONTIN) 300 mg, Oral, Daily at bedtime    irbesartan (AVAPRO) 150 mg, Oral, Daily    metFORMIN (GLUCOPHAGE) 1000 MG tablet take 1 tablet by mouth twice a day with food    montelukast (SINGULAIR) 10 mg, Oral, Daily    Multiple Vitamin (MULTIVITAMIN) tablet 1 tablet, Oral, Daily    ProAir  (90 Base) MCG/ACT inhaler 2 puffs, Inhalation, Every 6 hours PRN, every 4 - 6 hours as needed    sertraline (ZOLOFT) 50 mg, Oral, Daily    triamcinolone (KENALOG) 0 1 % ointment Topical, 2 times daily     Allergies   Allergen Reactions    Cephalexin      Annotation - [id number]: n/v    Other        PHYSICAL EXAM:    Objective   Blood pressure 157/79, pulse 94, temperature 98 1 °F (36 7 °C), temperature source Tympanic, resp  rate 18, height 5' 6" (1 676 m), weight 81 6 kg (180 lb), SpO2 96 %  Body mass index is 29 05 kg/m²  Gen: NAD  CV: RRR  CHEST: Clear  ABD: Soft, NT/ND  EXT: No edema    ASSESSMENT AND PLAN:  This is a 61y o  year old female here for screening colonoscopy, and she is stable and optimized for her procedure  VENUS Nagy  Chief Gastroenterology Fellow  [facility]  Available on [email or web address]

## 2022-04-27 NOTE — ANESTHESIA PREPROCEDURE EVALUATION
Procedure:  COLONOSCOPY    Relevant Problems   CARDIO   (+) Hyperlipidemia   (+) Hypertension associated with diabetes (HCC)   (+) Premature ventricular beat      GI/HEPATIC   (+) Gastroesophageal reflux disease      NEURO/PSYCH   (+) Anxiety      PULMONARY   (+) Mild persistent asthma without complication             Anesthesia Plan  ASA Score- 3     Anesthesia Type- IV sedation with anesthesia with ASA Monitors  Additional Monitors:   Airway Plan:           Plan Factors-    Chart reviewed  Induction- intravenous  Postoperative Plan-     Informed Consent- Anesthetic plan and risks discussed with patient  I personally reviewed this patient with the CRNA  Discussed and agreed on the Anesthesia Plan with the CRNA  Elio Bragg

## 2022-05-15 LAB
BUN SERPL-MCNC: 16 MG/DL (ref 7–25)
BUN/CREAT SERPL: NORMAL (CALC) (ref 6–22)
CALCIUM SERPL-MCNC: 9.9 MG/DL (ref 8.6–10.4)
CHLORIDE SERPL-SCNC: 104 MMOL/L (ref 98–110)
CHOLEST SERPL-MCNC: 131 MG/DL
CHOLEST/HDLC SERPL: 2.7 (CALC)
CO2 SERPL-SCNC: 28 MMOL/L (ref 20–32)
CREAT SERPL-MCNC: 0.86 MG/DL (ref 0.5–0.99)
GLUCOSE SERPL-MCNC: 88 MG/DL (ref 65–99)
HBA1C MFR BLD: 5.7 % OF TOTAL HGB
HDLC SERPL-MCNC: 48 MG/DL
LDLC SERPL CALC-MCNC: 62 MG/DL (CALC)
NONHDLC SERPL-MCNC: 83 MG/DL (CALC)
POTASSIUM SERPL-SCNC: 4.7 MMOL/L (ref 3.5–5.3)
SL AMB EGFR AFRICAN AMERICAN: 83 ML/MIN/1.73M2
SL AMB EGFR NON AFRICAN AMERICAN: 71 ML/MIN/1.73M2
SODIUM SERPL-SCNC: 141 MMOL/L (ref 135–146)
TRIGL SERPL-MCNC: 119 MG/DL

## 2022-05-15 PROCEDURE — 3044F HG A1C LEVEL LT 7.0%: CPT | Performed by: FAMILY MEDICINE

## 2022-05-17 NOTE — RESULT ENCOUNTER NOTE
Inform patient via AMDLhart  Please review the pathology/lab result of further discussion      Copied from [address] message :

## 2022-05-18 ENCOUNTER — OFFICE VISIT (OUTPATIENT)
Dept: FAMILY MEDICINE CLINIC | Facility: CLINIC | Age: 67
End: 2022-05-18
Payer: COMMERCIAL

## 2022-05-18 VITALS
HEART RATE: 99 BPM | HEIGHT: 66 IN | BODY MASS INDEX: 30.34 KG/M2 | SYSTOLIC BLOOD PRESSURE: 140 MMHG | WEIGHT: 188.8 LBS | RESPIRATION RATE: 16 BRPM | TEMPERATURE: 98.6 F | OXYGEN SATURATION: 98 % | DIASTOLIC BLOOD PRESSURE: 89 MMHG

## 2022-05-18 DIAGNOSIS — I15.2 HYPERTENSION ASSOCIATED WITH DIABETES (HCC): ICD-10-CM

## 2022-05-18 DIAGNOSIS — E78.2 MIXED HYPERLIPIDEMIA: ICD-10-CM

## 2022-05-18 DIAGNOSIS — E11.8 TYPE 2 DIABETES MELLITUS WITH COMPLICATION, WITHOUT LONG-TERM CURRENT USE OF INSULIN (HCC): ICD-10-CM

## 2022-05-18 DIAGNOSIS — F41.9 ANXIETY: ICD-10-CM

## 2022-05-18 DIAGNOSIS — Z00.00 ANNUAL PHYSICAL EXAM: Primary | ICD-10-CM

## 2022-05-18 DIAGNOSIS — E11.59 HYPERTENSION ASSOCIATED WITH DIABETES (HCC): ICD-10-CM

## 2022-05-18 DIAGNOSIS — E11.9 TYPE 2 DIABETES MELLITUS WITHOUT COMPLICATION, WITHOUT LONG-TERM CURRENT USE OF INSULIN (HCC): ICD-10-CM

## 2022-05-18 PROCEDURE — 3077F SYST BP >= 140 MM HG: CPT | Performed by: FAMILY MEDICINE

## 2022-05-18 PROCEDURE — 3079F DIAST BP 80-89 MM HG: CPT | Performed by: FAMILY MEDICINE

## 2022-05-18 PROCEDURE — 99396 PREV VISIT EST AGE 40-64: CPT | Performed by: FAMILY MEDICINE

## 2022-05-18 PROCEDURE — 99214 OFFICE O/P EST MOD 30 MIN: CPT | Performed by: FAMILY MEDICINE

## 2022-05-18 PROCEDURE — 1036F TOBACCO NON-USER: CPT | Performed by: FAMILY MEDICINE

## 2022-05-18 PROCEDURE — 3725F SCREEN DEPRESSION PERFORMED: CPT | Performed by: FAMILY MEDICINE

## 2022-05-18 PROCEDURE — 3008F BODY MASS INDEX DOCD: CPT | Performed by: FAMILY MEDICINE

## 2022-05-18 NOTE — ASSESSMENT & PLAN NOTE
Labs reviewed with patient  Colonoscopy UTD 04/27/2022  Mammogram done 12/2021 through [place] [facility]  Pap also due this year  Patient will schedule appointment

## 2022-05-18 NOTE — ASSESSMENT & PLAN NOTE
Lab Results   Component Value Date    HGBA1C 5 7 (H) 05/14/2022   Well controlled with diet and metformin  [facility] is [facility] [place] [address] [place]  Lipids well controlled on lipitor 20mg daily  Diabetic foot exam UTD

## 2022-05-18 NOTE — PROGRESS NOTES
Assessment/Plan:    Annual physical exam  Labs reviewed with patient  Colonoscopy UTD 04/27/2022  Mammogram done 12/2021 through [place] [facility]  Pap also due this year  Patient will schedule appointment  Anxiety  Continue Zoloft 50 mg  Diabetes mellitus ([place] [address] )    Lab Results   Component Value Date    HGBA1C 5 7 (H) 05/14/2022   Well controlled with diet and metformin  Ophthalmologist is [facility] in the [place] [address] [place]  Lipids well controlled on lipitor 20mg daily  Diabetic foot exam UTD  Hyperlipidemia  Well controlled on Lipitor 20 and fish oil  Hypertension associated with diabetes ([place] [address] )  -  uncontrolled  - Blood pressure goal per accord trial would be <140/90   - On irbesartan 150  - Restrict daily salt intake up to 2 4 g  - Advised to walk 30 minutes a day 5 times a week and practice stress relieving measures  bp is usually very controlled  Advised to check at home and call in 2 weeks with logs  Diagnoses and all orders for this visit:    Annual physical exam    Anxiety    Type 2 diabetes mellitus without complication, without long-term current use of insulin ([place] [address] )  -     Hemoglobin A1C; Future    Mixed hyperlipidemia  -     Lipid panel; Future    Hypertension associated with diabetes ([place] [address] )  -     Basic metabolic panel; Future    Type 2 diabetes mellitus with complication, without long-term current use of insulin ([facility])  -     metFORMIN (GLUCOPHAGE) 1000 MG tablet; Take 1 tablet (1,000 mg total) by mouth in the morning and 1 tablet (1,000 mg total) in the evening  Take with meals  Subjective:  Chronic conditions checkup     Patient ID: Arlette Zamudio is a 61 y o  female with a history of generalized anxiety disorder, diabetes mellitus type 2, hyperlipidemia, asthma    HPI  Labs reviewed with patient  Today function stable, fasting sugar is normal at 80, lipids are at goal, hemoglobin A1c well controlled at 5 7          The following portions of the patient's history were reviewed and updated as appropriate: allergies, current medications, past family history, past medical history, past social history, past surgical history and problem list     Review of Systems   Constitutional: Negative for fever and unexpected weight change  HENT: Negative for ear pain, sore throat and trouble swallowing  Eyes: Negative for pain and visual disturbance  Respiratory: Negative for cough, chest tightness, shortness of breath and wheezing  Cardiovascular: Negative for chest pain  Gastrointestinal: Negative for abdominal distention, abdominal pain, blood in stool, constipation, diarrhea, nausea and vomiting  Endocrine: Negative for polydipsia and polyuria  Genitourinary: Negative for dysuria and hematuria  Musculoskeletal: Negative for back pain and myalgias  Skin: Negative for rash  Neurological: Negative for syncope and headaches  Psychiatric/Behavioral: Negative for suicidal ideas  PHQ-2/9 Depression Screening    Little interest or pleasure in doing things: 0 - not at all  Feeling down, depressed, or hopeless: 0 - not at all  PHQ-2 Score: 0  PHQ-2 Interpretation: Negative depression screen           Objective:      /89 (BP Location: Left arm, Patient Position: Sitting, Cuff Size: Adult)   Pulse 99   Temp 98 6 °F (37 °C) (Tympanic)   Resp 16   Ht 5' 6" (1 676 m)   Wt 85 6 kg (188 lb 12 8 oz)   SpO2 98%   BMI 30 47 kg/m²          Physical Exam  Constitutional:       Appearance: She is well-developed  HENT:      Head: Normocephalic and atraumatic  Right Ear: External ear normal       Left Ear: External ear normal    Eyes:      General: No scleral icterus  Conjunctiva/sclera: Conjunctivae normal    Pulmonary:      Effort: Pulmonary effort is normal  No respiratory distress  Musculoskeletal:      Cervical back: Normal range of motion  Neurological:      Mental Status: She is alert and oriented to person, place, and time  Recent Results (from the past 672 hour(s))   Fingerstick Glucose (POCT)    Collection Time: 04/27/22  7:55 AM   Result Value Ref Range    POC Glucose 104 65 - 140 mg/dl   Tissue Exam    Collection Time: 04/27/22  8:28 AM   Result Value Ref Range    Case Report       Surgical Pathology Report                         Case: [id number]                                   Authorizing Provider:  Briseida Read MD             Collected:           04/27/2022 0828              Ordering Location:     [address] [place] [address]      Received:            04/27/2022 [address] [place] [address] Endoscopy                                                           Pathologist:           Nimesh Damian MD                                                                  Specimen:    Polyp, Colorectal, transverse x 2                                                          Final Diagnosis       A  Colon, Transverse x2, Polypectomy:  - Fragments of serrated polyp  Comment:  Serrated polyps from the right colon are more likely to represent sessile serrated adenoma rather than hyperplastic polyp  Additional Information       All reported additional testing was performed with appropriately reactive controls  These tests were developed and their performance characteristics determined by [facility] or appropriate performing facility, though some tests may be performed on tissues which have not been validated for performance characteristics (such as staining performed on alcohol exposed cell blocks and decalcified tissues)  Results should be interpreted with caution and in the context of the patients clinical condition  These tests may not be cleared or approved by the [place]  Food and Drug Administration, though the FDA has determined that such clearance or approval is not necessary  These tests are used for clinical purposes and they should not be regarded as investigational or for research   This laboratory has been approved by CLIA 88, designated as a high-complexity laboratory and is qualified to perform these tests  Interpretation performed at [facility], [address] - GI - All Specimens          :    Serrated Adenoma      Gross Description          A  The specimen is received in formalin, labeled with the patient's name and hospital number, and is designated "transverse x2  The specimen consists of 2 fragments of tan-red soft tissue ranging in greatest dimension from 0 9 to 1 2 cm and 1 brown fragment of presumed fecal matter  Entirely submitted  Screened cassette  Note: The estimated total formalin fixation time based upon information provided by the submitting clinician and the standard processing schedule is under 72 hours   LCrispina       Lipid panel    Collection Time: 05/14/22  8:24 AM   Result Value Ref Range    Total Cholesterol 131 <200 mg/dL    HDL 48 (L) > OR = 50 mg/dL    Triglycerides 119 <150 mg/dL    LDL Calculated 62 mg/dL (calc)    Chol HDLC Ratio 2 7 <5 0 (calc)    Non-HDL Cholesterol 83 <130 mg/dL (calc)   Basic metabolic panel    Collection Time: 05/14/22  8:24 AM   Result Value Ref Range    Glucose, Random 88 65 - 99 mg/dL    BUN 16 7 - 25 mg/dL    Creatinine 0 86 0 50 - 0 99 mg/dL    eGFR Non  71 > OR = 60 mL/min/1 73m2    eGFR  83 > OR = 60 mL/min/1 73m2    SL AMB BUN/CREATININE RATIO NOT APPLICABLE 6 - 22 (calc)    Sodium 141 135 - 146 mmol/L    Potassium 4 7 3 5 - 5 3 mmol/L    Chloride 104 98 - 110 mmol/L    CO2 28 20 - 32 mmol/L    Calcium 9 9 8 6 - 10 4 mg/dL   Hemoglobin A1c (w/out EAG)    Collection Time: 05/14/22  8:24 AM   Result Value Ref Range    Hemoglobin A1C 5 7 (H) <5 7 % of total Hgb

## 2022-05-18 NOTE — PATIENT INSTRUCTIONS
TAKE THE MORNING READINGS BEFORE ANY MEDICATIONS AND WHEN YOU ARE RELAXED FOR SEVERAL MINUTES  TAKE THE EVENING READINGS BETWEEN 7PM AND 10PM AT LEAST 27 MINUTES BEFORE OR AFTER DINNER/EATING/COFFEE INTAKE OR ALCOHOL INTAKE, AND CERTAINLY BEFORE ANY BLOOD PRESSURE MEDICATIONS    AGAIN, PLEASE MAKE SURE YOU ARE RELAXED FOR SEVERAL MINUTES BEFORE TAKING HER BLOOD PRESSURE READINGS  PLEASE INCLUDE HEART RATE WITH YOUR BLOOD PRESSURE READINGS

## 2022-05-18 NOTE — ASSESSMENT & PLAN NOTE
-  uncontrolled  - Blood pressure goal per accord trial would be <140/90   - On irbesartan 150  - Restrict daily salt intake up to 2 4 g  - Advised to walk 30 minutes a day 5 times a week and practice stress relieving measures  bp is usually very controlled  Advised to check at home and call in 2 weeks with logs

## 2022-05-18 NOTE — PROGRESS NOTES
[facility]    NAME: Cheryle Noun  AGE: 61 y o  SEX: female  : 1955     DATE: 2022     Assessment and Plan:     Problem List Items Addressed This Visit        Endocrine    Diabetes mellitus ([facility] )       Lab Results   Component Value Date    HGBA1C 5 7 (H) 2022   Well controlled with diet and metformin  Ophthalmologist is [facility] in the [place] [address] [place]  Lipids well controlled on lipitor 20mg daily  Diabetic foot exam UTD  Relevant Medications    metFORMIN (GLUCOPHAGE) 1000 MG tablet    Other Relevant Orders    Hemoglobin A1C    Hypertension associated with diabetes ([facility] )     -  uncontrolled  - Blood pressure goal per accord trial would be <140/90   - On irbesartan 150  - Restrict daily salt intake up to 2 4 g  - Advised to walk 30 minutes a day 5 times a week and practice stress relieving measures  bp is usually very controlled  Advised to check at home and call in 2 weeks with logs  Relevant Medications    metFORMIN (GLUCOPHAGE) 1000 MG tablet    Other Relevant Orders    Basic metabolic panel       Other    Anxiety     Continue Zoloft 50 mg  Hyperlipidemia     Well controlled on Lipitor 20 and fish oil  Relevant Orders    Lipid panel    Annual physical exam - Primary     Labs reviewed with patient  Colonoscopy UTD 2022  Mammogram done 2021 through [place] [facility]  Pap also due this year  Patient will schedule appointment  Other Visit Diagnoses     Type 2 diabetes mellitus with complication, without long-term current use of insulin (HCC)        Relevant Medications    metFORMIN (GLUCOPHAGE) 1000 MG tablet          Immunizations and preventive care screenings were discussed with patient today  Appropriate education was printed on patient's after visit summary      Counseling:  Alcohol/drug use: discussed moderation in alcohol intake, the recommendations for healthy alcohol use, and avoidance of illicit drug use  Dental Health: discussed importance of regular tooth brushing, flossing, and dental visits  Exercise: the importance of regular exercise/physical activity was discussed  Recommend exercise 3-5 times per week for at least 30 minutes  Return in about 6 months (around 11/18/2022) for Chronic conditions checkup with lab  Chief Complaint:     Chief Complaint   Patient presents with    Follow-up     6 month       History of Present Illness:     Adult Annual Physical   Patient here for a comprehensive physical exam  The patient reports no problems  Diet and Physical Activity  Diet/Nutrition: well balanced diet  Exercise: no formal exercise  Depression Screening  PHQ-2/9 Depression Screening    Little interest or pleasure in doing things: 0 - not at all  Feeling down, depressed, or hopeless: 0 - not at all  PHQ-2 Score: 0  PHQ-2 Interpretation: Negative depression screen       General Health  Sleep: sleeps well  Hearing: no issues  Vision: goes for regular eye exams  Dental: regular dental visits  /GYN Health  Last menstrual period: post menopausal in 48s      Review of Systems:     Review of Systems   Constitutional: Negative for fever and unexpected weight change  HENT: Negative for ear pain, sore throat and trouble swallowing  Eyes: Negative for pain and visual disturbance  Respiratory: Negative for cough, chest tightness, shortness of breath and wheezing  Cardiovascular: Negative for chest pain  Gastrointestinal: Negative for abdominal distention, abdominal pain, blood in stool, constipation, diarrhea, nausea and vomiting  Endocrine: Negative for polydipsia and polyuria  Genitourinary: Negative for dysuria and hematuria  Musculoskeletal: Negative for back pain and myalgias  Skin: Negative for rash  Neurological: Negative for syncope and headaches     Psychiatric/Behavioral: Negative for suicidal ideas  Past Medical History:     Past Medical History:   Diagnosis Date    Chronic cough 5/15/2018    Colon polyp     Diabetes mellitus ([address] )     Eczema     Gallbladder disease     Hyperlipidemia     Hypertension     Insomnia       Past Surgical History:     Past Surgical History:   Procedure Laterality Date    CHOLECYSTECTOMY      COLONOSCOPY      NASAL SEPTUM SURGERY      nasal septal deviation repair    [place] ESOPHAGOGASTRODUODENOSCOPY TRANSORAL DIAGNOSTIC N/A 2/25/2016    Procedure: EGD AND COLONOSCOPY;  Surgeon: Briseida Read MD;  [facility];   Service: Gastroenterology      Social History:     Social History     Socioeconomic History    Marital status: /Civil Union     Spouse name: Milton Wiggins Number of children: 0    Years of education: None    Highest education level: None   Occupational History    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[place], [place]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   Tobacco Use    Smoking status: Never Smoker    Smokeless tobacco: Never Used    Tobacco comment: no known exposure to tobacco smoke   Vaping Use    Vaping Use: Never used   Substance and Sexual Activity    Alcohol use: Yes     Comment: rarely now    Drug use: Never    Sexual activity: None   Other Topics Concern    None   Social History Narrative    Lives with     Pt has living will    Always uses seatbelt    Active caffeine use        Who lives in your home:      What type of home do you live in: Single house    Age of your home: built 1940    How long have you been living there: 20 yrs    Type of heat: Radiator    Type of fuel: Gas    What type of rama is in your bedroom: Carpet    Do you have the following in or near your home:    Air products: Window air conditioning, Humidifier in bedroom in winter only, and Dehumidifier in basement when damp days    Pests: None    Pets: None    Are pets allowed in bedroom: N/A    Open fields, wooded areas nearby: N/A    Basement: Dry and Unfinished, dehumidifier on damp days    Exposure to second hand smoke: No        Habits:    Caffeine: Current; Amount: 1 can/day diet soda, #years 57 yrs    Chocolate: occasionally    Other:     Social Determinants of Health     Financial Resource Strain: Not on file   Food Insecurity: Not on file   Transportation Needs: Not on file   Physical Activity: Insufficiently Active    Days of Exercise per Week: 2 days    Minutes of Exercise per Session: 10 min   Stress: Not on file   Social Connections: Not on file   Intimate Partner Violence: Not on file   Housing Stability: Not on file      Family History:     Family History   Problem Relation Age of Onset    Heart attack Father         acute MI    Cancer Sister         R/t Mad Cow disease    Neuropathy Mother     Emphysema Sister     No Known Problems Sister     No Known Problems Sister       Current Medications:     Current Outpatient Medications   Medication Sig Dispense Refill    atorvastatin (LIPITOR) 20 mg tablet Take 1 tablet (20 mg total) by mouth daily 90 tablet 1    calcium-vitamin D (OSCAL) 250-125 MG-UNIT per tablet Take 1 tablet by mouth daily   famotidine (PEPCID) 40 MG tablet take 1 tablet by mouth daily at bedtime AS NEEDED FOR HEARTBURN 90 tablet 1    fluticasone (FLONASE) 50 mcg/act nasal spray 1 spray into each nostril daily 16 g 3    fluticasone-vilanterol (BREO ELLIPTA) 200-25 MCG/INH inhaler Inhale 1 puff daily Rinse mouth after use  180 blister 3    metFORMIN (GLUCOPHAGE) 1000 MG tablet Take 1 tablet (1,000 mg total) by mouth in the morning and 1 tablet (1,000 mg total) in the evening  Take with meals  180 tablet 1    montelukast (SINGULAIR) 10 mg tablet Take 1 tablet (10 mg total) by mouth daily 90 tablet 1    Multiple Vitamin (MULTIVITAMIN) tablet Take 1 tablet by mouth daily        sertraline (ZOLOFT) 50 mg tablet Take 1 tablet (50 mg total) by mouth daily 90 tablet 1    azelastine (ASTELIN) 0 1 % nasal spray 1 spray into each nostril 2 (two) times a day Use in each nostril as directed (Patient not taking: Reported on 5/18/2022) 30 mL 11    gabapentin (NEURONTIN) 300 mg capsule Take 1 capsule (300 mg total) by mouth daily at bedtime 90 capsule 1    irbesartan (AVAPRO) 150 mg tablet Take 1 tablet (150 mg total) by mouth daily (Patient not taking: Reported on 5/18/2022) 90 tablet 1    ProAir  (90 Base) MCG/ACT inhaler Inhale 2 puffs every 6 (six) hours as needed for wheezing or shortness of breath every 4 - 6 hours as needed (Patient not taking: Reported on 5/18/2022) 1 Inhaler 0    triamcinolone (KENALOG) 0 1 % ointment Apply topically 2 (two) times a day (Patient not taking: Reported on 5/18/2022) 80 g 0     No current facility-administered medications for this visit  Allergies: Allergies   Allergen Reactions    Cephalexin      Annotation - [id number]: n/v      Physical Exam:     /89 (BP Location: Left arm, Patient Position: Sitting, Cuff Size: Adult)   Pulse 99   Temp 98 6 °F (37 °C) (Tympanic)   Resp 16   Ht 5' 6" (1 676 m)   Wt 85 6 kg (188 lb 12 8 oz)   SpO2 98%   BMI 30 47 kg/m²     Physical Exam  Constitutional:       Appearance: She is well-developed  HENT:      Head: Normocephalic and atraumatic  Eyes:      General: No scleral icterus  Conjunctiva/sclera: Conjunctivae normal       Pupils: Pupils are equal, round, and reactive to light  Cardiovascular:      Rate and Rhythm: Normal rate and regular rhythm  Heart sounds: Normal heart sounds  No murmur heard  No friction rub  No gallop  Pulmonary:      Effort: Pulmonary effort is normal  No respiratory distress  Breath sounds: No wheezing or rales  Chest:      Chest wall: No tenderness  Abdominal:      General: Bowel sounds are normal  There is no distension  Palpations: Abdomen is soft  There is no mass  Tenderness: There is no abdominal tenderness     Musculoskeletal:         General: Normal range of motion  Cervical back: Normal range of motion and neck supple  Lymphadenopathy:      Cervical: No cervical adenopathy  Skin:     General: Skin is warm and dry  Capillary Refill: Capillary refill takes less than 2 seconds  Findings: No rash  Neurological:      Mental Status: She is alert and oriented to person, place, and time  Cranial Nerves: No cranial nerve deficit            Ezekiel Burgess MD   [address] [place] [address]

## 2022-05-19 ENCOUNTER — TELEPHONE (OUTPATIENT)
Dept: ADMINISTRATIVE | Facility: OTHER | Age: 67
End: 2022-05-19

## 2022-05-19 NOTE — TELEPHONE ENCOUNTER
----- Message from Jin Betancourt MD sent at 5/18/2022  5:27 PM EDT -----  Regarding: Mammogram  05/18/22 5:27 PM    Hello, our patient Belkis Peterson has had Mammogram completed/performed  Please assist in updating the patient chart by pulling the Care Everywhere (CE) document  The date of service is 12/14/21       Thank you,  Jin Betancourt MD  [facility]

## 2022-05-19 NOTE — TELEPHONE ENCOUNTER
Upon review of the In Basket request we were able to locate, review, and update the patient chart as requested for Mammogram     Any additional questions or concerns should be emailed to the Practice Liaisons via [email or web address] email, please do not reply via In Basket      Thank you  Dwight Mccormick

## 2022-06-28 NOTE — TELEPHONE ENCOUNTER
[facility] not see the refill so [facility] cannot fill it  She is down to 5 pills  Please call pharmacy to straighten out  Please call patient to let her know what's up    [phone] Sent to wrong pharmacy. Please resend to this pharmacy. Requested Prescriptions     Pending Prescriptions Disp Refills    Insulin Pen Needle (PEN NEEDLES) 30G X 5 MM MISC 100 each 1     Si pen by Does not apply route nightly Pt to use with Lantus nightly.      DX: E11.9

## 2022-07-17 DIAGNOSIS — G25.81 RESTLESS LEGS SYNDROME: ICD-10-CM

## 2022-07-17 RX ORDER — GABAPENTIN 300 MG/1
CAPSULE ORAL
Qty: 90 CAPSULE | Refills: 1 | Status: SHIPPED | OUTPATIENT
Start: 2022-07-17

## 2022-08-27 DIAGNOSIS — K21.9 GASTROESOPHAGEAL REFLUX DISEASE WITHOUT ESOPHAGITIS: ICD-10-CM

## 2022-08-27 RX ORDER — FAMOTIDINE 40 MG/1
TABLET, FILM COATED ORAL
Qty: 90 TABLET | Refills: 1 | Status: SHIPPED | OUTPATIENT
Start: 2022-08-27

## 2022-09-21 DIAGNOSIS — I15.2 HYPERTENSION ASSOCIATED WITH DIABETES (HCC): ICD-10-CM

## 2022-09-21 DIAGNOSIS — E78.2 MIXED HYPERLIPIDEMIA: Primary | ICD-10-CM

## 2022-09-21 DIAGNOSIS — E11.59 HYPERTENSION ASSOCIATED WITH DIABETES (HCC): ICD-10-CM

## 2022-09-21 DIAGNOSIS — E11.9 TYPE 2 DIABETES MELLITUS WITHOUT COMPLICATION, WITHOUT LONG-TERM CURRENT USE OF INSULIN (HCC): ICD-10-CM

## 2022-09-21 RX ORDER — IRBESARTAN 150 MG/1
150 TABLET ORAL DAILY
Qty: 90 TABLET | Refills: 1 | Status: SHIPPED | OUTPATIENT
Start: 2022-09-21 | End: 2022-09-21

## 2022-09-21 RX ORDER — IRBESARTAN 150 MG/1
150 TABLET ORAL DAILY
Qty: 90 TABLET | Refills: 0 | Status: SHIPPED | OUTPATIENT
Start: 2022-09-21

## 2022-09-21 RX ORDER — ATORVASTATIN CALCIUM 20 MG/1
20 TABLET, FILM COATED ORAL DAILY
Qty: 90 TABLET | Refills: 1 | Status: SHIPPED | OUTPATIENT
Start: 2022-09-21 | End: 2022-09-21

## 2022-09-21 RX ORDER — ATORVASTATIN CALCIUM 20 MG/1
20 TABLET, FILM COATED ORAL DAILY
Qty: 90 TABLET | Refills: 0 | Status: SHIPPED | OUTPATIENT
Start: 2022-09-21

## 2022-11-17 DIAGNOSIS — F41.9 ANXIETY: ICD-10-CM

## 2022-11-17 DIAGNOSIS — G25.81 RESTLESS LEGS SYNDROME: ICD-10-CM

## 2022-11-17 DIAGNOSIS — E11.8 TYPE 2 DIABETES MELLITUS WITH COMPLICATION, WITHOUT LONG-TERM CURRENT USE OF INSULIN (HCC): ICD-10-CM

## 2022-11-17 DIAGNOSIS — R05.3 CHRONIC COUGH: ICD-10-CM

## 2022-11-17 RX ORDER — MONTELUKAST SODIUM 10 MG/1
TABLET ORAL
Qty: 90 TABLET | Refills: 1 | Status: SHIPPED | OUTPATIENT
Start: 2022-11-17

## 2022-12-27 DIAGNOSIS — E78.2 MIXED HYPERLIPIDEMIA: ICD-10-CM

## 2022-12-27 RX ORDER — ATORVASTATIN CALCIUM 20 MG/1
20 TABLET, FILM COATED ORAL DAILY
Qty: 90 TABLET | Refills: 0 | Status: SHIPPED | OUTPATIENT
Start: 2022-12-27

## 2023-01-16 DIAGNOSIS — Z12.39 SCREENING FOR BREAST CANCER: ICD-10-CM

## 2023-01-19 DIAGNOSIS — G25.81 RESTLESS LEGS SYNDROME: ICD-10-CM

## 2023-01-19 RX ORDER — GABAPENTIN 300 MG/1
CAPSULE ORAL
Qty: 90 CAPSULE | Refills: 1 | Status: SHIPPED | OUTPATIENT
Start: 2023-01-19

## 2023-02-19 LAB
BUN SERPL-MCNC: 23 MG/DL (ref 7–25)
BUN/CREAT SERPL: NORMAL (CALC) (ref 6–22)
CALCIUM SERPL-MCNC: 9.9 MG/DL (ref 8.6–10.4)
CHLORIDE SERPL-SCNC: 108 MMOL/L (ref 98–110)
CHOLEST SERPL-MCNC: 141 MG/DL
CHOLEST/HDLC SERPL: 3.1 (CALC)
CO2 SERPL-SCNC: 24 MMOL/L (ref 20–32)
CREAT SERPL-MCNC: 1 MG/DL (ref 0.5–1.05)
GFR/BSA.PRED SERPLBLD CYS-BASED-ARV: 63 ML/MIN/1.73M2
GLUCOSE SERPL-MCNC: 90 MG/DL (ref 65–99)
HBA1C MFR BLD: 5.6 % OF TOTAL HGB
HDLC SERPL-MCNC: 45 MG/DL
LDLC SERPL CALC-MCNC: 75 MG/DL (CALC)
NONHDLC SERPL-MCNC: 96 MG/DL (CALC)
POTASSIUM SERPL-SCNC: 4.7 MMOL/L (ref 3.5–5.3)
SODIUM SERPL-SCNC: 141 MMOL/L (ref 135–146)
TRIGL SERPL-MCNC: 124 MG/DL

## 2023-03-02 ENCOUNTER — OFFICE VISIT (OUTPATIENT)
Dept: FAMILY MEDICINE CLINIC | Facility: CLINIC | Age: 68
End: 2023-03-02

## 2023-03-02 VITALS
OXYGEN SATURATION: 98 % | BODY MASS INDEX: 28.61 KG/M2 | HEIGHT: 66 IN | TEMPERATURE: 97.9 F | WEIGHT: 178 LBS | SYSTOLIC BLOOD PRESSURE: 142 MMHG | HEART RATE: 88 BPM | DIASTOLIC BLOOD PRESSURE: 84 MMHG | RESPIRATION RATE: 16 BRPM

## 2023-03-02 DIAGNOSIS — Z23 ENCOUNTER FOR VACCINATION: ICD-10-CM

## 2023-03-02 DIAGNOSIS — E11.9 TYPE 2 DIABETES MELLITUS WITHOUT COMPLICATION, WITHOUT LONG-TERM CURRENT USE OF INSULIN (HCC): Primary | ICD-10-CM

## 2023-03-02 DIAGNOSIS — J45.30 MILD PERSISTENT ASTHMA WITHOUT COMPLICATION: ICD-10-CM

## 2023-03-02 DIAGNOSIS — I15.2 HYPERTENSION ASSOCIATED WITH DIABETES (HCC): ICD-10-CM

## 2023-03-02 DIAGNOSIS — Z00.00 HEALTHCARE MAINTENANCE: ICD-10-CM

## 2023-03-02 DIAGNOSIS — L20.82 FLEXURAL ECZEMA: ICD-10-CM

## 2023-03-02 DIAGNOSIS — E78.2 MIXED HYPERLIPIDEMIA: ICD-10-CM

## 2023-03-02 DIAGNOSIS — E11.59 HYPERTENSION ASSOCIATED WITH DIABETES (HCC): ICD-10-CM

## 2023-03-02 DIAGNOSIS — Z78.0 POSTMENOPAUSAL: ICD-10-CM

## 2023-03-02 DIAGNOSIS — Z13.820 OSTEOPOROSIS SCREENING: ICD-10-CM

## 2023-03-02 RX ORDER — IRBESARTAN AND HYDROCHLOROTHIAZIDE 150; 12.5 MG/1; MG/1
1 TABLET, FILM COATED ORAL DAILY
Qty: 30 TABLET | Refills: 2 | Status: SHIPPED | OUTPATIENT
Start: 2023-03-02

## 2023-03-02 RX ORDER — BETAMETHASONE DIPROPIONATE 0.05 %
OINTMENT (GRAM) TOPICAL 2 TIMES DAILY
Qty: 45 G | Refills: 2 | Status: SHIPPED | OUTPATIENT
Start: 2023-03-02

## 2023-03-02 NOTE — ASSESSMENT & PLAN NOTE
Labs reviewed with patient  Colonoscopy UTD 04/27/2022  Mammogram done 12/2021 through [facility]  Pap due however pt is 67 and would like to stop screening  DEXA ordered

## 2023-03-02 NOTE — ASSESSMENT & PLAN NOTE
Lab Results   Component Value Date    HGBA1C 5 6 02/18/2023   Well controlled with diet and metformin  Ophthalmologist is [facility] in the [place] [address]  Lipids well controlled on lipitor 20mg daily  Diabetic foot exam completed today

## 2023-03-02 NOTE — ASSESSMENT & PLAN NOTE
-  uncontrolled  - Blood pressure goal per accord trial would be <140/90   - On irbesartan 150  - Restrict daily salt intake up to 2 4 g  - Advised to walk 30 minutes a day 5 times a week and practice stress relieving measures  - add hctz 12 5 to irbesartan   Recheck in 1month with bmp

## 2023-03-02 NOTE — PROGRESS NOTES
Assessment/Plan:    Diabetes mellitus ([place] [address] )    Lab Results   Component Value Date    HGBA1C 5 6 02/18/2023   Well controlled with diet and metformin  Ophthalmologist is [facility] in the [place] [id number]  Lipids well controlled on lipitor 20mg daily  Diabetic foot exam completed today  Hyperlipidemia  Well controlled on Lipitor 20 and fish oil  Hypertension associated with diabetes ([place] [address] )  -  uncontrolled  - Blood pressure goal per accord trial would be <140/90   - On irbesartan 150  - Restrict daily salt intake up to 2 4 g  - Advised to walk 30 minutes a day 5 times a week and practice stress relieving measures  - add hctz 12 5 to irbesartan   Recheck in 1month with bmp           Mild persistent asthma without complication   Continue Breo daily and albuterol as needed  Healthcare maintenance  Labs reviewed with patient  Colonoscopy UTD 04/27/2022  Mammogram done 12/2021 through [facility]  Pap due however pt is 67 and would like to stop screening  DEXA ordered  Diagnoses and all orders for this visit:    Type 2 diabetes mellitus without complication, without long-term current use of insulin (HCC)  -     Microalbumin / creatinine urine ratio    Mixed hyperlipidemia    Hypertension associated with diabetes ([place] [address] )  -     irbesartan-hydrochlorothiazide (AVALIDE) 150-12 5 MG per tablet; Take 1 tablet by mouth daily  -     Basic metabolic panel; Future    Mild persistent asthma without complication    Postmenopausal  -     DXA bone density spine hip and pelvis; Future    Osteoporosis screening  -     DXA bone density spine hip and pelvis; Future    Healthcare maintenance    Encounter for vaccination  -     Pneumococcal Conjugate Vaccine 20-valent (Pcv20)    Flexural eczema  -     betamethasone dipropionate (DIPROSONE) 0 05 % ointment;  Apply topically 2 (two) times a day        Subjective: chronic conditions      Patient ID: Fan Cabrera is a 74 y o  female with a history of generalized anxiety disorder, diabetes mellitus type 2, hyperlipidemia, asthma    HPI  Labs reviewed  Stable kidney function  Lipids at goal, a1c well controlled at 5 6  The following portions of the patient's history were reviewed and updated as appropriate: allergies, current medications, past family history, past medical history, past social history, past surgical history and problem list     Review of Systems   Constitutional: Negative for fever and unexpected weight change  HENT: Negative for ear pain, sore throat and trouble swallowing  Eyes: Negative for pain and visual disturbance  Respiratory: Negative for cough, chest tightness, shortness of breath and wheezing  Cardiovascular: Negative for chest pain  Gastrointestinal: Negative for abdominal distention, abdominal pain, blood in stool, constipation, diarrhea, nausea and vomiting  Endocrine: Negative for polydipsia and polyuria  Genitourinary: Negative for dysuria and hematuria  Musculoskeletal: Negative for back pain and myalgias  Skin: Negative for rash  Neurological: Negative for syncope and headaches  Psychiatric/Behavioral: Negative for suicidal ideas  PHQ-2/9 Depression Screening    Little interest or pleasure in doing things: 0 - not at all  Feeling down, depressed, or hopeless: 0 - not at all  PHQ-2 Score: 0  PHQ-2 Interpretation: Negative depression screen       [unfilled]    Objective:      /84 (BP Location: Left arm, Patient Position: Sitting, Cuff Size: Adult)   Pulse 88   Temp 97 9 °F (36 6 °C) (Tympanic)   Resp 16   Ht 5' 6" (1 676 m)   Wt 80 7 kg (178 lb)   SpO2 98%   BMI 28 73 kg/m²          Physical Exam  Constitutional:       Appearance: She is well-developed  HENT:      Head: Normocephalic and atraumatic  Right Ear: External ear normal       Left Ear: External ear normal       Mouth/Throat:      Pharynx: No oropharyngeal exudate  Eyes:      General: No scleral icterus  Conjunctiva/sclera: Conjunctivae normal       Pupils: Pupils are equal, round, and reactive to light  Cardiovascular:      Rate and Rhythm: Normal rate and regular rhythm  Pulses: no weak pulses          Dorsalis pedis pulses are 2+ on the right side and 2+ on the left side  Posterior tibial pulses are 2+ on the right side and 2+ on the left side  Heart sounds: No murmur heard  No friction rub  No gallop  Pulmonary:      Effort: Pulmonary effort is normal  No respiratory distress  Breath sounds: Normal breath sounds  No wheezing or rales  Abdominal:      General: Bowel sounds are normal  There is no distension  Palpations: Abdomen is soft  There is no mass  Tenderness: There is no abdominal tenderness  There is no rebound  Musculoskeletal:         General: Normal range of motion  Cervical back: Normal range of motion and neck supple  Feet:      Right foot:      Skin integrity: No ulcer, skin breakdown, erythema, warmth, callus or dry skin  Left foot:      Skin integrity: No ulcer, skin breakdown, erythema, warmth, callus or dry skin  Skin:     General: Skin is warm and dry  Neurological:      Mental Status: She is alert and oriented to person, place, and time  Patient's shoes and socks removed  Right Foot/Ankle   Right Foot Inspection  Skin Exam: skin normal and skin intact  No dry skin, no warmth, no callus, no erythema, no maceration, no abnormal color, no pre-ulcer, no ulcer and no callus  Toe Exam: ROM and strength within normal limits  Sensory   Vibration: intact  Proprioception: intact  Monofilament testing: intact    Vascular  Capillary refills: < 3 seconds  The right DP pulse is 2+  The right PT pulse is 2+  Left Foot/Ankle  Left Foot Inspection  Skin Exam: skin normal and skin intact  No dry skin, no warmth, no erythema, no maceration, normal color, no pre-ulcer, no ulcer and no callus       Toe Exam: ROM and strength within normal limits  Sensory   Vibration: intact  Proprioception: intact  Monofilament testing: intact    Vascular  Capillary refills: < 3 seconds  The left DP pulse is 2+  The left PT pulse is 2+       Assign Risk Category  No deformity present  No loss of protective sensation  No weak pulses  Risk: 0

## 2023-03-03 LAB
CREAT UR-MCNC: 68.5 MG/DL
MICROALBUMIN UR-MCNC: 25.8 MG/L (ref 0–20)
MICROALBUMIN/CREAT 24H UR: 38 MG/G CREATININE (ref 0–30)

## 2023-03-26 DIAGNOSIS — E78.2 MIXED HYPERLIPIDEMIA: ICD-10-CM

## 2023-03-26 RX ORDER — ATORVASTATIN CALCIUM 20 MG/1
TABLET, FILM COATED ORAL
Qty: 90 TABLET | Refills: 0 | Status: SHIPPED | OUTPATIENT
Start: 2023-03-26

## 2023-05-01 PROBLEM — Z00.00 HEALTHCARE MAINTENANCE: Status: RESOLVED | Noted: 2021-03-11 | Resolved: 2023-05-01

## 2023-05-21 DIAGNOSIS — E11.8 TYPE 2 DIABETES MELLITUS WITH COMPLICATION, WITHOUT LONG-TERM CURRENT USE OF INSULIN (HCC): ICD-10-CM

## 2023-05-21 DIAGNOSIS — G25.81 RESTLESS LEGS SYNDROME: ICD-10-CM

## 2023-05-21 DIAGNOSIS — F41.9 ANXIETY: ICD-10-CM

## 2023-05-21 DIAGNOSIS — R05.3 CHRONIC COUGH: ICD-10-CM

## 2023-05-21 RX ORDER — MONTELUKAST SODIUM 10 MG/1
TABLET ORAL
Qty: 90 TABLET | Refills: 1 | Status: SHIPPED | OUTPATIENT
Start: 2023-05-21

## 2023-06-26 DIAGNOSIS — E78.2 MIXED HYPERLIPIDEMIA: ICD-10-CM

## 2023-06-26 RX ORDER — ATORVASTATIN CALCIUM 20 MG/1
TABLET, FILM COATED ORAL
Qty: 90 TABLET | Refills: 0 | Status: SHIPPED | OUTPATIENT
Start: 2023-06-26

## 2023-08-06 DIAGNOSIS — G25.81 RESTLESS LEGS SYNDROME: ICD-10-CM

## 2023-08-06 RX ORDER — GABAPENTIN 300 MG/1
CAPSULE ORAL
Qty: 90 CAPSULE | Refills: 1 | Status: SHIPPED | OUTPATIENT
Start: 2023-08-06

## 2023-09-25 DIAGNOSIS — I15.2 HYPERTENSION ASSOCIATED WITH DIABETES: ICD-10-CM

## 2023-09-25 DIAGNOSIS — E11.59 HYPERTENSION ASSOCIATED WITH DIABETES: ICD-10-CM

## 2023-09-25 DIAGNOSIS — E78.2 MIXED HYPERLIPIDEMIA: ICD-10-CM

## 2023-09-25 RX ORDER — ATORVASTATIN CALCIUM 20 MG/1
TABLET, FILM COATED ORAL
Qty: 90 TABLET | Refills: 0 | Status: SHIPPED | OUTPATIENT
Start: 2023-09-25

## 2023-09-26 RX ORDER — IRBESARTAN AND HYDROCHLOROTHIAZIDE 150; 12.5 MG/1; MG/1
1 TABLET, FILM COATED ORAL DAILY
Qty: 90 TABLET | Refills: 1 | Status: SHIPPED | OUTPATIENT
Start: 2023-09-26

## 2023-10-02 DIAGNOSIS — K21.9 GASTROESOPHAGEAL REFLUX DISEASE WITHOUT ESOPHAGITIS: ICD-10-CM

## 2023-10-02 RX ORDER — FAMOTIDINE 40 MG/1
TABLET, FILM COATED ORAL
Qty: 90 TABLET | Refills: 1 | Status: SHIPPED | OUTPATIENT
Start: 2023-10-02

## 2023-11-25 DIAGNOSIS — G25.81 RESTLESS LEGS SYNDROME: ICD-10-CM

## 2023-11-25 DIAGNOSIS — F41.9 ANXIETY: ICD-10-CM

## 2023-11-25 DIAGNOSIS — R05.3 CHRONIC COUGH: ICD-10-CM

## 2023-11-25 DIAGNOSIS — E11.8 TYPE 2 DIABETES MELLITUS WITH COMPLICATION, WITHOUT LONG-TERM CURRENT USE OF INSULIN (HCC): ICD-10-CM

## 2023-11-26 RX ORDER — MONTELUKAST SODIUM 10 MG/1
TABLET ORAL
Qty: 90 TABLET | Refills: 1 | Status: SHIPPED | OUTPATIENT
Start: 2023-11-26

## 2023-12-28 DIAGNOSIS — E78.2 MIXED HYPERLIPIDEMIA: ICD-10-CM

## 2023-12-28 RX ORDER — ATORVASTATIN CALCIUM 20 MG/1
TABLET, FILM COATED ORAL
Qty: 90 TABLET | Refills: 0 | Status: SHIPPED | OUTPATIENT
Start: 2023-12-28

## 2024-01-03 ENCOUNTER — OFFICE VISIT (OUTPATIENT)
Dept: FAMILY MEDICINE CLINIC | Facility: CLINIC | Age: 69
End: 2024-01-03
Payer: COMMERCIAL

## 2024-01-03 VITALS
RESPIRATION RATE: 16 BRPM | HEIGHT: 66 IN | DIASTOLIC BLOOD PRESSURE: 57 MMHG | HEART RATE: 84 BPM | OXYGEN SATURATION: 100 % | BODY MASS INDEX: 28.12 KG/M2 | WEIGHT: 175 LBS | SYSTOLIC BLOOD PRESSURE: 119 MMHG | TEMPERATURE: 97.2 F

## 2024-01-03 DIAGNOSIS — G25.81 RESTLESS LEGS SYNDROME: ICD-10-CM

## 2024-01-03 DIAGNOSIS — F41.9 ANXIETY: ICD-10-CM

## 2024-01-03 DIAGNOSIS — E11.59 HYPERTENSION ASSOCIATED WITH DIABETES: ICD-10-CM

## 2024-01-03 DIAGNOSIS — I15.2 HYPERTENSION ASSOCIATED WITH DIABETES: ICD-10-CM

## 2024-01-03 DIAGNOSIS — E11.9 TYPE 2 DIABETES MELLITUS WITHOUT COMPLICATION, WITHOUT LONG-TERM CURRENT USE OF INSULIN (HCC): Primary | ICD-10-CM

## 2024-01-03 DIAGNOSIS — E78.2 MIXED HYPERLIPIDEMIA: ICD-10-CM

## 2024-01-03 LAB — SL AMB POCT HEMOGLOBIN AIC: 5.7 (ref ?–6.5)

## 2024-01-03 PROCEDURE — 99214 OFFICE O/P EST MOD 30 MIN: CPT | Performed by: NURSE PRACTITIONER

## 2024-01-03 PROCEDURE — 83036 HEMOGLOBIN GLYCOSYLATED A1C: CPT | Performed by: NURSE PRACTITIONER

## 2024-01-03 RX ORDER — SERTRALINE HYDROCHLORIDE 100 MG/1
100 TABLET, FILM COATED ORAL DAILY
Qty: 90 TABLET | Refills: 1 | Status: SHIPPED | OUTPATIENT
Start: 2024-01-03

## 2024-01-03 RX ORDER — GABAPENTIN 300 MG/1
600 CAPSULE ORAL
Qty: 180 CAPSULE | Refills: 1 | Status: SHIPPED | OUTPATIENT
Start: 2024-01-03

## 2024-01-03 NOTE — ASSESSMENT & PLAN NOTE
Lab Results   Component Value Date    HGBA1C 5.6 02/18/2023     POC A1C today at 5.7  Eye exam scheduled for next week in [facility]   Continue metformin 1000mg daily  She is on statin therapy

## 2024-01-03 NOTE — ASSESSMENT & PLAN NOTE
Symptoms improved but not yet optimized with Gabapentin 300 mg at HS  Increase Gabapentin to 600mg at HS, SE reviewed  Avoid caffeine, stay hydrated, can try a weighted blanket   She has been intolerant of Requip in the past

## 2024-01-03 NOTE — ASSESSMENT & PLAN NOTE
Lab Results   Component Value Date    HGBA1C 5.6 02/18/2023   Stable, continue Irbesartan-HCTZ  Follow a low sodium diet

## 2024-01-03 NOTE — ASSESSMENT & PLAN NOTE
Increased anxiety with work   Increase Zoloft to 100 mg daily, SE reviewed  She is planning to retire in June 2024  Declined Atarax for prn use

## 2024-01-03 NOTE — PROGRESS NOTES
Name: Judit Sawyer      : 1955      MRN: [id number]  Encounter Provider: AARTI Smith  Encounter Date: 1/3/2024   Encounter department: [facility]    Assessment & Plan     1. Type 2 diabetes mellitus without complication, without long-term current use of insulin (HCC)  Assessment & Plan:    Lab Results   Component Value Date    HGBA1C 5.6 2023     POC A1C today at 5.7  Eye exam scheduled for next week in [facility]   Continue metformin 1000mg daily  She is on statin therapy       Orders:  -     POCT hemoglobin A1c  -     Comprehensive metabolic panel; Future; Expected date: 2024  -     CBC and differential; Future; Expected date: 2024  -     Albumin / creatinine urine ratio; Future; Expected date: 2024  -     Lipid Panel with Direct LDL reflex; Future; Expected date: 2024  -     TSH, 3rd generation with Free T4 reflex; Future; Expected date: 2024    2. Hypertension associated with diabetes   Assessment & Plan:    Lab Results   Component Value Date    HGBA1C 5.6 2023   Stable, continue Irbesartan-HCTZ  Follow a low sodium diet       3. Mixed hyperlipidemia  Assessment & Plan:  Updated lipid panel ordered  Continue statin therapy       4. Restless legs syndrome  Assessment & Plan:  Symptoms improved but not yet optimized with Gabapentin 300 mg at HS  Increase Gabapentin to 600mg at HS, SE reviewed  Avoid caffeine, stay hydrated, can try a weighted blanket   She has been intolerant of Requip in the past     Orders:  -     gabapentin (NEURONTIN) 300 mg capsule; Take 2 capsules (600 mg total) by mouth daily at bedtime    5. Anxiety  Assessment & Plan:  Increased anxiety with work   Increase Zoloft to 100 mg daily, SE reviewed  She is planning to retire in 2024  Declined Atarax for prn use    Orders:  -     sertraline (ZOLOFT) 100 mg tablet; Take 1 tablet (100 mg total) by mouth daily           Subjective     HPI    Pt  presents by herself today for a routine follow up  She will be retiring in June- looking forward to this    Taking Gabapentin 300 mg at HS for RLS. She feels this helps but not as much as before.  She would like to increase this dosage    Also notes increased anxiety with her job.  She feels more anxious on a daily basis and would like to increase her Zoloft.  She does not want anything for prn use.  Denies depression.   is very supportive     DMT2- taking Metformin 1000mg daily.  POC A1C today at 5.7  She has an eye exam scheduled in [facility] next week      Review of Systems   Constitutional:  Negative for chills, fatigue, fever and unexpected weight change.   HENT:  Negative for congestion, ear pain, hearing loss, postnasal drip, rhinorrhea, sinus pressure and sore throat.    Eyes:  Negative for pain and visual disturbance.   Respiratory:  Negative for cough, shortness of breath and wheezing.    Cardiovascular:  Negative for chest pain, palpitations and leg swelling.   Gastrointestinal:  Negative for abdominal pain, blood in stool, constipation, diarrhea, nausea and vomiting.   Genitourinary:  Negative for dysuria and hematuria.   Musculoskeletal:  Negative for arthralgias, back pain and myalgias.   Skin:  Negative for color change, rash and wound.   Neurological:  Negative for dizziness, seizures, syncope, weakness, numbness and headaches.   Hematological:  Negative for adenopathy. Does not bruise/bleed easily.   Psychiatric/Behavioral:  Negative for dysphoric mood, sleep disturbance and suicidal ideas. The patient is nervous/anxious.    All other systems reviewed and are negative.      Past Medical History:   Diagnosis Date    Chronic cough 5/15/2018    Colon polyp     Diabetes mellitus (HCC)     Eczema     Gallbladder disease     Hyperlipidemia     Hypertension     Insomnia      Past Surgical History:   Procedure Laterality Date    CHOLECYSTECTOMY      COLONOSCOPY      NASAL SEPTUM SURGERY       nasal septal deviation repair    MA ESOPHAGOGASTRODUODENOSCOPY TRANSORAL DIAGNOSTIC N/A 2/25/2016    Procedure: EGD AND COLONOSCOPY;  Surgeon: Kenny Irving MD;  Location: [facility];  Service: Gastroenterology     Family History   Problem Relation Age of Onset    Heart attack Father         acute MI    Cancer Sister         R/t Mad Cow disease    Neuropathy Mother     Emphysema Sister     No Known Problems Sister     No Known Problems Sister      Social History     Socioeconomic History    Marital status: /Civil Union     Spouse name: Kenneth    Number of children: 0    Years of education: None    Highest education level: None   Occupational History    Occupation: [employer]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   Tobacco Use    Smoking status: Never    Smokeless tobacco: Never    Tobacco comments:     no known exposure to tobacco smoke   Vaping Use    Vaping status: Never Used   Substance and Sexual Activity    Alcohol use: Not Currently    Drug use: Never    Sexual activity: None   Other Topics Concern    None   Social History Narrative    Lives with     Pt has living will    Always uses seatbelt    Active caffeine use        Who lives in your home:      What type of home do you live in: Single house    Age of your home: built 1940    How long have you been living there: 20 yrs    Type of heat: Radiator    Type of fuel: Gas    What type of rama is in your bedroom: Carpet    Do you have the following in or near your home:    Air products: Window air conditioning, Humidifier in bedroom in winter only, and Dehumidifier in basement when damp days    Pests: None    Pets: None    Are pets allowed in bedroom: N/A    Open fields, wooded areas nearby: N/A    Basement: Dry and Unfinished, dehumidifier on damp days    Exposure to second hand smoke: No        Habits:    Caffeine: Current; Amount: 1 can/day diet soda, #years 57 yrs    Chocolate: occasionally    Other:     Social Determinants of Health      Financial Resource Strain: Low Risk  (2/26/2020)    Overall Financial Resource Strain (CARDIA)     Difficulty of Paying Living Expenses: Not hard at all   Food Insecurity: No Food Insecurity (2/26/2020)    Hunger Vital Sign     Worried About Running Out of Food in the Last Year: Never true     Ran Out of Food in the Last Year: Never true   Transportation Needs: No Transportation Needs (2/26/2020)    PRAPARE - Transportation     Lack of Transportation (Medical): No     Lack of Transportation (Non-Medical): No   Physical Activity: Insufficiently Active (6/20/2023)    Exercise Vital Sign     Days of Exercise per Week: 2 days     Minutes of Exercise per Session: 30 min   Stress: No Stress Concern Present (2/26/2020)    [employer] [place] [employer] - Occupational Stress Questionnaire     Feeling of Stress : Not at all   Social Connections: Unknown (2/26/2020)    Social Connection and Isolation Panel [NHANES]     Frequency of Communication with Friends and Family: Patient declined     Frequency of Social Gatherings with Friends and Family: Patient declined     Attends Orthodox Services: Patient declined     Active Member of Clubs or Organizations: Patient declined     Attends Club or Organization Meetings: Patient declined     Marital Status: Patient declined   Intimate Partner Violence: Not At Risk (2/26/2020)    Humiliation, Afraid, Rape, and Kick questionnaire     Fear of Current or Ex-Partner: No     Emotionally Abused: No     Physically Abused: No     Sexually Abused: No   Housing Stability: Not on file     Current Outpatient Medications on File Prior to Visit   Medication Sig    atorvastatin (LIPITOR) 20 mg tablet take 1 tablet by mouth once daily    calcium-vitamin D (OSCAL) 250-125 MG-UNIT per tablet Take 1 tablet by mouth daily.    famotidine (PEPCID) 40 MG tablet take 1 tablet by mouth daily at bedtime AS NEEDED FOR HEARTBURN    fluticasone (FLONASE) 50 mcg/act nasal spray 1 spray into each  "nostril daily    irbesartan-hydrochlorothiazide (AVALIDE) 150-12.5 MG per tablet take 1 tablet by mouth once daily    metFORMIN (GLUCOPHAGE) 1000 MG tablet take 1 tablet by mouth every morning and evening with meals    montelukast (SINGULAIR) 10 mg tablet take 1 tablet by mouth once daily    Multiple Vitamin (MULTIVITAMIN) tablet Take 1 tablet by mouth daily.    [DISCONTINUED] gabapentin (NEURONTIN) 300 mg capsule take 1 capsule by mouth at bedtime    [DISCONTINUED] sertraline (ZOLOFT) 50 mg tablet take 1 tablet by mouth once daily    azelastine (ASTELIN) 0.1 % nasal spray 1 spray into each nostril 2 (two) times a day Use in each nostril as directed    betamethasone dipropionate (DIPROSONE) 0.05 % ointment Apply topically 2 (two) times a day (Patient not taking: Reported on 1/3/2024)    fluticasone-vilanterol (BREO ELLIPTA) 200-25 MCG/INH inhaler Inhale 1 puff daily Rinse mouth after use.     Allergies   Allergen Reactions    Cephalexin      Annotation - [id number]: n/v     Immunization History   Administered Date(s) Administered    COVID-19 MODERNA VACC 0.5 ML IM 02/04/2021    COVID-19 Moderna mRNA Vaccine 12 Yr+ 50 mcg/0.5 mL (Spikevax) 10/07/2023    INFLUENZA 10/06/2016, 09/23/2017, 09/30/2017, 11/04/2018, 11/01/2019, 10/01/2020, 10/03/2020    Influenza Injectable, MDCK, Preservative Free, Quadrivalent, 0.5 mL 10/26/2019    Influenza Quadrivalent 3 years and older 11/01/2019, 10/01/2020    Influenza, seasonal, injectable 10/06/2016, 09/30/2017    Influenza, seasonal, injectable, preservative free 11/10/2018    Pneumococcal Conjugate Vaccine 20-valent (Pcv20), Polysace 03/02/2023    Pneumococcal Polysaccharide PPV23 08/21/2019    Tdap 06/21/2016    Zoster Vaccine Recombinant 01/18/2020, 03/28/2020       Objective     /57 (BP Location: Left arm, Patient Position: Sitting, Cuff Size: Adult)   Pulse 84   Temp (!) 97.2 °F (36.2 °C) (Temporal)   Resp 16   Ht 5' 6\" (1.676 m)   Wt 79.4 kg (175 lb)   SpO2 100%  "  BMI 28.25 kg/m²     Physical Exam  Constitutional:       General: She is not in acute distress.     Appearance: Normal appearance. She is well-developed. She is not ill-appearing, toxic-appearing or diaphoretic.   HENT:      Head: Normocephalic and atraumatic.   Eyes:      Extraocular Movements: Extraocular movements intact.      Conjunctiva/sclera: Conjunctivae normal.      Pupils: Pupils are equal, round, and reactive to light.   Neck:      Thyroid: No thyromegaly.   Cardiovascular:      Rate and Rhythm: Normal rate and regular rhythm.      Heart sounds: Normal heart sounds. No murmur heard.  Pulmonary:      Effort: Pulmonary effort is normal. No respiratory distress.      Breath sounds: Normal breath sounds. No wheezing.   Abdominal:      General: Bowel sounds are normal. There is no distension.      Palpations: Abdomen is soft.      Tenderness: There is no abdominal tenderness.   Musculoskeletal:         General: Normal range of motion.      Cervical back: Normal range of motion and neck supple. No rigidity or tenderness.   Lymphadenopathy:      Cervical: No cervical adenopathy.   Skin:     General: Skin is warm and dry.   Neurological:      General: No focal deficit present.      Mental Status: She is alert and oriented to person, place, and time.   Psychiatric:         Mood and Affect: Mood normal.         Behavior: Behavior normal.         Thought Content: Thought content normal.         Judgment: Judgment normal.       AARTI Smith

## 2024-01-06 LAB
LEFT EYE DIABETIC RETINOPATHY: NORMAL
RIGHT EYE DIABETIC RETINOPATHY: NORMAL

## 2024-03-30 DIAGNOSIS — E78.2 MIXED HYPERLIPIDEMIA: ICD-10-CM

## 2024-04-01 RX ORDER — ATORVASTATIN CALCIUM 20 MG/1
TABLET, FILM COATED ORAL
Qty: 90 TABLET | Refills: 0 | Status: SHIPPED | OUTPATIENT
Start: 2024-04-01

## 2024-04-06 DIAGNOSIS — I15.2 HYPERTENSION ASSOCIATED WITH DIABETES  (HCC): ICD-10-CM

## 2024-04-06 DIAGNOSIS — E11.59 HYPERTENSION ASSOCIATED WITH DIABETES  (HCC): ICD-10-CM

## 2024-04-06 DIAGNOSIS — K21.9 GASTROESOPHAGEAL REFLUX DISEASE WITHOUT ESOPHAGITIS: ICD-10-CM

## 2024-04-08 RX ORDER — FAMOTIDINE 40 MG/1
TABLET, FILM COATED ORAL
Qty: 90 TABLET | Refills: 1 | Status: SHIPPED | OUTPATIENT
Start: 2024-04-08

## 2024-04-08 RX ORDER — IRBESARTAN AND HYDROCHLOROTHIAZIDE 150; 12.5 MG/1; MG/1
1 TABLET, FILM COATED ORAL DAILY
Qty: 90 TABLET | Refills: 1 | Status: SHIPPED | OUTPATIENT
Start: 2024-04-08

## 2024-06-25 DIAGNOSIS — G25.81 RESTLESS LEGS SYNDROME: ICD-10-CM

## 2024-06-25 RX ORDER — GABAPENTIN 300 MG/1
CAPSULE ORAL
Qty: 60 CAPSULE | Refills: 0 | Status: SHIPPED | OUTPATIENT
Start: 2024-06-25

## 2024-06-27 DIAGNOSIS — F41.9 ANXIETY: ICD-10-CM

## 2024-06-27 DIAGNOSIS — E78.2 MIXED HYPERLIPIDEMIA: ICD-10-CM

## 2024-06-27 RX ORDER — ATORVASTATIN CALCIUM 20 MG/1
TABLET, FILM COATED ORAL
Qty: 90 TABLET | Refills: 0 | Status: SHIPPED | OUTPATIENT
Start: 2024-06-27

## 2024-06-27 RX ORDER — SERTRALINE HYDROCHLORIDE 100 MG/1
100 TABLET, FILM COATED ORAL DAILY
Qty: 90 TABLET | Refills: 1 | Status: SHIPPED | OUTPATIENT
Start: 2024-06-27

## 2024-07-24 DIAGNOSIS — G25.81 RESTLESS LEGS SYNDROME: ICD-10-CM

## 2024-07-24 DIAGNOSIS — E11.8 TYPE 2 DIABETES MELLITUS WITH COMPLICATION, WITHOUT LONG-TERM CURRENT USE OF INSULIN (HCC): ICD-10-CM

## 2024-07-24 RX ORDER — GABAPENTIN 300 MG/1
600 CAPSULE ORAL
Qty: 180 CAPSULE | Refills: 1 | Status: SHIPPED | OUTPATIENT
Start: 2024-07-24

## 2024-07-24 NOTE — TELEPHONE ENCOUNTER
Reason for call:   [x] Refill   [] Prior Auth  [] Other:     Office:   [x] PCP/Provider - AARTI SAUCEDO  [] Specialty/Provider -     Medication: gabapentin/ metformin    Does the patient have enough for 3 days?   [x] Yes   [] No - Send as HP to POD

## 2024-09-14 LAB
ALBUMIN SERPL-MCNC: 4.6 G/DL (ref 3.6–5.1)
ALBUMIN/CREAT UR: 17 MG/G CREAT
ALBUMIN/GLOB SERPL: 1.8 (CALC) (ref 1–2.5)
ALP SERPL-CCNC: 99 U/L (ref 37–153)
ALT SERPL-CCNC: 14 U/L (ref 6–29)
AST SERPL-CCNC: 17 U/L (ref 10–35)
BASOPHILS # BLD AUTO: 107 CELLS/UL (ref 0–200)
BASOPHILS NFR BLD AUTO: 1.3 %
BILIRUB SERPL-MCNC: 0.8 MG/DL (ref 0.2–1.2)
BUN SERPL-MCNC: 17 MG/DL (ref 7–25)
BUN/CREAT SERPL: 16 (CALC) (ref 6–22)
CALCIUM SERPL-MCNC: 10.1 MG/DL (ref 8.6–10.4)
CHLORIDE SERPL-SCNC: 103 MMOL/L (ref 98–110)
CHOLEST SERPL-MCNC: 171 MG/DL
CHOLEST/HDLC SERPL: 3.8 (CALC)
CO2 SERPL-SCNC: 26 MMOL/L (ref 20–32)
CREAT SERPL-MCNC: 1.06 MG/DL (ref 0.5–1.05)
CREAT UR-MCNC: 36 MG/DL (ref 20–275)
EOSINOPHIL # BLD AUTO: 1435 CELLS/UL (ref 15–500)
EOSINOPHIL NFR BLD AUTO: 17.5 %
ERYTHROCYTE [DISTWIDTH] IN BLOOD BY AUTOMATED COUNT: 11.7 % (ref 11–15)
GFR/BSA.PRED SERPLBLD CYS-BASED-ARV: 58 ML/MIN/1.73M2
GLOBULIN SER CALC-MCNC: 2.6 G/DL (CALC) (ref 1.9–3.7)
GLUCOSE SERPL-MCNC: 102 MG/DL (ref 65–99)
HCT VFR BLD AUTO: 43.8 % (ref 35–45)
HDLC SERPL-MCNC: 45 MG/DL
HGB BLD-MCNC: 14.2 G/DL (ref 11.7–15.5)
LDLC SERPL CALC-MCNC: 98 MG/DL (CALC)
LYMPHOCYTES # BLD AUTO: 2198 CELLS/UL (ref 850–3900)
LYMPHOCYTES NFR BLD AUTO: 26.8 %
MCH RBC QN AUTO: 30.3 PG (ref 27–33)
MCHC RBC AUTO-ENTMCNC: 32.4 G/DL (ref 32–36)
MCV RBC AUTO: 93.4 FL (ref 80–100)
MICROALBUMIN UR-MCNC: 0.6 MG/DL
MONOCYTES # BLD AUTO: 713 CELLS/UL (ref 200–950)
MONOCYTES NFR BLD AUTO: 8.7 %
NEUTROPHILS # BLD AUTO: 3747 CELLS/UL (ref 1500–7800)
NEUTROPHILS NFR BLD AUTO: 45.7 %
NONHDLC SERPL-MCNC: 126 MG/DL (CALC)
PLATELET # BLD AUTO: 333 THOUSAND/UL (ref 140–400)
PMV BLD REES-ECKER: 10.6 FL (ref 7.5–12.5)
POTASSIUM SERPL-SCNC: 4.5 MMOL/L (ref 3.5–5.3)
PROT SERPL-MCNC: 7.2 G/DL (ref 6.1–8.1)
RBC # BLD AUTO: 4.69 MILLION/UL (ref 3.8–5.1)
SODIUM SERPL-SCNC: 140 MMOL/L (ref 135–146)
TRIGL SERPL-MCNC: 190 MG/DL
TSH SERPL-ACNC: 3.06 MIU/L (ref 0.4–4.5)
WBC # BLD AUTO: 8.2 THOUSAND/UL (ref 3.8–10.8)

## 2024-09-18 ENCOUNTER — OFFICE VISIT (OUTPATIENT)
Dept: FAMILY MEDICINE CLINIC | Facility: CLINIC | Age: 69
End: 2024-09-18
Payer: MEDICARE

## 2024-09-18 VITALS
TEMPERATURE: 97.3 F | BODY MASS INDEX: 28.28 KG/M2 | OXYGEN SATURATION: 95 % | WEIGHT: 176 LBS | SYSTOLIC BLOOD PRESSURE: 125 MMHG | HEIGHT: 66 IN | HEART RATE: 100 BPM | DIASTOLIC BLOOD PRESSURE: 58 MMHG | RESPIRATION RATE: 16 BRPM

## 2024-09-18 DIAGNOSIS — E11.9 TYPE 2 DIABETES MELLITUS WITHOUT COMPLICATION, WITHOUT LONG-TERM CURRENT USE OF INSULIN (HCC): ICD-10-CM

## 2024-09-18 DIAGNOSIS — K21.9 GASTROESOPHAGEAL REFLUX DISEASE WITHOUT ESOPHAGITIS: ICD-10-CM

## 2024-09-18 DIAGNOSIS — F41.9 ANXIETY: ICD-10-CM

## 2024-09-18 DIAGNOSIS — I15.2 HYPERTENSION ASSOCIATED WITH DIABETES  (HCC): ICD-10-CM

## 2024-09-18 DIAGNOSIS — J45.901 ACUTE EXACERBATION OF EXTRINSIC ASTHMA: ICD-10-CM

## 2024-09-18 DIAGNOSIS — E78.2 MIXED HYPERLIPIDEMIA: ICD-10-CM

## 2024-09-18 DIAGNOSIS — E11.59 HYPERTENSION ASSOCIATED WITH DIABETES  (HCC): ICD-10-CM

## 2024-09-18 DIAGNOSIS — Z78.0 POST-MENOPAUSAL: Primary | ICD-10-CM

## 2024-09-18 DIAGNOSIS — J45.30 MILD PERSISTENT ASTHMA WITHOUT COMPLICATION: ICD-10-CM

## 2024-09-18 DIAGNOSIS — G25.81 RESTLESS LEGS SYNDROME: ICD-10-CM

## 2024-09-18 DIAGNOSIS — J30.1 CHRONIC SEASONAL ALLERGIC RHINITIS DUE TO POLLEN: ICD-10-CM

## 2024-09-18 LAB — SL AMB POCT HEMOGLOBIN AIC: 6 (ref ?–6.5)

## 2024-09-18 PROCEDURE — 99214 OFFICE O/P EST MOD 30 MIN: CPT | Performed by: NURSE PRACTITIONER

## 2024-09-18 PROCEDURE — 83036 HEMOGLOBIN GLYCOSYLATED A1C: CPT | Performed by: NURSE PRACTITIONER

## 2024-09-18 RX ORDER — ATORVASTATIN CALCIUM 20 MG/1
20 TABLET, FILM COATED ORAL DAILY
Qty: 90 TABLET | Refills: 0 | Status: SHIPPED | OUTPATIENT
Start: 2024-09-18

## 2024-09-18 RX ORDER — IRBESARTAN AND HYDROCHLOROTHIAZIDE 150; 12.5 MG/1; MG/1
1 TABLET, FILM COATED ORAL DAILY
Qty: 90 TABLET | Refills: 1 | Status: SHIPPED | OUTPATIENT
Start: 2024-09-18

## 2024-09-18 RX ORDER — PREDNISONE 10 MG/1
TABLET ORAL
Qty: 20 TABLET | Refills: 0 | Status: SHIPPED | OUTPATIENT
Start: 2024-09-18

## 2024-09-18 NOTE — ASSESSMENT & PLAN NOTE
Lab Results   Component Value Date    HGBA1C 6.0 09/18/2024     POC A1C today at 6.0, stable  Eye exam with [facility]   Continue metformin 1000mg daily  She is on statin therapy   Foot exam completed today       Orders:    POCT hemoglobin A1c    Hemoglobin A1C; Future    Comprehensive metabolic panel; Future    CBC and differential; Future    Lipid Panel with Direct LDL reflex; Future    TSH, 3rd generation with Free T4 reflex; Future

## 2024-09-18 NOTE — PROGRESS NOTES
Ambulatory Visit  Name: Judit Sawyer      : 1955      MRN: [id number]  Encounter Provider: AARTI Smith  Encounter Date: 2024   Encounter department: [facility]    Assessment & Plan  Type 2 diabetes mellitus without complication, without long-term current use of insulin (HCC)    Lab Results   Component Value Date    HGBA1C 6.0 2024     POC A1C today at 6.0, stable  Eye exam with [facility]   Continue metformin 1000mg daily  She is on statin therapy   Foot exam completed today       Orders:    POCT hemoglobin A1c    Hemoglobin A1C; Future    Comprehensive metabolic panel; Future    CBC and differential; Future    Lipid Panel with Direct LDL reflex; Future    TSH, 3rd generation with Free T4 reflex; Future    Hypertension associated with diabetes  (HCC)    Lab Results   Component Value Date    HGBA1C 6.0 2024   Stable, continue Irbesartan-HCTZ  Follow a low sodium diet     Orders:    irbesartan-hydrochlorothiazide (AVALIDE) 150-12.5 MG per tablet; Take 1 tablet by mouth daily    Comprehensive metabolic panel; Future    CBC and differential; Future    TSH, 3rd generation with Free T4 reflex; Future    Mixed hyperlipidemia  Updated lipid panel ordered  Continue statin therapy     Orders:    atorvastatin (LIPITOR) 20 mg tablet; Take 1 tablet (20 mg total) by mouth daily    Gastroesophageal reflux disease without esophagitis  Stable, may continue Pepcid prn  Dietary modifications reiterated          Mild persistent asthma without complication  Follow up with Dr. Cummings   Pt is not taking symbicort due to cost- advised her to discuss this with Dr. Cummings and get a formulary list from her insurance for inhalers          Chronic seasonal allergic rhinitis due to pollen  Advised pt to follow up with Allergist, Dr. Cummings   Take medications for allergies as Dr. Cummings has instructed          Anxiety  Stable, continue Zoloft          Restless legs  syndrome  Stable, continue Gabapentin   Avoid caffeine, stay hydrated, can try a weighted blanket   She has been intolerant of Requip in the past          Acute exacerbation of extrinsic asthma    Orders:    predniSONE 10 mg tablet; Take with food. Take 4 tabs x 2 days, then 3 x 2 days, then 2 x 2 days, then 1 x 2 days.    Post-menopausal    Orders:    DXA bone density spine hip and pelvis; Future       History of Present Illness       HPI    Pt presents by herself today for a routine follow up  She retired las month which she is happy about     Taking Gabapentin 600 mg at HS for RLS. She feels this increased dosage does help more     Taking Zoloft for anxiety and she feels her mood has improved since retiring.  She does not want anything for prn use.  Denies depression.   is very supportive      DMT2- taking Metformin 1000mg daily.  POC A1C today at 6.0, previously 5.7  Eye exams are with [facility], this is UTD  Due for foot exam today     She does c/o worsening allergy symptoms over the past few weeks.  Has increased sinus congestion, PND and a dry cough.  She does follow with Dr. Cummings, Allergist, for asthma and seasonal allergies, last seen 6/4/24.  At that time, Dr. Cummings prescribed Symbicort, Azelastine, Nasonex and Singular.  Judit states the nasal sprays and inhaler were too expensive so she is not taking those.  She is requesting a Z-osiel for her symptoms today    History obtained from : patient  Review of Systems   Constitutional:  Negative for activity change, appetite change, chills, diaphoresis, fatigue, fever and unexpected weight change.   HENT:  Positive for congestion, postnasal drip, rhinorrhea, sinus pressure and sneezing. Negative for ear pain, sinus pain, sore throat, tinnitus, trouble swallowing and voice change.    Eyes:  Negative for visual disturbance.   Respiratory:  Positive for cough. Negative for chest tightness, shortness of breath and wheezing.    Cardiovascular:   Negative for chest pain, palpitations and leg swelling.   Gastrointestinal:  Negative for abdominal pain, blood in stool, constipation, diarrhea and nausea.   Genitourinary:  Negative for dysuria.   Musculoskeletal:  Negative for arthralgias and myalgias.   Skin:  Negative for rash and wound.   Neurological:  Negative for dizziness, weakness, numbness and headaches.   Psychiatric/Behavioral:  Negative for dysphoric mood, self-injury, sleep disturbance and suicidal ideas. The patient is not nervous/anxious.      Current Outpatient Medications on File Prior to Visit   Medication Sig Dispense Refill    azelastine (ASTELIN) 0.1 % nasal spray 2 sprays into each nostril daily Use in each nostril as directed 30 mL 11    calcium-vitamin D (OSCAL) 250-125 MG-UNIT per tablet Take 1 tablet by mouth daily.      famotidine (PEPCID) 40 MG tablet take 1 tablet by mouth daily at bedtime AS NEEDED FOR HEARTBURN 90 tablet 1    fluticasone (FLONASE) 50 mcg/act nasal spray 1 spray into each nostril daily 16 g 3    Fluticasone-Salmeterol (Advair) 100-50 mcg/dose inhaler Inhale 1 puff 2 (two) times a day Rinse mouth after use. 60 blister 11    gabapentin (NEURONTIN) 300 mg capsule Take 2 capsules (600 mg total) by mouth daily at bedtime 180 capsule 1    metFORMIN (GLUCOPHAGE) 1000 MG tablet Take 1 tablet (1,000 mg total) by mouth 2 (two) times a day with meals 180 tablet 1    montelukast (SINGULAIR) 10 mg tablet take 1 tablet by mouth once daily 90 tablet 1    Multiple Vitamin (MULTIVITAMIN) tablet Take 1 tablet by mouth daily.      sertraline (ZOLOFT) 100 mg tablet take 1 tablet by mouth once daily 90 tablet 1    [DISCONTINUED] atorvastatin (LIPITOR) 20 mg tablet take 1 tablet by mouth once daily 90 tablet 0    [DISCONTINUED] irbesartan-hydrochlorothiazide (AVALIDE) 150-12.5 MG per tablet take 1 tablet by mouth once daily 90 tablet 1    mometasone (NASONEX) 50 mcg/act nasal spray 2 sprays into each nostril daily (Patient not taking:  "Reported on 9/18/2024) 17 g 11    Symbicort 160-4.5 MCG/ACT inhaler Inhale 2 puffs 2 (two) times a day Rinse mouth after use. (Patient not taking: Reported on 9/18/2024) 10.2 g 11    [DISCONTINUED] predniSONE 10 mg tablet Take with food. Take 4 tabs x 2 days, then 3 x 2 days, then 2 x 2 days, then 1 x 2 days. (Patient not taking: Reported on 9/18/2024) 20 tablet 1     No current facility-administered medications on file prior to visit.      Social History     Tobacco Use    Smoking status: Never    Smokeless tobacco: Never    Tobacco comments:     no known exposure to tobacco smoke   Vaping Use    Vaping status: Never Used   Substance and Sexual Activity    Alcohol use: Not Currently    Drug use: Never    Sexual activity: Not on file         Objective     /58 (BP Location: Left arm, Patient Position: Sitting, Cuff Size: Adult)   Pulse 100   Temp (!) 97.3 °F (36.3 °C) (Tympanic)   Resp 16   Ht 5' 6\" (1.676 m)   Wt 79.8 kg (176 lb)   SpO2 95%   BMI 28.41 kg/m²     Physical Exam  Constitutional:       General: She is not in acute distress.     Appearance: Normal appearance. She is well-developed. She is not ill-appearing, toxic-appearing or diaphoretic.   HENT:      Head: Normocephalic and atraumatic.      Right Ear: Tympanic membrane and ear canal normal. There is no impacted cerumen.      Left Ear: Tympanic membrane, ear canal and external ear normal. There is no impacted cerumen.      Nose: Congestion present.      Right Turbinates: Pale.      Left Turbinates: Pale.      Mouth/Throat:      Pharynx: Postnasal drip present. No oropharyngeal exudate or posterior oropharyngeal erythema.   Eyes:      Extraocular Movements: Extraocular movements intact.      Conjunctiva/sclera: Conjunctivae normal.      Pupils: Pupils are equal, round, and reactive to light.   Neck:      Thyroid: No thyromegaly.   Cardiovascular:      Rate and Rhythm: Normal rate and regular rhythm.      Pulses: no weak pulses.           " Dorsalis pedis pulses are 2+ on the right side and 2+ on the left side.      Heart sounds: Normal heart sounds. No murmur heard.  Pulmonary:      Effort: Pulmonary effort is normal. No respiratory distress.      Breath sounds: Normal breath sounds. No wheezing.   Abdominal:      General: Bowel sounds are normal. There is no distension.      Palpations: Abdomen is soft.      Tenderness: There is no abdominal tenderness.   Musculoskeletal:         General: Normal range of motion.      Cervical back: Normal range of motion and neck supple. No rigidity or tenderness.   Feet:      Right foot:      Skin integrity: No ulcer, skin breakdown, erythema, warmth, callus or dry skin.      Left foot:      Skin integrity: No ulcer, skin breakdown, erythema, warmth, callus or dry skin.   Lymphadenopathy:      Cervical: No cervical adenopathy.   Skin:     General: Skin is warm and dry.   Neurological:      General: No focal deficit present.      Mental Status: She is alert and oriented to person, place, and time.   Psychiatric:         Mood and Affect: Mood normal.         Behavior: Behavior normal.         Thought Content: Thought content normal.         Judgment: Judgment normal.     Patient's shoes and socks removed.    Right Foot/Ankle   Right Foot Inspection  Skin Exam: skin normal and skin intact. No dry skin, no warmth, no callus, no erythema, no maceration, no abnormal color, no pre-ulcer, no ulcer and no callus.     Toe Exam: ROM and strength within normal limits.     Sensory   Monofilament testing: intact    Vascular  Capillary refills: < 3 seconds  The right DP pulse is 2+.     Left Foot/Ankle  Left Foot Inspection  Skin Exam: skin normal and skin intact. No dry skin, no warmth, no erythema, no maceration, normal color, no pre-ulcer, no ulcer and no callus.     Toe Exam: ROM and strength within normal limits.     Sensory   Monofilament testing: intact    Vascular  Capillary refills: < 3 seconds  The left DP pulse is 2+.      Assign Risk Category  No deformity present  No loss of protective sensation  No weak pulses  Risk: 0

## 2024-09-18 NOTE — ASSESSMENT & PLAN NOTE
Follow up with Dr. Cummings   Pt is not taking symbicort due to cost- advised her to discuss this with Dr. Cummings and get a formulary list from her insurance for inhalers

## 2024-09-18 NOTE — ASSESSMENT & PLAN NOTE
Stable, continue Gabapentin   Avoid caffeine, stay hydrated, can try a weighted blanket   She has been intolerant of Requip in the past

## 2024-09-18 NOTE — ASSESSMENT & PLAN NOTE
Advised pt to follow up with Allergist, Dr. Cummings   Take medications for allergies as Dr. Cummings has instructed

## 2024-09-18 NOTE — ASSESSMENT & PLAN NOTE
Updated lipid panel ordered  Continue statin therapy     Orders:    atorvastatin (LIPITOR) 20 mg tablet; Take 1 tablet (20 mg total) by mouth daily

## 2024-09-18 NOTE — ASSESSMENT & PLAN NOTE
Lab Results   Component Value Date    HGBA1C 6.0 09/18/2024   Stable, continue Irbesartan-HCTZ  Follow a low sodium diet     Orders:    irbesartan-hydrochlorothiazide (AVALIDE) 150-12.5 MG per tablet; Take 1 tablet by mouth daily    Comprehensive metabolic panel; Future    CBC and differential; Future    TSH, 3rd generation with Free T4 reflex; Future

## 2024-11-17 ENCOUNTER — HOSPITAL ENCOUNTER (OUTPATIENT)
Dept: CT IMAGING | Facility: HOSPITAL | Age: 69
Discharge: HOME | End: 2024-11-17
Attending: OTOLARYNGOLOGY
Payer: MEDICARE

## 2024-11-17 DIAGNOSIS — J32.4 CHRONIC PANSINUSITIS: ICD-10-CM

## 2024-11-17 DIAGNOSIS — J33.9 NASAL POLYPOSIS: ICD-10-CM

## 2024-11-17 PROCEDURE — 70486 CT MAXILLOFACIAL W/O DYE: CPT

## 2024-12-14 DIAGNOSIS — E78.2 MIXED HYPERLIPIDEMIA: ICD-10-CM

## 2024-12-15 RX ORDER — ATORVASTATIN CALCIUM 20 MG/1
20 TABLET, FILM COATED ORAL DAILY
Qty: 90 TABLET | Refills: 0 | Status: SHIPPED | OUTPATIENT
Start: 2024-12-15

## 2024-12-19 ENCOUNTER — APPOINTMENT (OUTPATIENT)
Dept: LAB | Age: 69
End: 2024-12-19
Payer: MEDICARE

## 2024-12-19 ENCOUNTER — CONSULT (OUTPATIENT)
Dept: FAMILY MEDICINE CLINIC | Facility: CLINIC | Age: 69
End: 2024-12-19
Payer: MEDICARE

## 2024-12-19 VITALS
SYSTOLIC BLOOD PRESSURE: 120 MMHG | DIASTOLIC BLOOD PRESSURE: 78 MMHG | OXYGEN SATURATION: 95 % | TEMPERATURE: 97.2 F | BODY MASS INDEX: 28 KG/M2 | WEIGHT: 174.2 LBS | HEIGHT: 66 IN | HEART RATE: 98 BPM

## 2024-12-19 DIAGNOSIS — J30.1 CHRONIC SEASONAL ALLERGIC RHINITIS DUE TO POLLEN: ICD-10-CM

## 2024-12-19 DIAGNOSIS — Z01.818 PRE-OPERATIVE CLEARANCE: Primary | ICD-10-CM

## 2024-12-19 LAB
ALBUMIN SERPL BCG-MCNC: 4.6 G/DL (ref 3.5–5)
ALP SERPL-CCNC: 79 U/L (ref 34–104)
ALT SERPL W P-5'-P-CCNC: 13 U/L (ref 7–52)
ANION GAP SERPL CALCULATED.3IONS-SCNC: 12 MMOL/L (ref 4–13)
AST SERPL W P-5'-P-CCNC: 16 U/L (ref 13–39)
BASOPHILS # BLD AUTO: 0.08 THOUSANDS/ÂΜL (ref 0–0.1)
BASOPHILS NFR BLD AUTO: 1 % (ref 0–1)
BILIRUB SERPL-MCNC: 0.58 MG/DL (ref 0.2–1)
BUN SERPL-MCNC: 29 MG/DL (ref 5–25)
CALCIUM SERPL-MCNC: 9.5 MG/DL (ref 8.4–10.2)
CHLORIDE SERPL-SCNC: 100 MMOL/L (ref 96–108)
CO2 SERPL-SCNC: 23 MMOL/L (ref 21–32)
CREAT SERPL-MCNC: 1.16 MG/DL (ref 0.6–1.3)
EOSINOPHIL # BLD AUTO: 1.21 THOUSAND/ÂΜL (ref 0–0.61)
EOSINOPHIL NFR BLD AUTO: 14 % (ref 0–6)
ERYTHROCYTE [DISTWIDTH] IN BLOOD BY AUTOMATED COUNT: 12.2 % (ref 11.6–15.1)
GFR SERPL CREATININE-BSD FRML MDRD: 48 ML/MIN/1.73SQ M
GLUCOSE P FAST SERPL-MCNC: 81 MG/DL (ref 65–99)
HCT VFR BLD AUTO: 39.4 % (ref 34.8–46.1)
HGB BLD-MCNC: 12.8 G/DL (ref 11.5–15.4)
IMM GRANULOCYTES # BLD AUTO: 0.02 THOUSAND/UL (ref 0–0.2)
IMM GRANULOCYTES NFR BLD AUTO: 0 % (ref 0–2)
LYMPHOCYTES # BLD AUTO: 2.44 THOUSANDS/ÂΜL (ref 0.6–4.47)
LYMPHOCYTES NFR BLD AUTO: 27 % (ref 14–44)
MCH RBC QN AUTO: 30.9 PG (ref 26.8–34.3)
MCHC RBC AUTO-ENTMCNC: 32.5 G/DL (ref 31.4–37.4)
MCV RBC AUTO: 95 FL (ref 82–98)
MONOCYTES # BLD AUTO: 0.85 THOUSAND/ÂΜL (ref 0.17–1.22)
MONOCYTES NFR BLD AUTO: 10 % (ref 4–12)
NEUTROPHILS # BLD AUTO: 4.3 THOUSANDS/ÂΜL (ref 1.85–7.62)
NEUTS SEG NFR BLD AUTO: 48 % (ref 43–75)
NRBC BLD AUTO-RTO: 0 /100 WBCS
PLATELET # BLD AUTO: 293 THOUSANDS/UL (ref 149–390)
PMV BLD AUTO: 10.3 FL (ref 8.9–12.7)
POTASSIUM SERPL-SCNC: 4.2 MMOL/L (ref 3.5–5.3)
PROT SERPL-MCNC: 7.2 G/DL (ref 6.4–8.4)
RBC # BLD AUTO: 4.14 MILLION/UL (ref 3.81–5.12)
SODIUM SERPL-SCNC: 135 MMOL/L (ref 135–147)
WBC # BLD AUTO: 8.9 THOUSAND/UL (ref 4.31–10.16)

## 2024-12-19 PROCEDURE — 80053 COMPREHEN METABOLIC PANEL: CPT

## 2024-12-19 PROCEDURE — 99214 OFFICE O/P EST MOD 30 MIN: CPT

## 2024-12-19 PROCEDURE — 36415 COLL VENOUS BLD VENIPUNCTURE: CPT

## 2024-12-19 PROCEDURE — 93000 ELECTROCARDIOGRAM COMPLETE: CPT

## 2024-12-19 PROCEDURE — 85025 COMPLETE CBC W/AUTO DIFF WBC: CPT

## 2024-12-19 NOTE — PROGRESS NOTES
Pre-operative Clearance  Name: Judit Sawyer      : 1955      MRN: [id number]  Encounter Provider: AARTI James  Encounter Date: 2024   Encounter department: [facility]    Assessment & Plan  Pre-operative clearance  Pt will be undergoing sinus surgery on 25 with Dr Blackman. ECG in office with long QT, normal sinus rhythm pt has no cardiac disorders, hx of PVC denies chest pain or SOB. Suspect secondary to SSRI and thiazide diuretic use. BP stable, no sxs of active infection. Reviewed medications and allergies, will check electrolytes  pt to complete labs today 24 ad may proceed with surgery as planned pending normal labs.   Addendum 24: Reviewed CMP, CBC pt may proceed with surgery as planned  Orders:    POCT ECG    Comprehensive metabolic panel    CBC and differential    Chronic seasonal allergic rhinitis due to pollen  Pt scheduled for sinus surgery 25       Pre-operative Clearance:     Revised Cardiac Risk Index:  RCI RISK CLASS I (0 risk factors, risk of major cardiac complications approximately 0.5%)    Clearance:  Patient is medically optimized (CLEARED) for proposed surgery without any additional cardiac testing.      Medication Instructions:   - Avoid herbs or non-directed vitamins one week prior to surgery    - Avoid aspirin containing medications or non-steroidal anti-inflammatory drugs one week preceding surgery    - May take tylenol for pain up until the night before surgery    - ACE Inhibitors or ARBs: Continue this medication up to the evening before surgery/procedure, but do not take the morning of the day of surgery.  - Antidepressants: Continue to take this medication on your normal schedule.  - Antiepileptic meds: Continue to take this medication on your normal schedule.  - Corticosteroids: Continue to take this medication on your normal schedule.  - H2 Blocker: Continue to take this medication on your normal  schedule.  - Hyperlipidemia meds: Continue to take this medication on your normal schedule.  - Leukotriene Inhibitor (ie, montelukast, zafirlucast): Continue to take this medication on your normal schedule.      Medicine Instructions for Adults with Diabetes (NO Bowel Prep)    Follow these instructions when a BOWEL PREP is NOT required for your procedure or surgery!    NOTE:  GLP Agonists taken weekly: do not take in the 7 days before your procedure. **Bariatric surgery: do not take 4 weeks prior to your procedure.    SGLT-2 Inhibitors: do not take in the 4 days before your procedure    On the Day Before Surgery/Procedure  If you are having a procedure (e.g., Colonoscopy) or surgery which DOES NOT require a bowel prep, follow the directions below based on the type of medicine you take for your diabetes.  Type of Medicine You Take Examples What to Do   Pre-Mixed Insulin Intermediate  Xdgqlli66/25, Hsdexvl40/30, Novolog 70/30, Regular Insulin Take 1/2 your regular dose the evening before our procedure.   Rapid/Fast Acting  Insulin and/or Long-Acting Insulin Humalog U200, NovoLog, Apidra,  Lantus, Levemir, Tresiba, Toujeo,  Fias, Basaglar Take your FULL regular dose the day before procedure.   Oral Diabetic Medicines (sulfonylurea) Glipizide/Glimepiride/  Glucotrol Take your regular dose with dinner the evening before your procedure.   Other Oral Diabetic Medicines Metformin, Glucophage, Glucophage  XR, Riomet, Glumetza, Actose,  Avandia, Gl set, Prandin Take your regular dose with dinner the evening before your procedure   GLP Agonists Adlyxin, Byetta, Bydureon,  Ozempic, Soliqua, Tanzeum,  Trulicity, Victoza, Saxenda,  Rybelsus, Wegovy, Mounjaro, Zepbound If taken daily, take as normal  If taken weekly, do not take this medicine for 7 days before your procedure including the day of the procedure (resume taking after the procedure). **Bariatric surgery: do not take 4 weeks prior to procedure   SGLT-2 Inhibitors  Jardiance, Invokana, Farxiga, Steglatro, Brenzavvy, Qtern, Segluromet Glyxambi, Synjardy, Synjardy XR, Invokamet, InvokametXR, Trijary XR, Xigduo X Do not take for 4 days before your procedure including the day of the procedure (resume taking after the procedure)   This educational material has been approved by the Patient Education Advisory Committee.    On the Day of Surgery/Procedure  Follow the directions below based on the type of medicine you take for your diabetes.  Type of Medicine You Take  Examples What to Do   Long-Acting Insulin Lantus, Levemir, Tresiba,  Toujeo, Basaglar, Semglee If you normally take your Long Acting Insulin in the morning, take the full dose as scheduled.   GLP-I Agonists Adlyxin, Byetta, Bydureon,  Ozempic, Soliqua, Tanzeum,  Trulicity, Victoza, Saxenda,  Rybelsus, Mounjaro Do NOT take this medicine on the day of your procedure (resume taking after the procedure)   Except for the morning Long-Acting Insulin, DO NOT take ANY diabetic medicine on the day of your procedure unless you were instructed by the doctor who manages your diabetes medicines.  Continue to check your blood sugars.  If you have an insulin pump, ask your endocrinologist for instructions at least 3 days before your procedure. NOTE: If you are not able to ask your endocrinologist in advance, on the day of the procedure set your insulin pump to your basal rate only. Bring your insulin pump supplies to the hospital.         History of Present Illness     Pt will be undergoing sinus surgery on 1/8/25 with Dr Blackman.       Review of Systems   Constitutional:  Negative for activity change, appetite change, chills, diaphoresis, fatigue and fever.   HENT:  Negative for congestion, drooling, ear pain, hearing loss, nosebleeds, postnasal drip, rhinorrhea, sinus pressure, sinus pain, sore throat, trouble swallowing and voice change.    Eyes:  Negative for photophobia, pain, discharge, redness and visual disturbance.    Respiratory:  Negative for cough, chest tightness, shortness of breath and wheezing.    Cardiovascular:  Negative for chest pain, palpitations and leg swelling.   Gastrointestinal:  Negative for abdominal distention, abdominal pain, blood in stool, constipation, diarrhea, nausea, rectal pain and vomiting.   Endocrine: Negative for cold intolerance, heat intolerance, polydipsia, polyphagia and polyuria.   Genitourinary:  Negative for decreased urine volume, difficulty urinating, dysuria, flank pain, genital sores, hematuria, menstrual problem, pelvic pain, urgency, vaginal discharge and vaginal pain.   Musculoskeletal:  Negative for arthralgias, back pain, gait problem, myalgias, neck pain and neck stiffness.   Skin:  Negative for color change, rash and wound.   Allergic/Immunologic: Negative for environmental allergies, food allergies and immunocompromised state.   Neurological:  Negative for dizziness, tremors, seizures, syncope, facial asymmetry, weakness, light-headedness and numbness.   Hematological:  Negative for adenopathy.   Psychiatric/Behavioral:  Negative for agitation, behavioral problems, confusion, decreased concentration, dysphoric mood, hallucinations, self-injury, sleep disturbance and suicidal ideas. The patient is not nervous/anxious and is not hyperactive.    All other systems reviewed and are negative.    Past Medical History   Past Medical History:   Diagnosis Date    Chronic cough 05/15/2018    Colon polyp     Diabetes mellitus (HCC)     Eczema     Gallbladder disease     Hyperlipidemia     Hypertension     Insomnia     Nasal polyps 2024     Past Surgical History:   Procedure Laterality Date    CHOLECYSTECTOMY      COLONOSCOPY      NASAL SEPTUM SURGERY      nasal septal deviation repair    TX ESOPHAGOGASTRODUODENOSCOPY TRANSORAL DIAGNOSTIC N/A 2/25/2016    Procedure: EGD AND COLONOSCOPY;  Surgeon: Kenny Irving MD;  Location: [facility];  Service: Gastroenterology     Family History   Problem  Relation Age of Onset    Heart attack Father         acute MI    Cancer Sister         R/t Mad Cow disease    Neuropathy Mother     Emphysema Sister     No Known Problems Sister     No Known Problems Sister      Social History     Tobacco Use    Smoking status: Never    Smokeless tobacco: Never    Tobacco comments:     no known exposure to tobacco smoke   Vaping Use    Vaping status: Never Used   Substance and Sexual Activity    Alcohol use: Not Currently    Drug use: Never    Sexual activity: Yes     Partners: Male     Birth control/protection: None     Current Outpatient Medications on File Prior to Visit   Medication Sig    albuterol (Ventolin HFA) 90 mcg/act inhaler Inhale 2 puffs every 6 (six) hours as needed for wheezing    atorvastatin (LIPITOR) 20 mg tablet TAKE 1 TABLET BY MOUTH EVERY DAY    azelastine (ASTELIN) 0.1 % nasal spray 2 sprays into each nostril daily Use in each nostril as directed    Budeson-Glycopyrrol-Formoterol (Breztri Aerosphere) 160-9-4.8 MCG/ACT AERO Inhale 2 puffs 2 (two) times a day Rinse mouth after use.    calcium-vitamin D (OSCAL) 250-125 MG-UNIT per tablet Take 1 tablet by mouth daily.    famotidine (PEPCID) 40 MG tablet take 1 tablet by mouth daily at bedtime AS NEEDED FOR HEARTBURN    gabapentin (NEURONTIN) 300 mg capsule Take 2 capsules (600 mg total) by mouth daily at bedtime    irbesartan-hydrochlorothiazide (AVALIDE) 150-12.5 MG per tablet Take 1 tablet by mouth daily    metFORMIN (GLUCOPHAGE) 1000 MG tablet Take 1 tablet (1,000 mg total) by mouth 2 (two) times a day with meals    Mometasone Furoate POWD Dissolve 1 mg in Neilmed Sinus rinse bottle and irrigate sinuses twice daily. 90 day supply    montelukast (SINGULAIR) 10 mg tablet take 1 tablet by mouth once daily    Multiple Vitamin (MULTIVITAMIN) tablet Take 1 tablet by mouth daily.    predniSONE 10 mg tablet Take 1 tablet (10 mg total) by mouth daily 60 mg x 4 days; 40 mg x 4 days; 20 mg x 4 days; 10 mg x 4 days     "sertraline (ZOLOFT) 100 mg tablet take 1 tablet by mouth once daily     Allergies   Allergen Reactions    Cephalexin      Annotation - [id number]: n/v     Objective   /78 (BP Location: Right arm, Patient Position: Sitting, Cuff Size: Standard)   Pulse 98   Temp (!) 97.2 °F (36.2 °C) (Tympanic)   Ht 5' 6\" (1.676 m)   Wt 79 kg (174 lb 3.2 oz)   SpO2 95%   BMI 28.12 kg/m²     Physical Exam  Vitals and nursing note reviewed.   Constitutional:       General: She is not in acute distress.     Appearance: Normal appearance. She is not ill-appearing, toxic-appearing or diaphoretic.   HENT:      Head: Normocephalic and atraumatic.      Right Ear: Tympanic membrane, ear canal and external ear normal. There is no impacted cerumen.      Left Ear: Tympanic membrane, ear canal and external ear normal. There is no impacted cerumen.      Nose: Nose normal. No congestion or rhinorrhea.      Mouth/Throat:      Mouth: Mucous membranes are moist.      Pharynx: Oropharynx is clear. No oropharyngeal exudate or posterior oropharyngeal erythema.   Eyes:      General: No scleral icterus.        Right eye: No discharge.         Left eye: No discharge.      Extraocular Movements: Extraocular movements intact.      Conjunctiva/sclera: Conjunctivae normal.      Pupils: Pupils are equal, round, and reactive to light.   Neck:      Vascular: No carotid bruit.   Cardiovascular:      Rate and Rhythm: Normal rate and regular rhythm.      Pulses: Normal pulses.      Heart sounds: Normal heart sounds. No murmur heard.  Pulmonary:      Effort: Pulmonary effort is normal. No respiratory distress.      Breath sounds: Normal breath sounds. No stridor. No wheezing, rhonchi or rales.   Chest:      Chest wall: No tenderness.   Abdominal:      General: Bowel sounds are normal. There is no distension.      Palpations: Abdomen is soft. There is no mass.      Tenderness: There is no abdominal tenderness. There is no right CVA tenderness, left CVA " tenderness, guarding or rebound.      Hernia: No hernia is present.   Musculoskeletal:         General: No swelling, tenderness, deformity or signs of injury. Normal range of motion.      Cervical back: Normal range of motion and neck supple. No rigidity or tenderness.      Right lower leg: No edema.      Left lower leg: No edema.   Lymphadenopathy:      Cervical: No cervical adenopathy.   Skin:     General: Skin is warm.      Capillary Refill: Capillary refill takes less than 2 seconds.      Coloration: Skin is not jaundiced or pale.      Findings: No bruising, erythema, lesion or rash.   Neurological:      General: No focal deficit present.      Mental Status: She is alert and oriented to person, place, and time.      Cranial Nerves: No cranial nerve deficit.      Sensory: No sensory deficit.      Motor: No weakness.      Coordination: Coordination normal.      Gait: Gait normal.      Deep Tendon Reflexes: Reflexes normal.   Psychiatric:         Mood and Affect: Mood normal.         Behavior: Behavior normal.         Thought Content: Thought content normal.         Judgment: Judgment normal.           AARTI James

## 2024-12-20 ENCOUNTER — RESULTS FOLLOW-UP (OUTPATIENT)
Dept: FAMILY MEDICINE CLINIC | Facility: CLINIC | Age: 69
End: 2024-12-20

## 2025-01-07 DIAGNOSIS — F41.9 ANXIETY: ICD-10-CM

## 2025-01-08 PROCEDURE — 88304 TISSUE EXAM BY PATHOLOGIST: CPT | Performed by: STUDENT IN AN ORGANIZED HEALTH CARE EDUCATION/TRAINING PROGRAM

## 2025-01-08 RX ORDER — SERTRALINE HYDROCHLORIDE 100 MG/1
100 TABLET, FILM COATED ORAL DAILY
Qty: 30 TABLET | Refills: 5 | Status: SHIPPED | OUTPATIENT
Start: 2025-01-08

## 2025-01-09 ENCOUNTER — LAB REQUISITION (OUTPATIENT)
Dept: LAB | Facility: HOSPITAL | Age: 70
End: 2025-01-09
Payer: MEDICARE

## 2025-01-09 DIAGNOSIS — J32.4 CHRONIC PANSINUSITIS: ICD-10-CM

## 2025-01-14 PROCEDURE — 88304 TISSUE EXAM BY PATHOLOGIST: CPT | Performed by: STUDENT IN AN ORGANIZED HEALTH CARE EDUCATION/TRAINING PROGRAM

## 2025-01-23 DIAGNOSIS — E11.8 TYPE 2 DIABETES MELLITUS WITH COMPLICATION, WITHOUT LONG-TERM CURRENT USE OF INSULIN (HCC): ICD-10-CM

## 2025-01-31 DIAGNOSIS — G25.81 RESTLESS LEGS SYNDROME: ICD-10-CM

## 2025-01-31 RX ORDER — GABAPENTIN 300 MG/1
600 CAPSULE ORAL
Qty: 180 CAPSULE | Refills: 1 | Status: SHIPPED | OUTPATIENT
Start: 2025-01-31

## 2025-01-31 NOTE — TELEPHONE ENCOUNTER
Reason for call:   [x] Refill   [] Prior Auth  [] Other:     Office:   [x] PCP/Provider - [facility]   [] Specialty/Provider -     Medication: gabapentin (NEURONTIN) 300 mg capsule     Dose/Frequency: 600 mg, Daily at bedtime     Quantity: 180    Pharmacy: [id number]    Does the patient have enough for 3 days?   [] Yes   [x] No - Send as HP to POD

## 2025-03-13 DIAGNOSIS — E11.59 HYPERTENSION ASSOCIATED WITH DIABETES  (HCC): ICD-10-CM

## 2025-03-13 DIAGNOSIS — I15.2 HYPERTENSION ASSOCIATED WITH DIABETES  (HCC): ICD-10-CM

## 2025-03-14 RX ORDER — IRBESARTAN AND HYDROCHLOROTHIAZIDE 150; 12.5 MG/1; MG/1
1 TABLET, FILM COATED ORAL DAILY
Qty: 90 TABLET | Refills: 1 | Status: SHIPPED | OUTPATIENT
Start: 2025-03-14

## 2025-03-18 DIAGNOSIS — E78.2 MIXED HYPERLIPIDEMIA: ICD-10-CM

## 2025-03-18 NOTE — TELEPHONE ENCOUNTER
Reason for call:   [x] Refill   [] Prior Auth  [] Other:     Office:   [x] PCP/Provider -   [] Specialty/Provider -     Medication:     atorvastatin (LIPITOR) 20 mg TAKE 1 TABLET BY MOUTH EVERY DAY          Pharmacy: [facility]/pharmacy #[id number] - [place], [place]     Local Pharmacy   Does the patient have enough for 3 days?   [x] Yes   [] No - Send as HP to POD    Mail Away Pharmacy   Does the patient have enough for 10 days?   [] Yes   [] No - Send as HP to POD

## 2025-03-19 RX ORDER — ATORVASTATIN CALCIUM 20 MG/1
20 TABLET, FILM COATED ORAL DAILY
Qty: 90 TABLET | Refills: 1 | Status: SHIPPED | OUTPATIENT
Start: 2025-03-19

## 2025-04-11 DIAGNOSIS — F41.9 ANXIETY: ICD-10-CM

## 2025-04-11 RX ORDER — SERTRALINE HYDROCHLORIDE 100 MG/1
100 TABLET, FILM COATED ORAL DAILY
Qty: 90 TABLET | Refills: 2 | Status: SHIPPED | OUTPATIENT
Start: 2025-04-11

## 2025-04-26 ENCOUNTER — APPOINTMENT (OUTPATIENT)
Dept: LAB | Age: 70
End: 2025-04-26
Payer: MEDICARE

## 2025-04-26 DIAGNOSIS — I15.2 HYPERTENSION ASSOCIATED WITH DIABETES  (HCC): ICD-10-CM

## 2025-04-26 DIAGNOSIS — E11.59 HYPERTENSION ASSOCIATED WITH DIABETES  (HCC): ICD-10-CM

## 2025-04-26 DIAGNOSIS — E11.9 TYPE 2 DIABETES MELLITUS WITHOUT COMPLICATION, WITHOUT LONG-TERM CURRENT USE OF INSULIN (HCC): ICD-10-CM

## 2025-04-26 LAB
ALBUMIN SERPL BCG-MCNC: 4.3 G/DL (ref 3.5–5)
ALP SERPL-CCNC: 89 U/L (ref 34–104)
ALT SERPL W P-5'-P-CCNC: 16 U/L (ref 7–52)
ANION GAP SERPL CALCULATED.3IONS-SCNC: 11 MMOL/L (ref 4–13)
AST SERPL W P-5'-P-CCNC: 18 U/L (ref 13–39)
BASOPHILS # BLD AUTO: 0.06 THOUSANDS/ÂΜL (ref 0–0.1)
BASOPHILS NFR BLD AUTO: 1 % (ref 0–1)
BILIRUB SERPL-MCNC: 0.45 MG/DL (ref 0.2–1)
BUN SERPL-MCNC: 20 MG/DL (ref 5–25)
CALCIUM SERPL-MCNC: 9.4 MG/DL (ref 8.4–10.2)
CHLORIDE SERPL-SCNC: 104 MMOL/L (ref 96–108)
CHOLEST SERPL-MCNC: 157 MG/DL (ref ?–200)
CO2 SERPL-SCNC: 25 MMOL/L (ref 21–32)
CREAT SERPL-MCNC: 0.95 MG/DL (ref 0.6–1.3)
EOSINOPHIL # BLD AUTO: 1.02 THOUSAND/ÂΜL (ref 0–0.61)
EOSINOPHIL NFR BLD AUTO: 14 % (ref 0–6)
ERYTHROCYTE [DISTWIDTH] IN BLOOD BY AUTOMATED COUNT: 12.1 % (ref 11.6–15.1)
EST. AVERAGE GLUCOSE BLD GHB EST-MCNC: 120 MG/DL
GFR SERPL CREATININE-BSD FRML MDRD: 62 ML/MIN/1.73SQ M
GLUCOSE P FAST SERPL-MCNC: 90 MG/DL (ref 65–99)
HBA1C MFR BLD: 5.8 %
HCT VFR BLD AUTO: 38.3 % (ref 34.8–46.1)
HDLC SERPL-MCNC: 45 MG/DL
HGB BLD-MCNC: 12.1 G/DL (ref 11.5–15.4)
IMM GRANULOCYTES # BLD AUTO: 0.03 THOUSAND/UL (ref 0–0.2)
IMM GRANULOCYTES NFR BLD AUTO: 0 % (ref 0–2)
LDLC SERPL CALC-MCNC: 84 MG/DL (ref 0–100)
LYMPHOCYTES # BLD AUTO: 1.95 THOUSANDS/ÂΜL (ref 0.6–4.47)
LYMPHOCYTES NFR BLD AUTO: 26 % (ref 14–44)
MCH RBC QN AUTO: 31.6 PG (ref 26.8–34.3)
MCHC RBC AUTO-ENTMCNC: 31.6 G/DL (ref 31.4–37.4)
MCV RBC AUTO: 100 FL (ref 82–98)
MONOCYTES # BLD AUTO: 0.84 THOUSAND/ÂΜL (ref 0.17–1.22)
MONOCYTES NFR BLD AUTO: 11 % (ref 4–12)
NEUTROPHILS # BLD AUTO: 3.52 THOUSANDS/ÂΜL (ref 1.85–7.62)
NEUTS SEG NFR BLD AUTO: 48 % (ref 43–75)
NRBC BLD AUTO-RTO: 0 /100 WBCS
PLATELET # BLD AUTO: 256 THOUSANDS/UL (ref 149–390)
PMV BLD AUTO: 10.4 FL (ref 8.9–12.7)
POTASSIUM SERPL-SCNC: 4.4 MMOL/L (ref 3.5–5.3)
PROT SERPL-MCNC: 6.8 G/DL (ref 6.4–8.4)
RBC # BLD AUTO: 3.83 MILLION/UL (ref 3.81–5.12)
SODIUM SERPL-SCNC: 140 MMOL/L (ref 135–147)
T4 FREE SERPL-MCNC: 0.79 NG/DL (ref 0.61–1.12)
TRIGL SERPL-MCNC: 139 MG/DL (ref ?–150)
TSH SERPL DL<=0.05 MIU/L-ACNC: 5.35 UIU/ML (ref 0.45–4.5)
WBC # BLD AUTO: 7.42 THOUSAND/UL (ref 4.31–10.16)

## 2025-04-26 PROCEDURE — 85025 COMPLETE CBC W/AUTO DIFF WBC: CPT

## 2025-04-26 PROCEDURE — 36415 COLL VENOUS BLD VENIPUNCTURE: CPT

## 2025-04-26 PROCEDURE — 84443 ASSAY THYROID STIM HORMONE: CPT

## 2025-04-26 PROCEDURE — 83036 HEMOGLOBIN GLYCOSYLATED A1C: CPT

## 2025-04-26 PROCEDURE — 80061 LIPID PANEL: CPT

## 2025-04-26 PROCEDURE — 84439 ASSAY OF FREE THYROXINE: CPT

## 2025-04-26 PROCEDURE — 80053 COMPREHEN METABOLIC PANEL: CPT

## 2025-04-28 ENCOUNTER — RESULTS FOLLOW-UP (OUTPATIENT)
Dept: FAMILY MEDICINE CLINIC | Facility: CLINIC | Age: 70
End: 2025-04-28

## 2025-04-30 ENCOUNTER — OFFICE VISIT (OUTPATIENT)
Dept: FAMILY MEDICINE CLINIC | Facility: CLINIC | Age: 70
End: 2025-04-30
Payer: MEDICARE

## 2025-04-30 VITALS
SYSTOLIC BLOOD PRESSURE: 123 MMHG | DIASTOLIC BLOOD PRESSURE: 62 MMHG | HEIGHT: 66 IN | HEART RATE: 100 BPM | WEIGHT: 173.4 LBS | RESPIRATION RATE: 18 BRPM | TEMPERATURE: 97 F | OXYGEN SATURATION: 96 % | BODY MASS INDEX: 27.87 KG/M2

## 2025-04-30 DIAGNOSIS — J41.1 CHRONIC BRONCHITIS, MUCOPURULENT (HCC): ICD-10-CM

## 2025-04-30 DIAGNOSIS — J45.30 MILD PERSISTENT ASTHMA WITHOUT COMPLICATION: ICD-10-CM

## 2025-04-30 DIAGNOSIS — E78.2 MIXED HYPERLIPIDEMIA: ICD-10-CM

## 2025-04-30 DIAGNOSIS — E11.9 TYPE 2 DIABETES MELLITUS WITHOUT COMPLICATION, WITHOUT LONG-TERM CURRENT USE OF INSULIN (HCC): Primary | ICD-10-CM

## 2025-04-30 DIAGNOSIS — E11.59 HYPERTENSION ASSOCIATED WITH DIABETES  (HCC): ICD-10-CM

## 2025-04-30 DIAGNOSIS — Z00.00 WELCOME TO MEDICARE PREVENTIVE VISIT: ICD-10-CM

## 2025-04-30 DIAGNOSIS — F41.9 ANXIETY: ICD-10-CM

## 2025-04-30 DIAGNOSIS — G25.81 RESTLESS LEGS SYNDROME: ICD-10-CM

## 2025-04-30 DIAGNOSIS — I15.2 HYPERTENSION ASSOCIATED WITH DIABETES  (HCC): ICD-10-CM

## 2025-04-30 PROCEDURE — G0402 INITIAL PREVENTIVE EXAM: HCPCS | Performed by: NURSE PRACTITIONER

## 2025-04-30 PROCEDURE — 99214 OFFICE O/P EST MOD 30 MIN: CPT | Performed by: NURSE PRACTITIONER

## 2025-04-30 PROCEDURE — G2211 COMPLEX E/M VISIT ADD ON: HCPCS | Performed by: NURSE PRACTITIONER

## 2025-04-30 RX ORDER — TACROLIMUS 1 MG/G
OINTMENT TOPICAL
COMMUNITY
Start: 2025-02-26

## 2025-04-30 NOTE — PROGRESS NOTES
Name: Judit Sawyer      : 1955      MRN: [id number]  Encounter Provider: AARTI Smith  Encounter Date: 2025   Encounter department: [facility]  :  Assessment & Plan  Welcome to Medicare preventive visit         Type 2 diabetes mellitus without complication, without long-term current use of insulin (HCC)    Lab Results   Component Value Date    HGBA1C 5.8 (H) 2025     Stable with A1C at 5.8  Continue metformin 1000mg daily  She is on statin therapy   Eye exam with [facility] (note is in Media)  Foot exam is UTD       Orders:    CBC and differential; Future    Comprehensive metabolic panel; Future    Hemoglobin A1C; Future    Albumin / creatinine urine ratio; Future      Hypertension associated with diabetes  (HCC)    Lab Results   Component Value Date    HGBA1C 5.8 (H) 2025   Stable, continue Irbesartan-HCTZ  Follow a low sodium diet     Orders:    TSH, 3rd generation with Free T4 reflex; Future    Lipid panel; Future      Chronic bronchitis, mucopurulent (HCC)  Stable and being managed by ENT         Mixed hyperlipidemia  Stable, continue Atorvastatin     Orders:    TSH, 3rd generation with Free T4 reflex; Future      Mild persistent asthma without complication  Stable and managed by Allergist, Dr. Cummings   Continue Albuterol rescue inhaler prn  No longer on maintenance inhaler     Orders:    TSH, 3rd generation with Free T4 reflex; Future      Anxiety  Stable, continue Zoloft            Restless legs syndrome  Stable, continue Gabapentin   Avoid caffeine, stay hydrated, can try a weighted blanket   She has been intolerant of Requip in the past              Depression Screening and Follow-up Plan: Patient was screened for depression during today's encounter. They screened negative with a PHQ-2 score of 0.        Preventive health issues were discussed with patient, and age appropriate screening tests were ordered as noted in patient's After  Visit Summary. Personalized health advice and appropriate referrals for health education or preventive services given if needed, as noted in patient's After Visit Summary.    History of Present Illness     HPI     Pt presents by herself today for a routine follow up and AWV  Doing well overall, no acute concerns today     Follows with Dr. Blackman, ENT for chronic sinusitis.  She did have functional endoscopic sinus surgery with Dr. Blackman 1/8/25 and has been feeling better following this.  She still has some PND but notes this has improved overall.  She will be seeing ENT next week for follow up    Taking Gabapentin 600 mg at HS for RLS. She feels this increased dosage does help more     Taking Zoloft for anxiety and she feels her mood has improved since retiring.  She does not want anything for prn use.  Denies depression.   is very supportive      DMT2- taking Metformin 1000mg daily.  A1C at 5.8, previously 6.0  Eye exams are with Osmani Holder, this is UTD  Foot exam is also UTD     Lab work completed 4/26/25:  Sodium 140, potassium 4.4  BUN 20, Cr 0.95, eGFR 62  / / HDL 45/ LDL 84-- she is on statin therapy   TSH 5.351, free T4 normal at 0.79-- denies symptoms of hypothyroidism     Colonoscopy completed 4/27/2022, recall due 4/2027  Mammogram UTD from 2/7/2025  DEXA scan is overdue and she does have an order for this to be scheduled       Patient Care Team:  AARTI Smith as PCP - General (Family Medicine)  MD Kenny Millard MD as Endoscopist    Review of Systems   Constitutional:  Negative for activity change, appetite change, chills, diaphoresis, fatigue, fever and unexpected weight change.   HENT:  Positive for postnasal drip.    Eyes:  Negative for visual disturbance.   Respiratory:  Negative for cough, chest tightness, shortness of breath and wheezing.    Cardiovascular:  Negative for chest pain, palpitations and leg swelling.   Gastrointestinal:  Negative for  abdominal pain, blood in stool, constipation, diarrhea and nausea.   Genitourinary:  Negative for dysuria.   Musculoskeletal:  Negative for arthralgias and myalgias.   Skin:  Negative for rash and wound.   Neurological:  Negative for dizziness, weakness, numbness and headaches.   Psychiatric/Behavioral:  Negative for dysphoric mood, self-injury, sleep disturbance and suicidal ideas. The patient is not nervous/anxious.      Medical History Reviewed by provider this encounter:  [facility]       Annual Wellness Visit Questionnaire   Judit is here for her Welcome to Medicare visit.     Health Risk Assessment:   Patient rates overall health as good. Patient feels that their physical health rating is same. Patient is satisfied with their life. Eyesight was rated as same. Hearing was rated as same. Patient feels that their emotional and mental health rating is same. Patients states they are never, rarely angry. Patient states they are never, rarely unusually tired/fatigued. Pain experienced in the last 7 days has been none. Patient states that she has experienced no weight loss or gain in last 6 months.     Depression Screening:   PHQ-2 Score: 0      Urinary Incontinence Screening:   Patient has not leaked urine accidently in the last six months.     Home Safety:  Patient does not have trouble with stairs inside or outside of their home. Patient has working smoke alarms and has working carbon monoxide detector. Home safety hazards include: none.     Nutrition:   Current diet is Regular.     Medications:   Patient is currently taking over-the-counter supplements. OTC medications include: see medication list. Patient is able to manage medications.     Activities of Daily Living (ADLs)/Instrumental Activities of Daily Living (IADLs):   Walk and transfer into and out of bed and chair?: Yes  Dress and groom yourself?: Yes    Bathe or shower yourself?: Yes    Feed yourself? Yes  Do your laundry/housekeeping?: Yes  Manage your money,  pay your bills and track your expenses?: Yes  Make your own meals?: Yes    Do your own shopping?: Yes    Previous Hospitalizations:   Any hospitalizations or ED visits within the last 12 months?: No      Advance Care Planning:   Living will: Yes    Advanced directive: Yes    Five wishes given: No      Cognitive Screening:   Provider or family/friend/caregiver concerned regarding cognition?: No    Preventive Screenings      Cardiovascular Screening:    General: Screening Current      Diabetes Screening:     General: Screening Current and History Diabetes      Colorectal Cancer Screening:     General: Screening Current      Breast Cancer Screening:     General: Screening Current      Cervical Cancer Screening:    General: Screening Not Indicated      Osteoporosis Screening:    General: Risks and Benefits Discussed    Due for: Bone Density Ultrasound      Abdominal Aortic Aneurysm (AAA) Screening:        General: Screening Not Indicated      Lung Cancer Screening:     General: Screening Not Indicated      Hepatitis C Screening:    General: Screening Current    Screening, Brief Intervention, and Referral to Treatment (SBIRT)     Screening  Typical number of drinks in a day: 0  Typical number of drinks in a week: 0  Interpretation: Low risk drinking behavior.    Single Item Drug Screening:  How often have you used an illegal drug (including marijuana) or a prescription medication for non-medical reasons in the past year? never    Single Item Drug Screen Score: 0  Interpretation: Negative screen for possible drug use disorder    Social Drivers of Health     Financial Resource Strain: Low Risk  (2/26/2020)    Overall Financial Resource Strain (CARDIA)     Difficulty of Paying Living Expenses: Not hard at all   Food Insecurity: No Food Insecurity (4/30/2025)    Hunger Vital Sign     Worried About Running Out of Food in the Last Year: Never true     Ran Out of Food in the Last Year: Never true   Transportation Needs: No  "Transportation Needs (4/30/2025)    PRAPARE - Transportation     Lack of Transportation (Medical): No     Lack of Transportation (Non-Medical): No   Housing Stability: Unknown (4/30/2025)    Housing Stability Vital Sign     Unable to Pay for Housing in the Last Year: No     Homeless in the Last Year: No   Utilities: Not At Risk (4/30/2025)    [facility] Utilities     Threatened with loss of utilities: No     Vision Screening    Right eye Left eye Both eyes   Without correction      With correction 20/30 20/50 20/50       Objective   /62 (BP Location: Left arm, Patient Position: Sitting, Cuff Size: Large)   Pulse 100   Temp (!) 97 °F (36.1 °C) (Temporal)   Resp 18   Ht 5' 6\" (1.676 m)   Wt 78.7 kg (173 lb 6.4 oz)   SpO2 96%   BMI 27.99 kg/m²     Physical Exam  Constitutional:       General: She is not in acute distress.     Appearance: Normal appearance. She is well-developed. She is not ill-appearing, toxic-appearing or diaphoretic.   HENT:      Head: Normocephalic and atraumatic.   Eyes:      Extraocular Movements: Extraocular movements intact.      Conjunctiva/sclera: Conjunctivae normal.      Pupils: Pupils are equal, round, and reactive to light.   Neck:      Thyroid: No thyromegaly.   Cardiovascular:      Rate and Rhythm: Normal rate and regular rhythm.      Heart sounds: Normal heart sounds. No murmur heard.  Pulmonary:      Effort: Pulmonary effort is normal. No respiratory distress.      Breath sounds: Normal breath sounds. No wheezing.   Abdominal:      General: Bowel sounds are normal. There is no distension.      Palpations: Abdomen is soft.      Tenderness: There is no abdominal tenderness.   Musculoskeletal:         General: Normal range of motion.      Cervical back: Normal range of motion and neck supple. No rigidity or tenderness.   Lymphadenopathy:      Cervical: No cervical adenopathy.   Skin:     General: Skin is warm and dry.   Neurological:      General: No focal deficit present.      " Mental Status: She is alert and oriented to person, place, and time.   Psychiatric:         Mood and Affect: Mood normal.         Behavior: Behavior normal.         Thought Content: Thought content normal.         Judgment: Judgment normal.

## 2025-04-30 NOTE — ASSESSMENT & PLAN NOTE
Lab Results   Component Value Date    HGBA1C 5.8 (H) 04/26/2025   Stable, continue Irbesartan-HCTZ  Follow a low sodium diet     Orders:    TSH, 3rd generation with Free T4 reflex; Future    Lipid panel; Future

## 2025-04-30 NOTE — ASSESSMENT & PLAN NOTE
Stable and managed by Allergist, Dr. Cummings   Continue Albuterol rescue inhaler prn  No longer on maintenance inhaler     Orders:    TSH, 3rd generation with Free T4 reflex; Future

## 2025-04-30 NOTE — PATIENT INSTRUCTIONS
Medicare Preventive Visit Patient Instructions  Thank you for completing your Welcome to Medicare Visit or Medicare Annual Wellness Visit today. Your next wellness visit will be due in one year (5/1/2026).  The screening/preventive services that you may require over the next 5-10 years are detailed below. Some tests may not apply to you based off risk factors and/or age. Screening tests ordered at today's visit but not completed yet may show as past due. Also, please note that scanned in results may not display below.  Preventive Screenings:  Service Recommendations Previous Testing/Comments   Colorectal Cancer Screening  * Colonoscopy    * Fecal Occult Blood Test (FOBT)/Fecal Immunochemical Test (FIT)  * Fecal DNA/Cologuard Test  * Flexible Sigmoidoscopy Age: 45-75 years old   Colonoscopy: every 10 years (may be performed more frequently if at higher risk)  OR  FOBT/FIT: every 1 year  OR  Cologuard: every 3 years  OR  Sigmoidoscopy: every 5 years  Screening may be recommended earlier than age 45 if at higher risk for colorectal cancer. Also, an individualized decision between you and your healthcare provider will decide whether screening between the ages of 76-85 would be appropriate. Colonoscopy: 04/27/2022  FOBT/FIT: Not on file  Cologuard: Not on file  Sigmoidoscopy: Not on file    Screening Current     Breast Cancer Screening Age: 40+ years old  Frequency: every 1-2 years  Not required if history of left and right mastectomy Mammogram: 02/07/2025    Screening Current   Cervical Cancer Screening Between the ages of 21-29, pap smear recommended once every 3 years.   Between the ages of 30-65, can perform pap smear with HPV co-testing every 5 years.   Recommendations may differ for women with a history of total hysterectomy, cervical cancer, or abnormal pap smears in past. Pap Smear: Not on file    Screening Not Indicated   Hepatitis C Screening Once for adults born between 1945 and 1965  More frequently in  patients at high risk for Hepatitis C Hep C Antibody: 08/31/2020    Screening Current   Diabetes Screening 1-2 times per year if you're at risk for diabetes or have pre-diabetes Fasting glucose: 90 mg/dL (4/26/2025)  A1C: 5.8 % (4/26/2025)  Screening Not Indicated  History Diabetes   Cholesterol Screening Once every 5 years if you don't have a lipid disorder. May order more often based on risk factors. Lipid panel: 04/26/2025    Screening Not Indicated  History Lipid Disorder     Other Preventive Screenings Covered by Medicare:  Abdominal Aortic Aneurysm (AAA) Screening: covered once if your at risk. You're considered to be at risk if you have a family history of AAA.  Lung Cancer Screening: covers low dose CT scan once per year if you meet all of the following conditions: (1) Age 55-77; (2) No signs or symptoms of lung cancer; (3) Current smoker or have quit smoking within the last 15 years; (4) You have a tobacco smoking history of at least 20 pack years (packs per day multiplied by number of years you smoked); (5) You get a written order from a healthcare provider.  Glaucoma Screening: covered annually if you're considered high risk: (1) You have diabetes OR (2) Family history of glaucoma OR (3)  aged 50 and older OR (4)  American aged 65 and older  Osteoporosis Screening: covered every 2 years if you meet one of the following conditions: (1) You're estrogen deficient and at risk for osteoporosis based off medical history and other findings; (2) Have a vertebral abnormality; (3) On glucocorticoid therapy for more than 3 months; (4) Have primary hyperparathyroidism; (5) On osteoporosis medications and need to assess response to drug therapy.   Last bone density test (DXA Scan): Not on file.  HIV Screening: covered annually if you're between the age of 15-65. Also covered annually if you are younger than 15 and older than 65 with risk factors for HIV infection. For pregnant patients, it is  covered up to 3 times per pregnancy.    Immunizations:  Immunization Recommendations   Influenza Vaccine Annual influenza vaccination during flu season is recommended for all persons aged >= 6 months who do not have contraindications   Pneumococcal Vaccine   * Pneumococcal conjugate vaccine = PCV13 (Prevnar 13), PCV15 (Vaxneuvance), PCV20 (Prevnar 20)  * Pneumococcal polysaccharide vaccine = PPSV23 (Pneumovax) Adults 19-63 yo with certain risk factors or if 65+ yo  If never received any pneumonia vaccine: recommend Prevnar 20 (PCV20)  Give PCV20 if previously received 1 dose of PCV13 or PPSV23   Hepatitis B Vaccine 3 dose series if at intermediate or high risk (ex: diabetes, end stage renal disease, liver disease)   Respiratory syncytial virus (RSV) Vaccine - COVERED BY MEDICARE PART D  * RSVPreF3 (Arexvy) CDC recommends that adults 60 years of age and older may receive a single dose of RSV vaccine using shared clinical decision-making (SCDM)   Tetanus (Td) Vaccine - COST NOT COVERED BY MEDICARE PART B Following completion of primary series, a booster dose should be given every 10 years to maintain immunity against tetanus. Td may also be given as tetanus wound prophylaxis.   Tdap Vaccine - COST NOT COVERED BY MEDICARE PART B Recommended at least once for all adults. For pregnant patients, recommended with each pregnancy.   Shingles Vaccine (Shingrix) - COST NOT COVERED BY MEDICARE PART B  2 shot series recommended in those 19 years and older who have or will have weakened immune systems or those 50 years and older     Health Maintenance Due:      Topic Date Due   • Breast Cancer Screening: Mammogram  02/07/2026   • Colorectal Cancer Screening  04/25/2029   • Hepatitis C Screening  Completed     Immunizations Due:      Topic Date Due   • COVID-19 Vaccine (8 - 2024-25 season) 04/15/2025     Advance Directives   What are advance directives?  Advance directives are legal documents that state your wishes and plans for  medical care. These plans are made ahead of time in case you lose your ability to make decisions for yourself. Advance directives can apply to any medical decision, such as the treatments you want, and if you want to donate organs.   What are the types of advance directives?  There are many types of advance directives, and each state has rules about how to use them. You may choose a combination of any of the following:  Living will:  This is a written record of the treatment you want. You can also choose which treatments you do not want, which to limit, and which to stop at a certain time. This includes surgery, medicine, IV fluid, and tube feedings.   Durable power of  for healthcare (DPAHC):  This is a written record that states who you want to make healthcare choices for you when you are unable to make them for yourself. This person, called a proxy, is usually a family member or a friend. You may choose more than 1 proxy.  Do not resuscitate (DNR) order:  A DNR order is used in case your heart stops beating or you stop breathing. It is a request not to have certain forms of treatment, such as CPR. A DNR order may be included in other types of advance directives.  Medical directive:  This covers the care that you want if you are in a coma, near death, or unable to make decisions for yourself. You can list the treatments you want for each condition. Treatment may include pain medicine, surgery, blood transfusions, dialysis, IV or tube feedings, and a ventilator (breathing machine).  Values history:  This document has questions about your views, beliefs, and how you feel and think about life. This information can help others choose the care that you would choose.  Why are advance directives important?  An advance directive helps you control your care. Although spoken wishes may be used, it is better to have your wishes written down. Spoken wishes can be misunderstood, or not followed. Treatments may be given  even if you do not want them. An advance directive may make it easier for your family to make difficult choices about your care.   Weight Management   Why it is important to manage your weight:  Being overweight increases your risk of health conditions such as heart disease, high blood pressure, type 2 diabetes, and certain types of cancer. It can also increase your risk for osteoarthritis, sleep apnea, and other respiratory problems. Aim for a slow, steady weight loss. Even a small amount of weight loss can lower your risk of health problems.  How to lose weight safely:  A safe and healthy way to lose weight is to eat fewer calories and get regular exercise. You can lose up about 1 pound a week by decreasing the number of calories you eat by 500 calories each day.   Healthy meal plan for weight management:  A healthy meal plan includes a variety of foods, contains fewer calories, and helps you stay healthy. A healthy meal plan includes the following:  Eat whole-grain foods more often.  A healthy meal plan should contain fiber. Fiber is the part of grains, fruits, and vegetables that is not broken down by your body. Whole-grain foods are healthy and provide extra fiber in your diet. Some examples of whole-grain foods are whole-wheat breads and pastas, oatmeal, brown rice, and bulgur.  Eat a variety of vegetables every day.  Include dark, leafy greens such as spinach, kale, nely greens, and mustard greens. Eat yellow and orange vegetables such as carrots, sweet potatoes, and winter squash.   Eat a variety of fruits every day.  Choose fresh or canned fruit (canned in its own juice or light syrup) instead of juice. Fruit juice has very little or no fiber.  Eat low-fat dairy foods.  Drink fat-free (skim) milk or 1% milk. Eat fat-free yogurt and low-fat cottage cheese. Try low-fat cheeses such as mozzarella and other reduced-fat cheeses.  Choose meat and other protein foods that are low in fat.  Choose beans or other  legumes such as split peas or lentils. Choose fish, skinless poultry (chicken or turkey), or lean cuts of red meat (beef or pork). Before you cook meat or poultry, cut off any visible fat.   Use less fat and oil.  Try baking foods instead of frying them. Add less fat, such as margarine, sour cream, regular salad dressing and mayonnaise to foods. Eat fewer high-fat foods. Some examples of high-fat foods include french fries, doughnuts, ice cream, and cakes.  Eat fewer sweets.  Limit foods and drinks that are high in sugar. This includes candy, cookies, regular soda, and sweetened drinks.  Exercise:  Exercise at least 30 minutes per day on most days of the week. Some examples of exercise include walking, biking, dancing, and swimming. You can also fit in more physical activity by taking the stairs instead of the elevator or parking farther away from stores. Ask your healthcare provider about the best exercise plan for you.      © Copyright [employer] 2018 Information is for End User's use only and may not be sold, redistributed or otherwise used for commercial purposes. All illustrations and images included in CareNotes® are the copyrighted property of [employer] Inc[employer] or [employer]

## 2025-06-13 ENCOUNTER — HOSPITAL ENCOUNTER (OUTPATIENT)
Facility: HOSPITAL | Age: 70
Setting detail: OBSERVATION
Discharge: HOME | End: 2025-06-14
Attending: EMERGENCY MEDICINE | Admitting: INTERNAL MEDICINE
Payer: MEDICARE

## 2025-06-13 ENCOUNTER — APPOINTMENT (EMERGENCY)
Dept: RADIOLOGY | Facility: HOSPITAL | Age: 70
End: 2025-06-13
Payer: MEDICARE

## 2025-06-13 DIAGNOSIS — R26.2 AMBULATORY DYSFUNCTION: Primary | ICD-10-CM

## 2025-06-13 DIAGNOSIS — T14.8XXA MUSCLE STRAIN: ICD-10-CM
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
## 2025-06-13 PROCEDURE — 73552 X-RAY EXAM OF FEMUR 2/>: CPT

## 2025-06-13 PROCEDURE — 72125 CT NECK SPINE W/O DYE: CPT

## 2025-06-13 PROCEDURE — 70450 CT HEAD/BRAIN W/O DYE: CPT

## 2025-06-13 PROCEDURE — 93005 ELECTROCARDIOGRAM TRACING: CPT

## 2025-06-13 PROCEDURE — 73700 CT LOWER EXTREMITY W/O DYE: CPT

## 2025-06-13 PROCEDURE — 73564 X-RAY EXAM KNEE 4 OR MORE: CPT

## 2025-06-13 PROCEDURE — 99285 EMERGENCY DEPT VISIT HI MDM: CPT

## 2025-06-13 PROCEDURE — 99285 EMERGENCY DEPT VISIT HI MDM: CPT | Performed by: EMERGENCY MEDICINE

## 2025-06-13 RX ORDER — BUDESONIDE AND FORMOTEROL FUMARATE DIHYDRATE 160; 4.5 UG/1; UG/1
2 AEROSOL RESPIRATORY (INHALATION) 2 TIMES DAILY
Status: DISCONTINUED | OUTPATIENT
Start: 2025-06-14 | End: 2025-06-14 | Stop reason: HOSPADM

## 2025-06-13 RX ORDER — HYDROCHLOROTHIAZIDE 12.5 MG/1
12.5 TABLET ORAL DAILY
Status: DISCONTINUED | OUTPATIENT
Start: 2025-06-14 | End: 2025-06-14 | Stop reason: HOSPADM

## 2025-06-13 RX ORDER — ALBUTEROL SULFATE 90 UG/1
2 INHALANT RESPIRATORY (INHALATION) EVERY 6 HOURS PRN
Status: DISCONTINUED | OUTPATIENT
Start: 2025-06-13 | End: 2025-06-14 | Stop reason: HOSPADM

## 2025-06-13 RX ORDER — LOSARTAN POTASSIUM 50 MG/1
50 TABLET ORAL DAILY
Status: DISCONTINUED | OUTPATIENT
Start: 2025-06-14 | End: 2025-06-14 | Stop reason: HOSPADM

## 2025-06-13 RX ORDER — FENTANYL CITRATE 50 UG/ML
50 INJECTION, SOLUTION INTRAMUSCULAR; INTRAVENOUS ONCE
Refills: 0 | Status: DISCONTINUED | OUTPATIENT
Start: 2025-06-13 | End: 2025-06-13

## 2025-06-13 RX ORDER — GABAPENTIN 300 MG/1
600 CAPSULE ORAL
Status: DISCONTINUED | OUTPATIENT
Start: 2025-06-13 | End: 2025-06-14

## 2025-06-13 RX ORDER — LIDOCAINE 50 MG/G
1 PATCH TOPICAL DAILY
Status: DISCONTINUED | OUTPATIENT
Start: 2025-06-13 | End: 2025-06-14 | Stop reason: HOSPADM

## 2025-06-13 RX ORDER — OXYCODONE HYDROCHLORIDE 5 MG/1
5 TABLET ORAL ONCE
Refills: 0 | Status: COMPLETED | OUTPATIENT
Start: 2025-06-13 | End: 2025-06-13

## 2025-06-13 RX ORDER — ACETAMINOPHEN 325 MG/1
975 TABLET ORAL EVERY 8 HOURS SCHEDULED
Status: DISCONTINUED | OUTPATIENT
Start: 2025-06-13 | End: 2025-06-14 | Stop reason: HOSPADM

## 2025-06-13 RX ORDER — MONTELUKAST SODIUM 10 MG/1
10 TABLET ORAL DAILY
Status: DISCONTINUED | OUTPATIENT
Start: 2025-06-14 | End: 2025-06-14 | Stop reason: HOSPADM

## 2025-06-13 RX ORDER — ATORVASTATIN CALCIUM 20 MG/1
20 TABLET, FILM COATED ORAL DAILY
Status: DISCONTINUED | OUTPATIENT
Start: 2025-06-14 | End: 2025-06-14

## 2025-06-13 RX ORDER — FAMOTIDINE 20 MG/1
20 TABLET, FILM COATED ORAL DAILY
Status: DISCONTINUED | OUTPATIENT
Start: 2025-06-14 | End: 2025-06-14 | Stop reason: HOSPADM

## 2025-06-13 RX ADMIN — OXYCODONE HYDROCHLORIDE 5 MG: 5 TABLET ORAL at 18:51

## 2025-06-13 NOTE — ED ATTENDING ATTESTATION
6/13/2025  I, Víctor Saldivar MD, saw and evaluated the patient. I have discussed the patient with the resident/non-physician practitioner and agree with the resident's/non-physician practitioner's findings, Plan of Care, and MDM as documented in the resident's/non-physician practitioner's note, except where noted. All available labs and Radiology studies were reviewed.  I was present for key portions of any procedure(s) performed by the resident/non-physician practitioner and I was immediately available to provide assistance.       At this point I agree with the current assessment done in the Emergency Department.  I have conducted an independent evaluation of this patient a history and physical is as follows:    ED Course     Patient presents for evaluation due to a mechanical fall that occurred while she was walking.  She reports tripping over the curb.  Patient does report hitting her head.  She does report left hip pain.  Additionally, patient does report some right knee pain.  She denies any headache, neck pain, nausea, vomiting, or confusion.  No additional complaints.  Exam: AAOx3, mild distress due to pain, RRR, CTA, left hip with tenderness to palpation and pain with range of motion multiple abrasions to lateral aspect of right knee. A/P: Closed head injury, left hip injury, right knee abrasion.  Will check x-rays of left hip and right knee to evaluate for fracture.  Will CT head and C-spine to rule out occult intracranial or cervical injury.    Critical Care Time  Procedures

## 2025-06-14 ENCOUNTER — APPOINTMENT (OUTPATIENT)
Dept: RADIOLOGY | Facility: HOSPITAL | Age: 70
End: 2025-06-14
Payer: MEDICARE

## 2025-06-14 VITALS
DIASTOLIC BLOOD PRESSURE: 64 MMHG | OXYGEN SATURATION: 95 % | TEMPERATURE: 98.2 F | HEART RATE: 89 BPM | SYSTOLIC BLOOD PRESSURE: 123 MMHG | RESPIRATION RATE: 18 BRPM
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
## 2025-06-14 PROBLEM — S76.312A LEFT HAMSTRING MUSCLE STRAIN: Status: ACTIVE | Noted: 2025-06-14

## 2025-06-14 PROBLEM — R26.2 AMBULATORY DYSFUNCTION: Status: ACTIVE | Noted: 2025-06-14

## 2025-06-14 LAB
ALBUMIN SERPL BCG-MCNC: 4.1 G/DL (ref 3.5–5)
ALP SERPL-CCNC: 86 U/L (ref 34–104)
ALT SERPL W P-5'-P-CCNC: 12 U/L (ref 7–52)
ANION GAP SERPL CALCULATED.3IONS-SCNC: 11 MMOL/L (ref 4–13)
AST SERPL W P-5'-P-CCNC: 16 U/L (ref 13–39)
ATRIAL RATE: 101 BPM
BASOPHILS # BLD AUTO: 0.07 THOUSANDS/ÂΜL (ref 0–0.1)
BASOPHILS NFR BLD AUTO: 1 % (ref 0–1)
BILIRUB SERPL-MCNC: 0.65 MG/DL (ref 0.2–1)
BUN SERPL-MCNC: 17 MG/DL (ref 5–25)
CALCIUM SERPL-MCNC: 9.3 MG/DL (ref 8.4–10.2)
CHLORIDE SERPL-SCNC: 103 MMOL/L (ref 96–108)
CO2 SERPL-SCNC: 24 MMOL/L (ref 21–32)
CREAT SERPL-MCNC: 0.97 MG/DL (ref 0.6–1.3)
EOSINOPHIL # BLD AUTO: 0.65 THOUSAND/ÂΜL (ref 0–0.61)
EOSINOPHIL NFR BLD AUTO: 6 % (ref 0–6)
ERYTHROCYTE [DISTWIDTH] IN BLOOD BY AUTOMATED COUNT: 11.8 % (ref 11.6–15.1)
GFR SERPL CREATININE-BSD FRML MDRD: 60 ML/MIN/1.73SQ M
GLUCOSE SERPL-MCNC: 102 MG/DL (ref 65–140)
GLUCOSE SERPL-MCNC: 121 MG/DL (ref 65–140)
GLUCOSE SERPL-MCNC: 123 MG/DL (ref 65–140)
GLUCOSE SERPL-MCNC: 190 MG/DL (ref 65–140)
HCT VFR BLD AUTO: 35.8 % (ref 34.8–46.1)
HGB BLD-MCNC: 11.8 G/DL (ref 11.5–15.4)
IMM GRANULOCYTES # BLD AUTO: 0.02 THOUSAND/UL (ref 0–0.2)
IMM GRANULOCYTES NFR BLD AUTO: 0 % (ref 0–2)
LYMPHOCYTES # BLD AUTO: 1.73 THOUSANDS/ÂΜL (ref 0.6–4.47)
LYMPHOCYTES NFR BLD AUTO: 15 % (ref 14–44)
MCH RBC QN AUTO: 31 PG (ref 26.8–34.3)
MCHC RBC AUTO-ENTMCNC: 33 G/DL (ref 31.4–37.4)
MCV RBC AUTO: 94 FL (ref 82–98)
MONOCYTES # BLD AUTO: 1.24 THOUSAND/ÂΜL (ref 0.17–1.22)
MONOCYTES NFR BLD AUTO: 10 % (ref 4–12)
NEUTROPHILS # BLD AUTO: 8.2 THOUSANDS/ÂΜL (ref 1.85–7.62)
NEUTS SEG NFR BLD AUTO: 68 % (ref 43–75)
NRBC BLD AUTO-RTO: 0 /100 WBCS
P AXIS: 127 DEGREES
PLATELET # BLD AUTO: 298 THOUSANDS/UL (ref 149–390)
PMV BLD AUTO: 10.4 FL (ref 8.9–12.7)
POTASSIUM SERPL-SCNC: 4 MMOL/L (ref 3.5–5.3)
PR INTERVAL: 172 MS
PROT SERPL-MCNC: 6.8 G/DL (ref 6.4–8.4)
QRS AXIS: 199 DEGREES
QRSD INTERVAL: 80 MS
QT INTERVAL: 320 MS
QTC INTERVAL: 414 MS
RBC # BLD AUTO: 3.81 MILLION/UL (ref 3.81–5.12)
SODIUM SERPL-SCNC: 138 MMOL/L (ref 135–147)
T WAVE AXIS: 135 DEGREES
VENTRICULAR RATE: 101 BPM
WBC # BLD AUTO: 11.91 THOUSAND/UL (ref 4.31–10.16)

## 2025-06-14 PROCEDURE — 99223 1ST HOSP IP/OBS HIGH 75: CPT | Performed by: INTERNAL MEDICINE

## 2025-06-14 PROCEDURE — 85025 COMPLETE CBC W/AUTO DIFF WBC: CPT | Performed by: INTERNAL MEDICINE

## 2025-06-14 PROCEDURE — 36415 COLL VENOUS BLD VENIPUNCTURE: CPT | Performed by: INTERNAL MEDICINE

## 2025-06-14 PROCEDURE — 73560 X-RAY EXAM OF KNEE 1 OR 2: CPT

## 2025-06-14 PROCEDURE — 80053 COMPREHEN METABOLIC PANEL: CPT | Performed by: INTERNAL MEDICINE

## 2025-06-14 PROCEDURE — 93010 ELECTROCARDIOGRAM REPORT: CPT | Performed by: INTERNAL MEDICINE

## 2025-06-14 PROCEDURE — 97163 PT EVAL HIGH COMPLEX 45 MIN: CPT

## 2025-06-14 PROCEDURE — 82948 REAGENT STRIP/BLOOD GLUCOSE: CPT

## 2025-06-14 PROCEDURE — NC001 PR NO CHARGE: Performed by: ORTHOPAEDIC SURGERY

## 2025-06-14 RX ORDER — LIDOCAINE 50 MG/G
1 PATCH TOPICAL DAILY
Qty: 2 PATCH | Refills: 0 | Status: SHIPPED | OUTPATIENT
Start: 2025-06-15

## 2025-06-14 RX ORDER — INSULIN LISPRO 100 [IU]/ML
1-5 INJECTION, SOLUTION INTRAVENOUS; SUBCUTANEOUS
Status: DISCONTINUED | OUTPATIENT
Start: 2025-06-14 | End: 2025-06-14 | Stop reason: HOSPADM

## 2025-06-14 RX ORDER — HEPARIN SODIUM 5000 [USP'U]/ML
5000 INJECTION, SOLUTION INTRAVENOUS; SUBCUTANEOUS EVERY 8 HOURS SCHEDULED
Status: DISCONTINUED | OUTPATIENT
Start: 2025-06-14 | End: 2025-06-14 | Stop reason: HOSPADM

## 2025-06-14 RX ORDER — GABAPENTIN 300 MG/1
600 CAPSULE ORAL
Status: DISCONTINUED | OUTPATIENT
Start: 2025-06-14 | End: 2025-06-14 | Stop reason: HOSPADM

## 2025-06-14 RX ORDER — ATORVASTATIN CALCIUM 20 MG/1
20 TABLET, FILM COATED ORAL DAILY
Status: DISCONTINUED | OUTPATIENT
Start: 2025-06-14 | End: 2025-06-14 | Stop reason: HOSPADM

## 2025-06-14 RX ORDER — METHOCARBAMOL 500 MG/1
500 TABLET, FILM COATED ORAL 4 TIMES DAILY
Qty: 12 TABLET | Refills: 0 | Status: SHIPPED | OUTPATIENT
Start: 2025-06-14

## 2025-06-14 RX ADMIN — ACETAMINOPHEN 975 MG: 325 TABLET ORAL at 06:06

## 2025-06-14 RX ADMIN — ATORVASTATIN CALCIUM 20 MG: 20 TABLET, FILM COATED ORAL at 08:41

## 2025-06-14 RX ADMIN — ACETAMINOPHEN 975 MG: 325 TABLET ORAL at 00:58

## 2025-06-14 RX ADMIN — LIDOCAINE 1 PATCH: 50 PATCH CUTANEOUS at 00:58

## 2025-06-14 RX ADMIN — FAMOTIDINE 20 MG: 20 TABLET, FILM COATED ORAL at 08:41

## 2025-06-14 RX ADMIN — HYDROCHLOROTHIAZIDE 12.5 MG: 12.5 TABLET ORAL at 08:41

## 2025-06-14 RX ADMIN — GABAPENTIN 600 MG: 300 CAPSULE ORAL at 04:06

## 2025-06-14 RX ADMIN — SERTRALINE HYDROCHLORIDE 100 MG: 50 TABLET ORAL at 08:41

## 2025-06-14 RX ADMIN — ACETAMINOPHEN 975 MG: 325 TABLET ORAL at 14:24

## 2025-06-14 RX ADMIN — INSULIN LISPRO 1 UNITS: 100 INJECTION, SOLUTION INTRAVENOUS; SUBCUTANEOUS at 07:55

## 2025-06-14 RX ADMIN — LIDOCAINE 1 PATCH: 50 PATCH CUTANEOUS at 08:41

## 2025-06-14 RX ADMIN — LOSARTAN POTASSIUM 50 MG: 50 TABLET, FILM COATED ORAL at 08:41

## 2025-06-14 RX ADMIN — MONTELUKAST 10 MG: 10 TABLET, FILM COATED ORAL at 08:41

## 2025-06-14 NOTE — PLAN OF CARE
Problem: PHYSICAL THERAPY ADULT  Goal: Performs mobility at highest level of function for planned discharge setting.  See evaluation for individualized goals.  Description: Treatment/Interventions: Functional transfer training, LE strengthening/ROM, Therapeutic exercise, Endurance training, Elevations, Bed mobility, Gait training, Equipment eval/education, Patient/family training  Equipment Recommended: Walker (RW- pt plans to use 's)       See flowsheet documentation for full assessment, interventions and recommendations.  Note: Prognosis: Good  Problem List: Decreased strength, Decreased endurance, Impaired balance, Decreased mobility, Pain  Assessment: Pt seen for physical therapy evaluation in the ED.  Pt is a 68 y/o female w/ history/comorbidities of RLS, HTNm HLD, DM who is now admitted after fall.  c/o L posterior thigh pain post same.  Undergoing w/u.  CT hip/LE - for fx, dx is likely hamstring strain.  Asked to assess mobility/gait in ED to determine if pt can d/c home.  Due to acute medical issues, pain, fall risk, note unstable clinical picture.  PT consulted to assess mobility, d/c needs.  Pt  presents w/ decreased functional mob, standing balance, endurance, B LE strength, barriers at home.  If not d/c home today from ED, will continue to follow for above issues.  when stable, reocmmend d/c home w/  support, initial 1st floor setup, RW (pt states she will use 's) and home PT.  Pt in agreement, and provider/nurse aware of recs        Rehab Resource Intensity Level, PT: III (Minimum Resource Intensity)    See flowsheet documentation for full assessment.

## 2025-06-14 NOTE — H&P
"H&P - Hospitalist   Name: Judit Sawyer 69 y.o. female I MRN: [id number]  Unit/Bed#: ED 29 I Date of Admission: 6/13/2025   Date of Service: 6/14/2025 I Hospital Day: 0     Assessment & Plan  Ambulatory dysfunction  In the setting of mechanical fall and left hamstring pain  Admitted observation status for pain control as well as PT/OT  See fall section below  Diabetes mellitus (HCC)  Lab Results   Component Value Date    HGBA1C 5.8 (H) 04/26/2025       No results for input(s): \"POCGLU\" in the last 72 hours.    Blood Sugar Average: Last 72 hrs:    Sliding scale insulin while hospitalized  Holding PTA metformin  Hypertension associated with diabetes  (HCC)  Lab Results   Component Value Date    HGBA1C 5.8 (H) 04/26/2025       No results for input(s): \"POCGLU\" in the last 72 hours.    Blood Sugar Average: Last 72 hrs:    On PTA irbesartan 150mg-hydrochlorothiazide 12.5mg daily   Will put on pharmacy equivalent here at [place]  Restless legs syndrome  Continue PTA gabapentin  Fall  Mechanical fall in which patient fell onto her left side with left posterior thigh pain  CT head and C-spine negative for acute traumatic abnormalities  CT left lower extremity without contrast without hip or femoral fracture  Plain films reviewed without obvious osseous abnormality on my read awaiting official read  Patient complaining of pain in the left posterior hamstring; no ecchymosis or bruising on my exam and she is able to flex and extend the left knee but does have some pain with full extension  ER attempted to ambulate patient but she was unable to several times and recommending admission for pain control as well as PT/OT  Admit to medicine observation status.  Pain control.  PT/OT.      VTE Pharmacologic Prophylaxis: VTE Score: 3 Moderate Risk (Score 3-4) - Pharmacological DVT Prophylaxis Ordered: heparin.  Code Status: Level 1 - Full Code   Discussion with family: Patient declined call to .     Anticipated " Length of Stay: Patient will be admitted on an inpatient basis with an anticipated length of stay of greater than 2 midnights secondary to ambulatory dysfunction in the setting of fall.    History of Present Illness   Chief Complaint: Fall and left thigh pain    Judit Sawyer is a 69 y.o. female with a PMH of restless leg syndrome, hypertension, hyperlipidemia, diabetes who presents after mechanical fall in which she landed on her left side.  Patient complaining of left hip as well as left thigh pain after the fall.  Patient underwent CT head as well as C-spine negative for traumatic abnormality.  She also underwent CT left hip that was negative for fracture.  Patient continued to have left thigh pain and ER recommending admission for pain control as well as PT/OT.    Review of Systems   Musculoskeletal:         Left thigh pain   All other systems reviewed and are negative.      Historical Information   Past Medical History[1]  Past Surgical History[2]  Social History[3]  E-Cigarette/Vaping    E-Cigarette Use Never User      E-Cigarette/Vaping Substances    Nicotine No     THC No     CBD No     Flavoring No     Other No     Unknown No      Family history non-contributory  Social History:  Marital Status: /Civil Union       Meds/Allergies   I have reviewed home medications using recent Epic encounter.  Prior to Admission medications    Medication Sig Start Date End Date Taking? Authorizing Provider   albuterol (Ventolin HFA) 90 mcg/act inhaler Inhale 2 puffs every 6 (six) hours as needed for wheezing 9/18/24   Ni Cummings DO   atorvastatin (LIPITOR) 20 mg tablet Take 1 tablet (20 mg total) by mouth daily 3/19/25   AARTI Smith   azelastine (ASTELIN) 0.1 % nasal spray 2 sprays into each nostril daily Use in each nostril as directed  Patient not taking: Reported on 4/30/2025 6/4/24 6/4/25  Ni Cummings DO   Budeson-Glycopyrrol-Formoterol (Breztri Aerosphere) 160-9-4.8 MCG/ACT AERO Inhale  2 puffs 2 (two) times a day Rinse mouth after use. 12/4/24   Ni Cummings DO   calcium-vitamin D (OSCAL) 250-125 MG-UNIT per tablet Take 1 tablet by mouth daily.    Historical Provider, MD   famotidine (PEPCID) 40 MG tablet take 1 tablet by mouth daily at bedtime AS NEEDED FOR HEARTBURN 4/8/24   Juan Alberts MD   gabapentin (NEURONTIN) 300 mg capsule Take 2 capsules (600 mg total) by mouth daily at bedtime 1/31/25   AARTI Doshi   irbesartan-hydrochlorothiazide (AVALIDE) 150-12.5 MG per tablet TAKE 1 TABLET BY MOUTH EVERY DAY 3/14/25   AARTI Smith   metFORMIN (GLUCOPHAGE) 1000 MG tablet TAKE 1 TABLET BY MOUTH TWICE A DAY WITH MEALS 1/23/25   AARTI Doshi   Mometasone Furoate POWD Dissolve 1 mg in [facility] Sinus rinse bottle and irrigate sinuses twice daily. 90 day supply  Patient not taking: Reported on 4/30/2025 11/27/24   Yvan Blackman MD   montelukast (SINGULAIR) 10 mg tablet take 1 tablet by mouth once daily 11/26/23   Juan Alberts MD   Multiple Vitamin (MULTIVITAMIN) tablet Take 1 tablet by mouth daily.    Historical Provider, MD   sertraline (ZOLOFT) 100 mg tablet TAKE 1 TABLET BY MOUTH EVERY DAY 4/11/25   AARTI Smith   tacrolimus (PROTOPIC) 0.1 % ointment APPLY TO AFFECTED AREA TWICE A DAY FOR 2 WEEKS 2/26/25   Historical Provider, MD     Allergies   Allergen Reactions    Augmentin [Amoxicillin-Pot Clavulanate] Abdominal Pain     Nausea, feels terrible    Cephalexin      Annotation - [id number]: n/v       Objective :  Temp:  [98.2 °F (36.8 °C)] 98.2 °F (36.8 °C)  HR:  [] 90  BP: (125-167)/(58-97) 126/60  Resp:  [14-29] 20  SpO2:  [95 %-97 %] 96 %  O2 Device: None (Room air)    Physical Exam  Vitals reviewed.   Constitutional:       Appearance: She is not toxic-appearing.   HENT:      Right Ear: External ear normal.      Left Ear: External ear normal.      Nose: No congestion.      Mouth/Throat:      Pharynx: No oropharyngeal exudate.      Eyes:      Extraocular Movements: Extraocular movements intact.      Pupils: Pupils are equal, round, and reactive to light.       Cardiovascular:      Rate and Rhythm: Normal rate.      Heart sounds:      No gallop.   Pulmonary:      Effort: No respiratory distress.   Abdominal:      Tenderness: There is no abdominal tenderness.     Musculoskeletal:      Comments: Left medial hamstring tenderness to palpation     Skin:     General: Skin is warm and dry.      Capillary Refill: Capillary refill takes less than 2 seconds.     Neurological:      General: No focal deficit present.      Mental Status: She is alert. Mental status is at baseline.                Lab Results: I have reviewed the following results:                  Lab Results   Component Value Date    HGBA1C 5.8 (H) 04/26/2025    HGBA1C 6.0 09/18/2024    HGBA1C 5.7 01/03/2024           Imaging Results Review: I reviewed radiology reports from this admission including: CT head, CT C-spine, and CT left hip.  Other Study Results Review: EKG was reviewed.     Administrative Statements   I have spent a total time of 75 minutes in caring for this patient on the day of the visit/encounter including Diagnostic results, Prognosis, and Risks and benefits of tx options.    ** Please Note: This note has been constructed using a voice recognition system. **         [1]   Past Medical History:  Diagnosis Date    Chronic cough 05/15/2018    Colon polyp     Diabetes mellitus (HCC)     Eczema     Gallbladder disease     Hyperlipidemia     Hypertension     Insomnia     Nasal polyps 2024   [2]   Past Surgical History:  Procedure Laterality Date    CHOLECYSTECTOMY      COLONOSCOPY      NASAL SEPTUM SURGERY      nasal septal deviation repair    OK ESOPHAGOGASTRODUODENOSCOPY TRANSORAL DIAGNOSTIC N/A 2/25/2016    Procedure: EGD AND COLONOSCOPY;  Surgeon: Kenny Irving MD;  Location: [facility];  Service: Gastroenterology   [3]   Social History  Tobacco Use    Smoking status:  Never    Smokeless tobacco: Never    Tobacco comments:     no known exposure to tobacco smoke   Vaping Use    Vaping status: Never Used   Substance and Sexual Activity    Alcohol use: Not Currently    Drug use: Never    Sexual activity: Yes     Partners: Male     Birth control/protection: None

## 2025-06-14 NOTE — ASSESSMENT & PLAN NOTE
In the setting of mechanical fall and left hamstring pain  Admitted observation status for pain control as well as PT/OT  PT recommending level 3, with  support, 1st floor, RW that she has at home.   See fall section below  Amb referral to PT on discharge   Recommended rest, ice, pain control with alternating aleve and tylenol. Lidocaine patches ordered.

## 2025-06-14 NOTE — ASSESSMENT & PLAN NOTE
Lab Results   Component Value Date    HGBA1C 5.8 (H) 04/26/2025       Recent Labs     06/14/25  0744 06/14/25  1129   POCGLU 190* 102       Blood Sugar Average: Last 72 hrs:  (P) 146  On PTA irbesartan 150mg-hydrochlorothiazide 12.5mg daily

## 2025-06-14 NOTE — ASSESSMENT & PLAN NOTE
"Lab Results   Component Value Date    HGBA1C 5.8 (H) 04/26/2025       No results for input(s): \"POCGLU\" in the last 72 hours.    Blood Sugar Average: Last 72 hrs:    Sliding scale insulin while hospitalized  Holding PTA metformin, resume on discharge  "

## 2025-06-14 NOTE — ASSESSMENT & PLAN NOTE
In the setting of mechanical fall and left hamstring pain  Admitted observation status for pain control as well as PT/OT  See fall section below

## 2025-06-14 NOTE — DISCHARGE INSTR - AVS FIRST PAGE
Dear Judit Sawyer,     It was our pleasure to care for you here at [facility].  It is our hope that we were always able to meet and exceed the expected standards for your care during your stay.  You were hospitalized due to a fall .  You were cared for in the ED under the service of Yue Bangura PA-C with the [facility] who covers for your primary care physician (PCP), AARTI Smith, while you were hospitalized.  If you have any questions or concerns related to this hospitalization, you may contact us at .  For follow up, we recommend that you follow up with your primary care physician.  Please review this entire discharge summary as additional general instructions may be provided later as well.  However, at this time we provide for you here, the most important instructions / recommendations at discharge:     You may alternate between Aleve and Tylenol for pain. No more than 4g tylenol in 24 hours  I have also sent a prescription for robaxin (muscle relaxer). You may take this every 8 hours AS NEEDED for muscle spasms.   I have placed an ambulatory referral to PT on discharge  Please see education regarding muscle strain/after care      Sincerely,     Yue Bangura PA-C

## 2025-06-14 NOTE — PHYSICAL THERAPY NOTE
Physical Therapy Evaluation    Patient's Name: Judit Sawyer    Admitting Diagnosis  Unspecified multiple injuries, initial encounter [T07.XXXA]    Problem List  Problem List[1]    Past Medical History  Past Medical History[2]    Past Surgical History  Past Surgical History[3]         06/14/25 1130   PT Last Visit   PT Visit Date 06/14/25   Note Type   Note type Evaluation  (see in ED)   Pain Assessment   Pain Assessment Tool 0-10   Pain Score 7   Pain Location/Orientation Orientation: Left;Location: Leg  (hamstring)   Restrictions/Precautions   Weight Bearing Precautions Per Order No   Other Precautions Pain;Fall Risk;Multiple lines;Telemetry   Home Living   Type of Home House   Additional Comments Resides w/  in 2 story home w/ ability to stay on 1st floor.  1 STEW.   can assista as needed.  Indep self care, ambulates w/ out device.   owns cane and RW from prior sx   Prior Function   Level of [place] Independent with ADLs;Independent with functional mobility   Falls in the last 6 months 1 to 4  (1 leading to this admit)   General   Family/Caregiver Present No   Cognition   Overall Cognitive Status WFL   Arousal/Participation Responsive   Orientation Level Oriented X4   Memory Unable to assess   Following Commands Follows one step commands without difficulty   Subjective   Subjective states she feels OK.  admits to L LE pain w mobility/ WBing   RLE Assessment   RLE Assessment   (strength grossly 4/5- assessed w/ mobility)   LLE Assessment   LLE Assessment   (strength grossly 3 to 3+/5, assessed w/ mobility and limited by pain)   Coordination   Movements are Fluid and Coordinated 0   Coordination and Movement Description mild ataxia   Bed Mobility   Supine to Sit 5  Supervision   Additional items Assist x 1;Increased time required   Sit to Supine 5  Supervision   Additional items Assist x 1;Increased time required;Verbal cues   Transfers   Sit to Stand 5  Supervision   Additional items  Assist x 1;Increased time required;Verbal cues   Stand to Sit 5  Supervision   Additional items Assist x 1;Increased time required;Verbal cues   Ambulation/Elevation   Gait pattern   (slow, antalgic.  WBing more on L toes than full LE.)   Gait Assistance   (CGA- verbal cues for sequencing, safe RW use)   Additional items Assist x 1   Assistive Device Rolling walker   Distance 60' w/ standing rest   Balance   Static Sitting Good   Dynamic Sitting Fair   Static Standing Fair   Dynamic Standing Fair -   Ambulatory Fair -   Endurance Deficit   Endurance Deficit Yes   Endurance Deficit Description fatigue, weakness, pain   Activity Tolerance   Activity Tolerance Patient tolerated treatment well;Patient limited by pain;Patient limited by fatigue   Nurse Made Aware yes   Assessment   Prognosis Good   Problem List Decreased strength;Decreased endurance;Impaired balance;Decreased mobility;Pain   Assessment Pt seen for physical therapy evaluation in the ED.  Pt is a 68 y/o female w/ history/comorbidities of RLS, HTNm HLD, DM who is now admitted after fall.  c/o L posterior thigh pain post same.  Undergoing w/u.  CT hip/LE - for fx, dx is likely hamstring strain.  Asked to assess mobility/gait in ED to determine if pt can d/c home.  Due to acute medical issues, pain, fall risk, note unstable clinical picture.  PT consulted to assess mobility, d/c needs.  Pt  presents w/ decreased functional mob, standing balance, endurance, B LE strength, barriers at home.  If not d/c home today from ED, will continue to follow for above issues.  when stable, reocmmend d/c home w/  support, initial 1st floor setup, RW (pt states she will use 's) and home PT.  Pt in agreement, and provider/nurse aware of recs   Goals   Patient Goals to go home, to have less pain   STG Expiration Date 06/28/25   Short Term Goal #1 1-2 wks (if not d/c home today): bed mob and transfers w/ indep, standing balance to good/normal w/ device, ambulate  100-200 ft w/ RW and S/mod I, increase B LE strength by 1/2 -1 grade, ambulate 5-7 stairs w/ S   PT Treatment Day 0   Plan   Treatment/Interventions Functional transfer training;LE strengthening/ROM;Therapeutic exercise;Endurance training;Elevations;Bed mobility;Gait training;Equipment eval/education;Patient/family training   PT Frequency 3-5x/wk   Discharge Recommendation   Rehab Resource Intensity Level, PT III (Minimum Resource Intensity)   Equipment Recommended Walker  (RW- pt plans to use 's)   AM-PAC Basic Mobility Inpatient   Turning in Flat Bed Without Bedrails 4   Lying on Back to Sitting on Edge of Flat Bed Without Bedrails 4   Moving Bed to Chair 3   Standing Up From Chair Using Arms 4   Walk in Room 3   Climb 3-5 Stairs With Railing 3   Basic Mobility Inpatient Raw Score 21   Basic Mobility Standardized Score 45.55   [facility] Highest Level Of Mobility   JH-HLM Goal 6: Walk 10 steps or more   JH-HLM Achieved 7: Walk 25 feet or more       Amrik Roberts PT, DPT, CSRS        [1]   Patient Active Problem List  Diagnosis    Anxiety    Diabetes mellitus (HCC)    Eczema    Gastroesophageal reflux disease    Hot flashes due to menopause    Hyperlipidemia    Hypertension    Restless legs syndrome    Allergic rhinitis    Mild persistent asthma without complication    Premature ventricular beat    Allergic rhinitis due to house dust mite    Chronic seasonal allergic rhinitis due to pollen    Chronic bronchitis, mucopurulent (HCC)    Ambulatory dysfunction    Fall   [2]   Past Medical History:  Diagnosis Date    Chronic cough 05/15/2018    Colon polyp     Diabetes mellitus (HCC)     Eczema     Gallbladder disease     Hyperlipidemia     Hypertension     Insomnia     Nasal polyps 2024   [3]   Past Surgical History:  Procedure Laterality Date    CHOLECYSTECTOMY      COLONOSCOPY      NASAL SEPTUM SURGERY      nasal septal deviation repair    OK ESOPHAGOGASTRODUODENOSCOPY TRANSORAL DIAGNOSTIC N/A 2/25/2016     Procedure: EGD AND COLONOSCOPY;  Surgeon: Kenny Irving MD;  Location: [facility];  Service: Gastroenterology

## 2025-06-14 NOTE — ASSESSMENT & PLAN NOTE
On PTA irbesartan 150mg-hydrochlorothiazide 12.5mg daily   Will put on pharmacy equivalent here at [place]

## 2025-06-14 NOTE — ASSESSMENT & PLAN NOTE
"Lab Results   Component Value Date    HGBA1C 5.8 (H) 04/26/2025       No results for input(s): \"POCGLU\" in the last 72 hours.    Blood Sugar Average: Last 72 hrs:    On PTA irbesartan 150mg-hydrochlorothiazide 12.5mg daily   Will put on pharmacy equivalent here at [facility]"

## 2025-06-14 NOTE — ASSESSMENT & PLAN NOTE
Mechanical fall in which patient fell onto her left side with left posterior thigh pain  CT head and C-spine negative for acute traumatic abnormalities  CT left lower extremity without contrast without hip or femoral fracture  Plain films reviewed without obvious osseous abnormality on my read awaiting official read  Patient complaining of pain in the left posterior hamstring; no ecchymosis or bruising on my exam and she is able to flex and extend the left knee but does have some pain with full extension  ER attempted to ambulate patient but she was unable to several times and recommending admission for pain control as well as PT/OT  Admit to medicine observation status.  Pain control.  PT/OT  On admission L femur XR revealing possible minimally displaced fx of anterior tibial plateau. Recommended dedicated L knee imaging. CT L lower extremity ordered w/o evidence of femur/hip fracture. Discussed with radiologist -- recommended repeat CT as prior did not evaluate knee/tibial plateau which was ordered. Attempted to discuss with ortho, requesting consult. CT discontinued, L XR ordered by ortho, then discontinued given benign physical exam. XR appears to have been reordered and taken prior to discharge. Final read pending

## 2025-06-14 NOTE — ASSESSMENT & PLAN NOTE
"Lab Results   Component Value Date    HGBA1C 5.8 (H) 04/26/2025       No results for input(s): \"POCGLU\" in the last 72 hours.    Blood Sugar Average: Last 72 hrs:    Sliding scale insulin while hospitalized  Holding PTA metformin  "

## 2025-06-14 NOTE — QUICK NOTE
Patient initially discharged after evaluation by PT today. After further chart review and discussion with radiology room, appears CT LLE did not evaluate the fracture in question from the left knee x-ray.  Discharge order discontinued. Stat CT lower extremity was ordered.  Discussed with Ortho who recommended consultation, and d/c CT scan as they will order STAT XR films. Plan is for discharge pending finalized imaging.

## 2025-06-14 NOTE — ASSESSMENT & PLAN NOTE
Mechanical fall in which patient fell onto her left side with left posterior thigh pain  CT head and C-spine negative for acute traumatic abnormalities  CT left lower extremity without contrast without hip or femoral fracture  Plain films reviewed without obvious osseous abnormality on my read awaiting official read  Patient complaining of pain in the left posterior hamstring; no ecchymosis or bruising on my exam and she is able to flex and extend the left knee but does have some pain with full extension  ER attempted to ambulate patient but she was unable to several times and recommending admission for pain control as well as PT/OT  Admit to medicine observation status.  Pain control.  PT/OT.

## 2025-06-14 NOTE — DISCHARGE SUMMARY
Discharge Summary - Hospitalist   Name: Judit Sawyer 69 y.o. female I MRN: [id number]  Unit/Bed#: ED 29 I Date of Admission: 6/13/2025   Date of Service: 6/14/2025 I Hospital Day: 0     Assessment & Plan  Ambulatory dysfunction  In the setting of mechanical fall and left hamstring pain  Admitted observation status for pain control as well as PT/OT  PT recommending level 3, with  support, 1st floor, RW that she has at home.   See fall section below  Amb referral to PT on discharge   Recommended rest, ice, pain control with alternating aleve and tylenol. Lidocaine patches ordered.   Diabetes mellitus (HCC)  Lab Results   Component Value Date    HGBA1C 5.8 (H) 04/26/2025       Recent Labs     06/14/25  0744 06/14/25  1129   POCGLU 190* 102       Blood Sugar Average: Last 72 hrs:  (P) 146  Sliding scale insulin while hospitalized  Resume metformin on discharge   Hypertension  Lab Results   Component Value Date    HGBA1C 5.8 (H) 04/26/2025       Recent Labs     06/14/25  0744 06/14/25  1129   POCGLU 190* 102       Blood Sugar Average: Last 72 hrs:  (P) 146  On PTA irbesartan 150mg-hydrochlorothiazide 12.5mg daily   Restless legs syndrome  Continue PTA gabapentin  Fall  Mechanical fall in which patient fell onto her left side with left posterior thigh pain  CT head and C-spine negative for acute traumatic abnormalities  CT left lower extremity without contrast without hip or femoral fracture  Plain films reviewed without obvious osseous abnormality on my read awaiting official read  Patient complaining of pain in the left posterior hamstring; no ecchymosis or bruising on my exam and she is able to flex and extend the left knee but does have some pain with full extension  ER attempted to ambulate patient but she was unable to several times and recommending admission for pain control as well as PT/OT  Admit to medicine observation status.  Pain control.  PT/OT  On admission L femur XR revealing possible minimally  displaced fx of anterior tibial plateau. Recommended dedicated L knee imaging. CT L lower extremity ordered w/o evidence of femur/hip fracture. Discussed with radiologist -- recommended repeat CT as prior did not evaluate knee/tibial plateau which was ordered. Attempted to discuss with ortho, requesting consult. CT discontinued, L XR ordered by ortho, then discontinued given benign physical exam. XR appears to have been reordered and taken prior to discharge. Final read pending     Medical Problems       Resolved Problems  Date Reviewed: 6/13/2025   None       Discharging Physician / Practitioner: Yue Bangura PA-C  PCP: AARTI Smith  Admission Date:   Admission Orders (From admission, onward)       Ordered        06/13/25 1385  Place in Observation  Once                          Discharge Date: 06/14/25      Test Results Pending at Discharge (will require follow up):  None    Medication Changes for Discharge & Rationale:   None   See after visit summary for reconciled discharge medications provided to patient and/or family.     Consultations During Hospital Stay:  None     Procedures Performed:   None     Significant Findings / Test Results:     XR femur 2 views LEFT 6/13/2025  Impression: Possible minimally displaced fracture of the anterior tibial plateau versus simulation of a fracture related to projection. The latter is favored as there is no joint effusion. Recommend correlation with physical exam for point tenderness and dedicated left knee radiographs if indicated.     XR knee 4+ vw right injury 6/13/2025  Impression: No acute osseous abnormality.     CT lower extremity wo contrast left 6/13/2025  Impression: No left hip/femoral fracture.     TRAUMA - CT head wo contrast: 6/13/2025  Impression: No intracranial hemorrhage or calvarial fracture.    TRAUMA - CT spine cervical wo contrast: 6/13/2025  Impression: No cervical spine fracture or traumatic malalignment.     Incidental Findings:    None      Hospital Course:   Judit Sawyer is a 69 y.o. female patient who originally presented to the hospital on 6/13/2025 due to mechanical fall. CT head/CT cspine negative for acute fractures. XR images of her L knee with concern for possible minimally displaced fracture of anterior tibial plateau vs. Simulation of a fracture related to projection. A dedicated CT of the lower extremity was taken without evidence of left hip/femoral fracture. Discussed with radiology via phone who states CT imaging did not evaluate the knee. She was evaluated by PT and recommended discharge home w/ support at home, and will utilize walker at home as needed. Ortho recommended consultation and repeat L knee XR w/o evidence of fracture. Stable for discharge at this time. Encouraged supportive care with ice and rest, and pain control by alternating aleve and tylenol and utilizing lidocaine patch. Patient agreeable to plan. Outpatient PT referral placed on discharge.     Please see above list of diagnoses and related plan for additional information.     Discharge Day Visit / Exam:   * Please refer to separate progress note for these details *    Discussion with Family: Patient declined call to .     Discharge instructions/Information to patient and family:   See after visit summary for information provided to patient and family.      Provisions for Follow-Up Care:  See after visit summary for information related to follow-up care and any pertinent home health orders.      Mobility at time of Discharge:   Basic Mobility Inpatient Raw Score: 21  JH-HLM Goal: 6: Walk 10 steps or more  JH-HLM Achieved: 7: Walk 25 feet or more  HLM Goal achieved. Continue to encourage appropriate mobility.     Disposition:   Home    Planned Readmission: None    Administrative Statements   Discharge Statement:  I have spent a total time of 41 minutes in caring for this patient on the day of the visit/encounter. >30 minutes of time was  spent on: Diagnostic results, Risks and benefits of tx options, Instructions for management, Patient and family education, Counseling / Coordination of care, Documenting in the medical record, Reviewing / ordering tests, medicine, procedures  , and Communicating with other healthcare professionals .    **Please Note: This note may have been constructed using a voice recognition system**

## 2025-06-14 NOTE — ED NOTES
Pt ambulates to restroom with her walker, she does not need any assistance but staff has been supervising due to fall risk     Gaviota Leroy RN  06/14/25 3637

## 2025-06-14 NOTE — CONSULTS
Consultation - Orthopedics   Name: Judit Sawyer 69 y.o. female I MRN: [id number]  Unit/Bed#: ED 29 I Date of Admission: 6/13/2025   Date of Service: 6/14/2025 I Hospital Day: 0   Inpatient consult to Orthopedic Surgery  Consult performed by: Kyrie Franks MD  Consult ordered by: Yue Bangura PA-C        Physician Requesting Evaluation: Maurilio Arguello DO   Reason for Evaluation / Principal Problem: left leg pain      Assessment & Plan  Left hamstring muscle strain  69 y.o.female with left hamstring muscle strain. Plan for non-operative management with conservative care.    WBAT LLE  PT/OT  Pain control  Medical management per medicine team  Dispo planning   Follow up with sports medicine orthopedics    Please contact the SecureChat role for the Orthopedics service with any questions/concerns.    History of Present Illness   HPI: Judit Sawyer is a 69 y.o. female community ambulator presented yesterday after a trip and fall from standing. She states she had trouble standing afterwards and presetned to the ED yesterday.    Review of Systems  I have reviewed the patient's available PMH, PSH, Social History, Family History, Meds, and Allergies  Historical Information   Past Medical History[1]  Past Surgical History[2]  Social History[3]  E-Cigarette/Vaping    E-Cigarette Use Never User      E-Cigarette/Vaping Substances    Nicotine No     THC No     CBD No     Flavoring No     Other No     Unknown No        Social History[4]    Current Facility-Administered Medications:     acetaminophen (TYLENOL) tablet 975 mg, Q8H JASON    albuterol (PROVENTIL HFA,VENTOLIN HFA) inhaler 2 puff, Q6H PRN    atorvastatin (LIPITOR) tablet 20 mg, Daily    budesonide-formoterol (SYMBICORT) 160-4.5 mcg/act inhaler 2 puff, BID    famotidine (PEPCID) tablet 20 mg, Daily    gabapentin (NEURONTIN) capsule 600 mg, HS    heparin (porcine) subcutaneous injection 5,000 Units, Q8H JASON    losartan (COZAAR) tablet 50 mg, Daily **AND**  hydroCHLOROthiazide tablet 12.5 mg, Daily    insulin lispro (HumALOG/ADMELOG) 100 units/mL subcutaneous injection 1-5 Units, TID AC **AND** Fingerstick Glucose (POCT), TID AC    insulin lispro (HumALOG/ADMELOG) 100 units/mL subcutaneous injection 1-5 Units, HS    lidocaine (LIDODERM) 5 % patch 1 patch, Daily    montelukast (SINGULAIR) tablet 10 mg, Daily    sertraline (ZOLOFT) tablet 100 mg, Daily  Prior to Admission Medications   Prescriptions Last Dose Informant Patient Reported? Taking?   Budeson-Glycopyrrol-Formoterol (Breztri Aerosphere) 160-9-4.8 MCG/ACT AERO  Self No No   Sig: Inhale 2 puffs 2 (two) times a day Rinse mouth after use.   Mometasone Furoate POWD  Self No No   Sig: Dissolve 1 mg in [employer] Sinus rinse bottle and irrigate sinuses twice daily. 90 day supply   Patient not taking: Reported on 2025   Multiple Vitamin (MULTIVITAMIN) tablet  Self Yes No   Sig: Take 1 tablet by mouth daily.   albuterol (Ventolin HFA) 90 mcg/act inhaler  Self No No   Sig: Inhale 2 puffs every 6 (six) hours as needed for wheezing   atorvastatin (LIPITOR) 20 mg tablet  Self No No   Sig: Take 1 tablet (20 mg total) by mouth daily   azelastine (ASTELIN) 0.1 % nasal spray  Self No No   Si sprays into each nostril daily Use in each nostril as directed   Patient not taking: Reported on 2025   calcium-vitamin D (OSCAL) 250-125 MG-UNIT per tablet  Self Yes No   Sig: Take 1 tablet by mouth daily.   famotidine (PEPCID) 40 MG tablet  Self No No   Sig: take 1 tablet by mouth daily at bedtime AS NEEDED FOR HEARTBURN   gabapentin (NEURONTIN) 300 mg capsule  Self No No   Sig: Take 2 capsules (600 mg total) by mouth daily at bedtime   irbesartan-hydrochlorothiazide (AVALIDE) 150-12.5 MG per tablet  Self No No   Sig: TAKE 1 TABLET BY MOUTH EVERY DAY   metFORMIN (GLUCOPHAGE) 1000 MG tablet  Self No No   Sig: TAKE 1 TABLET BY MOUTH TWICE A DAY WITH MEALS   montelukast (SINGULAIR) 10 mg tablet  Self No No   Sig: take 1 tablet by  "mouth once daily   sertraline (ZOLOFT) 100 mg tablet  Self No No   Sig: TAKE 1 TABLET BY MOUTH EVERY DAY   tacrolimus (PROTOPIC) 0.1 % ointment   Yes No   Sig: APPLY TO AFFECTED AREA TWICE A DAY FOR 2 WEEKS      Facility-Administered Medications: None     Augmentin [amoxicillin-pot clavulanate] and Cephalexin    Objective      Temp:  [98.2 °F (36.8 °C)] 98.2 °F (36.8 °C)  HR:  [] 89  BP: (123-167)/(58-97) 123/64  Resp:  [14-29] 18  SpO2:  [95 %-98 %] 95 %  O2 Device: None (Room air)  O2 Device: None (Room air)          I/O       None            Physical Exam   Ortho Exam   Musculoskeletal: Left Lower Extremity  No obvious deformity, no open wounds, no ecchymosis  TTP over musculature over posterior left thigh. No bony tenderness otherwise.  Tenderness elicited with extreme of knee extension, but otherwise full passive ROM  Straight leg raise intact.  Sensation intact in PAYNE/SA/SP/DP/Tibial distributions  Motor intact to ankle plantarflexion/dorsiflexion, EHL/FHL  Extremity is warm and well perfused with capillary refill < 2 seconds      No other areas of tenderness on tertiary exam.        Imaging:  No evidence of fracture, dislocation, or other acute osseous abnormality        Lab Results: I have reviewed the following results:   Recent Labs     06/14/25  0059   WBC 11.91*   HGB 11.8   HCT 35.8      BUN 17   CREATININE 0.97     Blood Culture: No results found for: \"BLOODCX\"  Wound Culture: No results found for: \"WOUNDCULT\"         [1]   Past Medical History:  Diagnosis Date    Chronic cough 05/15/2018    Colon polyp     Diabetes mellitus (HCC)     Eczema     Gallbladder disease     Hyperlipidemia     Hypertension     Insomnia     Nasal polyps 2024   [2]   Past Surgical History:  Procedure Laterality Date    CHOLECYSTECTOMY      COLONOSCOPY      NASAL SEPTUM SURGERY      nasal septal deviation repair    MI ESOPHAGOGASTRODUODENOSCOPY TRANSORAL DIAGNOSTIC N/A 2/25/2016    Procedure: EGD AND COLONOSCOPY;  " Surgeon: Kenny Irving MD;  Location: [facility];  Service: Gastroenterology   [3]   Social History  Tobacco Use    Smoking status: Never    Smokeless tobacco: Never    Tobacco comments:     no known exposure to tobacco smoke   Vaping Use    Vaping status: Never Used   Substance and Sexual Activity    Alcohol use: Not Currently    Drug use: Never    Sexual activity: Yes     Partners: Male     Birth control/protection: None   [4]   Social History  Tobacco Use    Smoking status: Never    Smokeless tobacco: Never    Tobacco comments:     no known exposure to tobacco smoke   Vaping Use    Vaping status: Never Used   Substance and Sexual Activity    Alcohol use: Not Currently    Drug use: Never    Sexual activity: Yes     Partners: Male     Birth control/protection: None

## 2025-06-14 NOTE — ASSESSMENT & PLAN NOTE
69 y.o.female with left hamstring muscle strain. Plan for non-operative management with conservative care.    WBAT LLE  PT/OT  Pain control  Medical management per medicine team  Dispo planning   Follow up with sports medicine orthopedics

## 2025-06-14 NOTE — ED PROVIDER NOTES
Emergency Department Trauma Note  Judit Sawyer 69 y.o. female MRN: [id number]  Unit/Bed#: ED 29/ED 29 Encounter: [id number]      Trauma Alert: Trauma Acuity: C  Model of Arrival:   via    Trauma Team: Current Providers  Attending Provider: Víctor Saldivar MD  Attending Provider: Prosper Nunez DO  Attending Provider: Maurilio Arguello DO  Resident: Belgica Proctor MD  Registered Nurse: Celia Fontana RN  Registered Nurse: Myrtle Cruz RN  ED Technician: Marvin Ty  Resident: Shereen Liriano DO  Registered Nurse: Kayley Lujan RN  Resident: Sandhya Ramirez DO  Registered Nurse: Marimar Foster RN  Physical Therapist: Amrik Roberts PT  Registered Nurse: Malka Ramirez RN  Resident: Crista Adame MD  Consultants:     None      History of Present Illness     Chief Complaint:   Chief Complaint   Patient presents with    Fall     Pt reports tripping on a curb about a block from her house. C/o left thigh pain +headstrike, +ASA, -LOC     HPI:  Judit Sawyer is a 69 y.o. female who presents with left leg pain.  Mechanism:Details of Incident: pt reports tripping on the curb c/o of left thigh pain +headstrike, +ASA, -LOC          Patient is a 69-year-old female, past medical history significant for diabetes, hyperlipidemia, hypertension, presenting today for evaluation of a fall.  Patient was on a walk with her  tonight when she stepped off the edge of the curb causing her to have a mechanical fall with head strike.  Patient does report striking the back of her head on the sidewalk, she does report landing on the left hip.  She is complaining of left leg pain.  She was unable to ambulate after falling secondary to pain.  She denies any numbness or weakness in the extremity.  No shortness of breath, chest pain, abdominal pain, headache, vomiting, or nausea.  Denies any loss of consciousness, confusion, or visual disturbances.  Patient does take a daily baby  aspirin        Review of Systems    Historical Information     Immunizations:   Immunization History   Administered Date(s) Administered    COVID-19 MODERNA VACC 0.5 ML IM 02/04/2021, 03/04/2021, 10/31/2021, 04/23/2022    COVID-19 Moderna Vac BIVALENT 12 Yr+ IM 0.5 ML 10/08/2022    COVID-19 Moderna mRNA Vaccine 12 Yr+ 50 mcg/0.5 mL (Spikevax) 10/07/2023, 10/15/2024    INFLUENZA 10/06/2016, 09/23/2017, 09/30/2017, 11/04/2018, 11/01/2019, 10/01/2020, 10/03/2020    Influenza Injectable, MDCK, Preservative Free, Quadrivalent, 0.5 mL 10/26/2019    Influenza Quadrivalent 3 years and older 11/01/2019, 10/01/2020    Influenza, seasonal, injectable 10/06/2016, 09/30/2017    Influenza, seasonal, injectable, preservative free 11/10/2018    Pneumococcal Conjugate Vaccine 20-valent (Pcv20), Polysace 03/02/2023    Pneumococcal Polysaccharide PPV23 08/21/2019    Tdap 06/21/2016    Zoster Vaccine Recombinant 01/18/2020, 03/28/2020       Past Medical History[1]  Family History[2]  Past Surgical History[3]  Social History[4]  E-Cigarette/Vaping    E-Cigarette Use Never User      E-Cigarette/Vaping Substances    Nicotine No     THC No     CBD No     Flavoring No     Other No     Unknown No        Family History: non-contributory    Meds/Allergies   Prior to Admission Medications   Prescriptions Last Dose Informant Patient Reported? Taking?   Budeson-Glycopyrrol-Formoterol (Breztri Aerosphere) 160-9-4.8 MCG/ACT AERO  Self No No   Sig: Inhale 2 puffs 2 (two) times a day Rinse mouth after use.   Mometasone Furoate POWD  Self No No   Sig: Dissolve 1 mg in Neilmed Sinus rinse bottle and irrigate sinuses twice daily. 90 day supply   Patient not taking: Reported on 4/30/2025   Multiple Vitamin (MULTIVITAMIN) tablet  Self Yes No   Sig: Take 1 tablet by mouth daily.   albuterol (Ventolin HFA) 90 mcg/act inhaler  Self No No   Sig: Inhale 2 puffs every 6 (six) hours as needed for wheezing   atorvastatin (LIPITOR) 20 mg tablet  Self No No   Sig:  Take 1 tablet (20 mg total) by mouth daily   azelastine (ASTELIN) 0.1 % nasal spray  Self No No   Si sprays into each nostril daily Use in each nostril as directed   Patient not taking: Reported on 2025   calcium-vitamin D (OSCAL) 250-125 MG-UNIT per tablet  Self Yes No   Sig: Take 1 tablet by mouth daily.   famotidine (PEPCID) 40 MG tablet  Self No No   Sig: take 1 tablet by mouth daily at bedtime AS NEEDED FOR HEARTBURN   gabapentin (NEURONTIN) 300 mg capsule  Self No No   Sig: Take 2 capsules (600 mg total) by mouth daily at bedtime   irbesartan-hydrochlorothiazide (AVALIDE) 150-12.5 MG per tablet  Self No No   Sig: TAKE 1 TABLET BY MOUTH EVERY DAY   metFORMIN (GLUCOPHAGE) 1000 MG tablet  Self No No   Sig: TAKE 1 TABLET BY MOUTH TWICE A DAY WITH MEALS   montelukast (SINGULAIR) 10 mg tablet  Self No No   Sig: take 1 tablet by mouth once daily   sertraline (ZOLOFT) 100 mg tablet  Self No No   Sig: TAKE 1 TABLET BY MOUTH EVERY DAY   tacrolimus (PROTOPIC) 0.1 % ointment   Yes No   Sig: APPLY TO AFFECTED AREA TWICE A DAY FOR 2 WEEKS      Facility-Administered Medications: None       Allergies[5]    PHYSICAL EXAM    PE limited by: N/A     Objective   Vitals:   First set: Temperature: 98.2 °F (36.8 °C) (25 1743)  Pulse: 100 (25 1743)  Respirations: 18 (25 1743)  Blood Pressure: 133/80 (25 1743)  SpO2: 97 % (25 1743)    Primary Survey:   (A) Airway: Intact  (B) Breathing: Clear bilateral breath sounds  (C) Circulation: Pulses:   normal  (D) Disabliity:  GCS Total:  15  (E) Expose:  Completed    Secondary Survey: (Click on Physical Exam tab above)  Physical Exam  Vitals and nursing note reviewed.   Constitutional:       General: She is not in acute distress.     Appearance: Normal appearance. She is not ill-appearing or toxic-appearing.   HENT:      Head: Normocephalic and atraumatic.     Eyes:      General: No scleral icterus.     Extraocular Movements: Extraocular movements intact.        Cardiovascular:      Rate and Rhythm: Normal rate and regular rhythm.      Pulses: Normal pulses.      Heart sounds: Normal heart sounds. No murmur heard.  Pulmonary:      Effort: Pulmonary effort is normal. No respiratory distress.      Breath sounds: Normal breath sounds. No wheezing or rhonchi.   Abdominal:      General: Abdomen is flat. There is no distension.      Palpations: Abdomen is soft.      Tenderness: There is no abdominal tenderness.     Musculoskeletal:         General: Tenderness (left mid femur) present. No swelling or deformity. Normal range of motion.      Cervical back: Normal range of motion.     Skin:     General: Skin is warm.      Capillary Refill: Capillary refill takes less than 2 seconds.      Findings: Lesion (right knee abrasions.) present.     Neurological:      General: No focal deficit present.      Mental Status: She is alert.      Cranial Nerves: No cranial nerve deficit.      Sensory: No sensory deficit.      Motor: No weakness.      Gait: Gait normal.     Psychiatric:         Mood and Affect: Mood normal.         Behavior: Behavior normal.         Cervical spine cleared by clinical criteria? No (imaging required)      Invasive Devices       None                   Lab Results:   Results Reviewed       Procedure Component Value Units Date/Time    Fingerstick Glucose (POCT) [[id number]]  (Normal) Collected: 06/14/25 1558    Lab Status: Final result Specimen: Blood Updated: 06/14/25 1559     POC Glucose 123 mg/dl     Fingerstick Glucose (POCT) [[id number]]  (Normal) Collected: 06/14/25 1129    Lab Status: Final result Specimen: Blood Updated: 06/14/25 1130     POC Glucose 102 mg/dl     Fingerstick Glucose (POCT) [[id number]]  (Abnormal) Collected: 06/14/25 0744    Lab Status: Final result Specimen: Blood Updated: 06/14/25 0745     POC Glucose 190 mg/dl     Comprehensive metabolic panel [[id number]] Collected: 06/14/25 0059    Lab Status: Final result Specimen: Blood from Arm, Right  Updated: 06/14/25 0233     Sodium 138 mmol/L      Potassium 4.0 mmol/L      Chloride 103 mmol/L      CO2 24 mmol/L      ANION GAP 11 mmol/L      BUN 17 mg/dL      Creatinine 0.97 mg/dL      Glucose 121 mg/dL      Calcium 9.3 mg/dL      AST 16 U/L      ALT 12 U/L      Alkaline Phosphatase 86 U/L      Total Protein 6.8 g/dL      Albumin 4.1 g/dL      Total Bilirubin 0.65 mg/dL      eGFR 60 ml/min/1.73sq m     Narrative:      National Kid[employer] Disease Foundation guidelines for Chronic Kidney Disease (CKD):     Stage 1 with normal or high GFR (GFR > 90 mL/min/1.73 square meters)    Stage 2 Mild CKD (GFR = 60-89 mL/min/1.73 square meters)    Stage 3A Moderate CKD (GFR = 45-59 mL/min/1.73 square meters)    Stage 3B Moderate CKD (GFR = 30-44 mL/min/1.73 square meters)    Stage 4 Severe CKD (GFR = 15-29 mL/min/1.73 square meters)    Stage 5 End Stage CKD (GFR <15 mL/min/1.73 square meters)  Note: GFR calculation is accurate only with a steady state creatinine    CBC and differential [[id number]]  (Abnormal) Collected: 06/14/25 0059    Lab Status: Final result Specimen: Blood from Arm, Right Updated: 06/14/25 0209     WBC 11.91 Thousand/uL      RBC 3.81 Million/uL      Hemoglobin 11.8 g/dL      Hematocrit 35.8 %      MCV 94 fL      MCH 31.0 pg      MCHC 33.0 g/dL      RDW 11.8 %      MPV 10.4 fL      Platelets 298 Thousands/uL      nRBC 0 /100 WBCs      Segmented % 68 %      Immature Grans % 0 %      Lymphocytes % 15 %      Monocytes % 10 %      Eosinophils Relative 6 %      Basophils Relative 1 %      Absolute Neutrophils 8.20 Thousands/µL      Absolute Immature Grans 0.02 Thousand/uL      Absolute Lymphocytes 1.73 Thousands/µL      Absolute Monocytes 1.24 Thousand/µL      Eosinophils Absolute 0.65 Thousand/µL      Basophils Absolute 0.07 Thousands/µL                    Imaging Studies:   Direct to CT: Yes  CT lower extremity wo contrast left   Final Result by Jasiel Jimenez MD (06/13 2037)      No left hip/femoral  fracture.         Workstation performed: [id number]         XR knee 4+ vw right injury   ED Interpretation by Belgica Proctor MD (06/13 1855)   No acute osseous abnormality      Final Result by Wolf Mckee MD (06/14 5832)      No acute osseous abnormality.         Computerized Assisted Algorithm (CAA) may have been used to analyze all applicable images.         Workstation performed: [id number]         XR femur 2 views LEFT   ED Interpretation by Belgica Proctor MD (06/13 1855)   No acute osseous abnormality      Final Result by Wolf Mckee MD (06/14 3315)      Possible minimally displaced fracture of the anterior tibial plateau versus simulation of a fracture related to projection. The latter is favored as there is no joint effusion. Recommend correlation with physical exam for point tenderness and dedicated    left knee radiographs if indicated.         Computerized Assisted Algorithm (CAA) may have been used to analyze all applicable images.         Resident: Inder Walker I, the attending radiologist, have reviewed the images and agree with the final report above.      Workstation performed: [id number]         TRAUMA - CT head wo contrast   Final Result by Luis Magana MD (06/13 1825)      No intracranial hemorrhage or calvarial fracture.      The study was marked in EPIC for immediate notification per trauma protocol.            Workstation performed: [id number]         TRAUMA - CT spine cervical wo contrast   Final Result by Luis Magana MD (06/13 1824)      No cervical spine fracture or traumatic malalignment.      The study was marked in EPIC for immediate notification per trauma protocol.            Workstation performed: [id number]         XR knee 1 or 2 vw left    (Results Pending)         Procedures  Procedures         ED Course  ED Course as of 06/14/25 2142   Fri Jun 13, 2025   1750 Patient seen and evaluated by me immediately upon arrival to the emergency department as  a level C trauma due to head strike on aspirin.  Differential diagnosis includes but is not limited to traumatic intracranial injury, C-spine injury, doubt right knee fracture, doubt left hip/femur fracture  Workup and plan: CT head, CT C-spine, x-ray left femur, x-ray right knee   1828 TRAUMA - CT spine cervical wo contrast  IMPRESSION:     No cervical spine fracture or traumatic malalignment.     1828 TRAUMA - CT head wo contrast  IMPRESSION:     No intracranial hemorrhage or calvarial fracture.     1855 X-rays with no fracture on my interpretation.  Will attempt to ambulate patient and proceed with CT scan if unable to ambulate   2054 CT lower extremity wo contrast left  IMPRESSION:     No left hip/femoral fracture.     Sat Jun 14, 2025   0030 Patient still unable to ambulate with walker, and unable to ambulate with crutches safely.  Admitted to  IM for ambulatory dysfunction.           Medical Decision Making  Amount and/or Complexity of Data Reviewed  Radiology: ordered and independent interpretation performed. Decision-making details documented in ED Course.    Risk  Prescription drug management.  Decision regarding hospitalization.                Disposition  [facility] Transfer: No  Final diagnoses:   Ambulatory dysfunction   Muscle strain     Time reflects when diagnosis was documented in both MDM as applicable and the Disposition within this note       Time User Action Codes Description Comment    6/13/2025 11:21 PM Belgica Proctor Add [R26.2] Ambulatory dysfunction     6/13/2025 11:36 PM Belgica Proctor Add [T14.8XXA] Muscle strain     6/14/2025  4:17 PM Yue Bangura Add [W19.XXXA] Fall, initial encounter           ED Disposition       ED Disposition   Admit    Condition   Stable    Date/Time   Fri Jun 13, 2025 11:21 PM    Comment   --             Follow-up Information    None       Discharge Medication List as of 6/14/2025  7:06 PM        START taking these medications    Details   lidocaine  (LIDODERM) 5 % Apply 1 patch topically daily over 12 hours Remove & Discard patch within 12 hours or as directed by MD, Starting Sun 6/15/2025, Normal      methocarbamol (ROBAXIN) 500 mg tablet Take 1 tablet (500 mg total) by mouth 4 (four) times a day, Starting Sat 6/14/2025, Normal           CONTINUE these medications which have NOT CHANGED    Details   albuterol (Ventolin HFA) 90 mcg/act inhaler Inhale 2 puffs every 6 (six) hours as needed for wheezing, Starting Wed 9/18/2024, Normal      atorvastatin (LIPITOR) 20 mg tablet Take 1 tablet (20 mg total) by mouth daily, Starting Wed 3/19/2025, Normal      azelastine (ASTELIN) 0.1 % nasal spray 2 sprays into each nostril daily Use in each nostril as directed, Starting Tue 6/4/2024, Until Wed 6/4/2025, Normal      Budeson-Glycopyrrol-Formoterol (Breztri Aerosphere) 160-9-4.8 MCG/ACT AERO Inhale 2 puffs 2 (two) times a day Rinse mouth after use., Starting Wed 12/4/2024, No Print      calcium-vitamin D (OSCAL) 250-125 MG-UNIT per tablet Take 1 tablet by mouth daily., Until Discontinued, Historical Med      famotidine (PEPCID) 40 MG tablet take 1 tablet by mouth daily at bedtime AS NEEDED FOR HEARTBURN, Normal      gabapentin (NEURONTIN) 300 mg capsule Take 2 capsules (600 mg total) by mouth daily at bedtime, Starting Fri 1/31/2025, Normal      irbesartan-hydrochlorothiazide (AVALIDE) 150-12.5 MG per tablet TAKE 1 TABLET BY MOUTH EVERY DAY, Starting Fri 3/14/2025, Normal      metFORMIN (GLUCOPHAGE) 1000 MG tablet TAKE 1 TABLET BY MOUTH TWICE A DAY WITH MEALS, Starting Thu 1/23/2025, Normal      Mometasone Furoate POWD Dissolve 1 mg in Neilmed Sinus rinse bottle and irrigate sinuses twice daily. 90 day supply, Normal      montelukast (SINGULAIR) 10 mg tablet take 1 tablet by mouth once daily, Normal      Multiple Vitamin (MULTIVITAMIN) tablet Take 1 tablet by mouth daily., Until Discontinued, Historical Med      sertraline (ZOLOFT) 100 mg tablet TAKE 1 TABLET BY MOUTH  EVERY DAY, Starting Fri 4/11/2025, Normal      tacrolimus (PROTOPIC) 0.1 % ointment APPLY TO AFFECTED AREA TWICE A DAY FOR 2 WEEKS, Historical Med           Outpatient Discharge Orders   Ambulatory Referral to Physical Therapy   Standing Status: Future Standing Exp. Date: 06/14/26      Ambulatory Referral to Physical Therapy   Standing Status: Future Standing Exp. Date: 06/14/26      Discharge Diet     Discharge Diet     Activity as tolerated     Activity as tolerated       PDMP Review         Value Time User    PDMP Reviewed  Yes 1/8/2025  2:51 PM Yvan Blackman MD            ED Provider  Electronically Signed by             [1]   Past Medical History:  Diagnosis Date    Chronic cough 05/15/2018    Colon polyp     Diabetes mellitus (HCC)     Eczema     Gallbladder disease     Hyperlipidemia     Hypertension     Insomnia     Nasal polyps 2024   [2]   Family History  Problem Relation Name Age of Onset    Heart attack Father          acute MI    Cancer Sister Christelle         R/t Mad Cow disease    Neuropathy Mother      Emphysema Sister      No Known Problems Sister      No Known Problems Sister     [3]   Past Surgical History:  Procedure Laterality Date    CHOLECYSTECTOMY      COLONOSCOPY      NASAL SEPTUM SURGERY      nasal septal deviation repair    MD ESOPHAGOGASTRODUODENOSCOPY TRANSORAL DIAGNOSTIC N/A 2/25/2016    Procedure: EGD AND COLONOSCOPY;  Surgeon: Kenny Irving MD;  Location: [facility];  Service: Gastroenterology   [4]   Social History  Tobacco Use    Smoking status: Never    Smokeless tobacco: Never    Tobacco comments:     no known exposure to tobacco smoke   Vaping Use    Vaping status: Never Used   Substance Use Topics    Alcohol use: Not Currently    Drug use: Never   [5]   Allergies  Allergen Reactions    Augmentin [Amoxicillin-Pot Clavulanate] Abdominal Pain     Nausea, feels terrible    Cephalexin      Annotation - [id number]: n/v        Belgica Proctor MD  06/14/25 6655

## 2025-06-14 NOTE — ASSESSMENT & PLAN NOTE
Lab Results   Component Value Date    HGBA1C 5.8 (H) 04/26/2025       Recent Labs     06/14/25  0744 06/14/25  1129   POCGLU 190* 102       Blood Sugar Average: Last 72 hrs:  (P) 146  Sliding scale insulin while hospitalized  Resume metformin on discharge

## 2025-06-16 ENCOUNTER — TRANSITIONAL CARE MANAGEMENT (OUTPATIENT)
Dept: FAMILY MEDICINE CLINIC | Facility: CLINIC | Age: 70
End: 2025-06-16

## 2025-06-18 ENCOUNTER — OFFICE VISIT (OUTPATIENT)
Dept: FAMILY MEDICINE CLINIC | Facility: CLINIC | Age: 70
End: 2025-06-18
Payer: MEDICARE

## 2025-06-18 VITALS
SYSTOLIC BLOOD PRESSURE: 118 MMHG | TEMPERATURE: 98.7 F | OXYGEN SATURATION: 95 % | HEART RATE: 91 BPM | DIASTOLIC BLOOD PRESSURE: 72 MMHG | HEIGHT: 66 IN | BODY MASS INDEX: 28.7 KG/M2 | RESPIRATION RATE: 18 BRPM | WEIGHT: 178.6 LBS
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
## 2025-06-18 DIAGNOSIS — S76.312A STRAIN OF LEFT HAMSTRING MUSCLE, INITIAL ENCOUNTER: ICD-10-CM

## 2025-06-18 DIAGNOSIS — R26.2 AMBULATORY DYSFUNCTION: ICD-10-CM

## 2025-06-18 DIAGNOSIS — E11.9 TYPE 2 DIABETES MELLITUS WITHOUT COMPLICATION, WITHOUT LONG-TERM CURRENT USE OF INSULIN (HCC): ICD-10-CM

## 2025-06-18 DIAGNOSIS — Z09 HOSPITAL DISCHARGE FOLLOW-UP: Primary | ICD-10-CM

## 2025-06-18 DIAGNOSIS — R05.3 CHRONIC COUGH: ICD-10-CM

## 2025-06-18 LAB
CREAT UR-MCNC: 50.7 MG/DL
MICROALBUMIN UR-MCNC: <7 MG/L

## 2025-06-18 PROCEDURE — 82043 UR ALBUMIN QUANTITATIVE: CPT | Performed by: NURSE PRACTITIONER

## 2025-06-18 PROCEDURE — 82570 ASSAY OF URINE CREATININE: CPT | Performed by: NURSE PRACTITIONER

## 2025-06-18 PROCEDURE — 99495 TRANSJ CARE MGMT MOD F2F 14D: CPT | Performed by: NURSE PRACTITIONER

## 2025-06-18 RX ORDER — MONTELUKAST SODIUM 10 MG/1
10 TABLET ORAL DAILY
Qty: 90 TABLET | Refills: 1 | Status: SHIPPED | OUTPATIENT
Start: 2025-06-18

## 2025-06-18 NOTE — ASSESSMENT & PLAN NOTE
Lab Results   Component Value Date    HGBA1C 5.8 (H) 04/26/2025     Stable with A1C at 5.8  Continue metformin 1000mg daily  She is on statin therapy   Eye exam with [facility] (note is in Media)  Foot exam is UTD           Orders:    Albumin / creatinine urine ratio

## 2025-06-18 NOTE — PROGRESS NOTES
Transition of Care Visit:  Name: Judit Sawyer      : 1955      MRN: [id number]  Encounter Provider: AARTI Smith  Encounter Date: 2025   Encounter department: [facility]    Assessment & Plan  Hospital discharge follow-up         Strain of left hamstring muscle, initial encounter  Hospital records reviewed today   Improving overall  WBAT LLE  Continue to use walker for ambulation  Fall precautions  PT starting 25  Continue with OTC Tylenol and/or Aleve prn         Fall, initial encounter  Hospital records reviewed today   Mechanical fall- slipped on the edge of a curb, landed on her left leg  Fall precautions          Ambulatory dysfunction  Due to recent fall and left leg injury   Fall precautions  PT starting 25  Use walker for ambulation          Type 2 diabetes mellitus without complication, without long-term current use of insulin ([facility])    Lab Results   Component Value Date    HGBA1C 5.8 (H) 2025     Stable with A1C at 5.8  Continue metformin 1000mg daily  She is on statin therapy   Eye exam with [facility] (note is in Media)  Foot exam is UTD           Orders:    Albumin / creatinine urine ratio     Chronic cough    Orders:    montelukast (SINGULAIR) 10 mg tablet; Take 1 tablet (10 mg total) by mouth daily         History of Present Illness     Pt presents by herself today for a TCM  Admitted to [facility] 25-25 following a fall.  Pt was on a walk with her , stepped of the edge of a curb which caused a mechanical fall with head strike.  Hit the back of her head on the sidewalk, also landed on her left hip.  No LOC.  +ASA.    Imaging Studies:   Direct to CT: Yes  CT lower extremity wo contrast left   Final Result by Jasiel Jimenez MD ([id number])       No left hip/femoral fracture.           Workstation performed: [id number]           XR knee 4+ vw right injury   ED Interpretation by Belgica Proctor MD ([id number])    No acute osseous abnormality       Final Result by Wolf Mckee MD (06/14 4735)       No acute osseous abnormality.           Computerized Assisted Algorithm (CAA) may have been used to analyze all applicable images.           Workstation performed: [id number]           XR femur 2 views LEFT   ED Interpretation by Belgica Proctor MD (06/13 8908)   No acute osseous abnormality       Final Result by Wolf Mckee MD (06/14 6824)       Possible minimally displaced fracture of the anterior tibial plateau versus simulation of a fracture related to projection. The latter is favored as there is no joint effusion. Recommend correlation with physical exam for point tenderness and dedicated    left knee radiographs if indicated.           Computerized Assisted Algorithm (CAA) may have been used to analyze all applicable images.           Resident: Inder Walker I, the attending radiologist, have reviewed the images and agree with the final report above.       Workstation performed: [id number]           TRAUMA - CT head wo contrast   Final Result by Luis Magana MD (06/13 1825)       No intracranial hemorrhage or calvarial fracture.       The study was marked in EPIC for immediate notification per trauma protocol.               Workstation performed: [id number]           TRAUMA - CT spine cervical wo contrast   Final Result by Luis Magana MD (06/13 1824)       No cervical spine fracture or traumatic malalignment.       The study was marked in EPIC for immediate notification per trauma protocol.               Workstation performed: [id number]           Pt was unable to ambulate with crutches or walker and was admitted.  PT recommended outpatient PT.  Pt was advised of rest, ice, Aleve or Tylenol, Lidocaine patches. Discharged to home    Today, Judit notes she is slowly improving.  She has been ambulating well with her walker.  She has been able to go in and out of her house which requires 4  steps.  She is doing well with this.  Pain is controlled in her left leg with Tylenol and Aleve prn.  A little more uncomfortable at night.  Denies leg weakness, numbness or tingling.  Denies leg swelling. She will be starting PT next week on 6/24/25        Transitional Care Management Review:   Judit Sawyer is a 69 y.o. female here for TCM follow up.     During the TCM phone call patient stated:  TCM Call (since 6/4/2025)       Date and time call was made  6/16/2025 10:46 AM    Patient was hospitialized at  [facility]    Date of Admission  06/13/25    Date of discharge  06/14/25    Diagnosis  Ambulatory dysfunction    Disposition  Home    Were the patients medications reviewed and updated  Yes    Current Symptoms  None          TCM Call (since 6/4/2025)       Post hospital issues  None    Scheduled for follow up?  Yes    Did you obtain your prescribed medications  Yes    Do you need help managing your prescriptions or medications  No    Is transportation to your appointment needed  No    I have advised the patient to call PCP with any new or worsening symptoms  Bhumika Hughes     Living Arrangements  Family members    Support System  Family    Do you have social support  Yes, as much as I need    Are you recieving home care services  Yes          HPI  Review of Systems   Constitutional:  Negative for activity change, appetite change, chills, diaphoresis, fatigue, fever and unexpected weight change.   Eyes:  Negative for visual disturbance.   Respiratory:  Negative for cough, chest tightness, shortness of breath and wheezing.    Cardiovascular:  Negative for chest pain, palpitations and leg swelling.   Gastrointestinal:  Negative for abdominal pain, blood in stool, constipation, diarrhea and nausea.   Genitourinary:  Negative for dysuria.   Musculoskeletal:  Positive for arthralgias and gait problem. Negative for myalgias.   Skin:  Negative for rash and wound.   Neurological:   "Negative for dizziness, weakness, numbness and headaches.   Psychiatric/Behavioral:  Negative for dysphoric mood, self-injury, sleep disturbance and suicidal ideas. The patient is not nervous/anxious.      Objective   /72   Pulse 91   Temp 98.7 °F (37.1 °C) (Tympanic)   Resp 18   Ht 5' 6\" (1.676 m)   Wt 81 kg (178 lb 9.6 oz)   SpO2 95%   BMI 28.83 kg/m²     Physical Exam  Constitutional:       General: She is not in acute distress.     Appearance: Normal appearance. She is well-developed. She is not ill-appearing, toxic-appearing or diaphoretic.   HENT:      Head: Normocephalic and atraumatic.     Eyes:      Extraocular Movements: Extraocular movements intact.      Conjunctiva/sclera: Conjunctivae normal.      Pupils: Pupils are equal, round, and reactive to light.     Neck:      Thyroid: No thyromegaly.     Cardiovascular:      Rate and Rhythm: Normal rate and regular rhythm.      Heart sounds: Normal heart sounds. No murmur heard.  Pulmonary:      Effort: Pulmonary effort is normal. No respiratory distress.      Breath sounds: Normal breath sounds. No wheezing.   Abdominal:      General: Bowel sounds are normal. There is no distension.      Palpations: Abdomen is soft.      Tenderness: There is no abdominal tenderness.     Musculoskeletal:         General: Normal range of motion.      Cervical back: Normal range of motion and neck supple. No rigidity or tenderness.      Comments: Left hip (mostly posterior) and left thigh with diffuse tenderness on palpation.  Mild ecchymosis/  No inflammation.  Normal sensation.  Antalgic gait.  Using a walker for ambulation    Lymphadenopathy:      Cervical: No cervical adenopathy.     Skin:     General: Skin is warm and dry.     Neurological:      General: No focal deficit present.      Mental Status: She is alert and oriented to person, place, and time.     Psychiatric:         Mood and Affect: Mood normal.         Behavior: Behavior normal.         Thought " Content: Thought content normal.         Judgment: Judgment normal.       Medications have been reviewed by provider in current encounter

## 2025-06-19 NOTE — ASSESSMENT & PLAN NOTE
Due to recent fall and left leg injury   Fall precautions  PT starting 6/24/25  Use walker for ambulation

## 2025-06-19 NOTE — ASSESSMENT & PLAN NOTE
Hospital records reviewed today   Mechanical fall- slipped on the edge of a curb, landed on her left leg  Fall precautions

## 2025-06-19 NOTE — ASSESSMENT & PLAN NOTE
Hospital records reviewed today   Improving overall  WBAT LLE  Continue to use walker for ambulation  Fall precautions  PT starting 6/24/25  Continue with OTC Tylenol and/or Aleve prn

## 2025-06-24 ENCOUNTER — EVALUATION (OUTPATIENT)
Dept: PHYSICAL THERAPY | Age: 70
End: 2025-06-24
Payer: MEDICARE

## 2025-06-24 DIAGNOSIS — R26.2 AMBULATORY DYSFUNCTION: ICD-10-CM

## 2025-06-24 PROCEDURE — 97161 PT EVAL LOW COMPLEX 20 MIN: CPT | Performed by: PHYSICAL THERAPIST

## 2025-06-24 PROCEDURE — 97110 THERAPEUTIC EXERCISES: CPT | Performed by: PHYSICAL THERAPIST

## 2025-06-24 NOTE — PROGRESS NOTES
PT Evaluation     Today's date: 2025  Patient name: Judit Sawyer  : 1955  MRN: [id number]  Referring provider: Yue Bangura PA-C  Dx:   Encounter Diagnosis     ICD-10-CM    1. Ambulatory dysfunction  R26.2 Ambulatory Referral to Physical Therapy                     Assessment  Impairments: abnormal gait, abnormal or restricted ROM, activity intolerance, impaired balance, impaired physical strength, lacks appropriate home exercise program, pain with function, weight-bearing intolerance, poor posture  and poor body mechanics  Functional limitations: Increased pain with sitting, sit>stand and stair climbing. Performs in step to pattern for ascending/descending    Assessment details: Patient seen for PT evaluation post L hamstring strain and leg pain post fall. Patient presents with decreased hip ROM and LE strength, gait and balance deficits, no formal HEP to address her pain or gait pattern as she's using a SPC now.   PT reviewed stair climbing with patient, taught step to pattern with single hand rail and with SPC use in opposite hand. Patient is an excellent candidate for PT.     Understanding of Dx/Px/POC: good     Prognosis: good    Goals  Short Term goals - 4 weeks  1.  Patient will be independent HEP.    2.  Patient will report a 50% decrease in pain complaints.   3.  Increase strength 1/2 grade.  4.  Increase ROM 5-10 degrees.  5.  Wean from assisted device use.     Long Term goals - 8 weeks  1.  Patient will report elimination of pain complaints.  2.  Patient will return to all work related activities without restriction.  3.  Patient will return to all recreational activities without restriction.  4.  ROM WFL.  5.  Strength 5/5.      Plan  Patient would benefit from: skilled physical therapy  Planned modality interventions: cryotherapy, thermotherapy: hydrocollator packs and unattended electrical stimulation    Planned therapy interventions: IASTM, joint mobilization, kinesiology  taping, manual therapy, abdominal trunk stabilization, neuromuscular re-education, patient/caregiver education, postural training, balance, body mechanics training, home exercise program, strengthening, stretching, therapeutic activities and therapeutic exercise    Frequency: 1-2x week  Duration in weeks: 8  Treatment plan discussed with: patient        Subjective Evaluation    History of Present Illness  Mechanism of injury: Patient missed the curb and fell immediately onto L side. Patient was taken to the hospital, CT of the head negative (as patient has had a small head strike), x ray negative for fractures, bruising at the upper thigh.  Patient reports that her pain is steadily improving; however, still painful with sitting. Patient's goal is to get better for her upcoming vacations in end of July and 2nd week of August.     Patient initially ambulating with RW, and has progressed to using SPC at this time.  Patient currently lives with spouse in 2 story home with bedroom on the 2nd floor, bathroom on the first floor. Patient has only recently started ascending/descending the stairs; still nervous with descending > ascending stairs.   Patient Goals  Patient goals for therapy: decreased pain, improved balance, increased motion, increased strength and independence with ADLs/IADLs    Pain  Current pain ratin  At best pain ratin  At worst pain ratin  Location: L posterior thigh  Quality: sharp, radiating, throbbing, cramping, discomfort and dull ache  Relieving factors: ice and heat  Aggravating factors: sitting, stair climbing and walking  Progression: no change    Social Support  Steps to enter house: yes  Stairs in house: yes   Lives in: multiple-level home  Lives with: spouse    Employment status: not working    Diagnostic Tests  X-ray: normal  CT scan: normal  Treatments  No previous or current treatments      Objective     Lumbar Screen  Lumbar range of motion within normal limits with the  following exceptions:Moderate limitations 2* increased hamstring pain.     Neurological Testing     Sensation     Hip   Left Hip   Intact: light touch    Active Range of Motion   Left Hip   Flexion: 90 degrees with pain  Abduction: 40 degrees with pain  External rotation (90/90): 30 degrees with pain  Internal rotation (90/90): 25 degrees with pain    Right Hip   Normal active range of motion    Strength/Myotome Testing     Left Hip   Planes of Motion   Flexion: 3+  Extension: 3+  Abduction: 3+  Adduction: 3+    Right Hip   Normal muscle strength    Tests     Left Hip   90/90 SLR: Positive.   SLR: Positive.              Precautions: FALL RISK  Low tolerance to pain      Manuals 6/24                                  Neuro Re-Ed              Progress to when ready:       SS       Tandem walking                            Ther Ex              Warm up:       nustep                     Standing TE:       SLR x 3       Ham curls       HR       marches              Step ups FW- progress to                     Seated TE:              LAQ       Ball squeeze       Hip abd TB                     Mat TE:       90/90 hamstring stretch, with towel 2x:10       SKTC with towel 2x:10      SLR flex       SAQ?                                   Ther Activity                     Gait Training       With or without assisted device              Modalities       CP vs ELIECER PRN

## 2025-06-24 NOTE — HOME EXERCISE EDUCATION
Program_ID:[id number]   Access Code: [email or web address]  URL: [email or web address]  Date: 06-  Prepared By: Gaby Aleman    Program Notes      Exercises      - Hooklying Single Knee to Chest Stretch with Towel - 1-2 x daily -  x weekly -  sets - 5 reps - 10 sec hold      - Supine Hamstring Stretch - 1-2 x daily -  x weekly -  sets - 5 reps - 10 sec hold      - Seated Long Arc Quad - 1 x daily -  x weekly -  sets - 10 reps - 3 sec hold

## 2025-06-27 ENCOUNTER — OFFICE VISIT (OUTPATIENT)
Dept: PHYSICAL THERAPY | Age: 70
End: 2025-06-27
Payer: MEDICARE

## 2025-06-27 DIAGNOSIS — R26.2 AMBULATORY DYSFUNCTION: Primary | ICD-10-CM

## 2025-06-27 PROCEDURE — 97110 THERAPEUTIC EXERCISES: CPT

## 2025-06-27 NOTE — PROGRESS NOTES
"Daily Note     Today's date: 2025  Patient name: Judit Sawyer  : 1955  MRN: [id number]  Referring provider: Yue Bangura PA-C  Dx:   Encounter Diagnosis     ICD-10-CM    1. Ambulatory dysfunction  R26.2           Start Time: 1030          Subjective: patient stated she has been doing better but still sore      Objective: See treatment diary below      Assessment: Educated patient on POC established for their specific diagnosis and their LOF. Verbal and visual cues required for proper execution of exercise with program. Overall tolerated treatment well. Will continue to progress in upcoming visits as able      Plan: Continue per plan of care.  Progress treatment as tolerated.       Precautions: FALL RISK  Low tolerance to pain      Manuals                                  Neuro Re-Ed              Progress to when ready:       SS       Tandem walking                            Ther Ex              Warm up:       nustep  L1 10 min                    Standing TE:       SLR x 3  10x     Ham curls  10x     HR  10x     marches  10x            Step ups FW- progress to                     Seated TE:              LAQ       Ball squeeze  15x:05\"hd     Hip abd TB  15x:03 red band                    Mat TE:       90/90 hamstring stretch, with towel 2x:10  4x:10\"     SKTC with towel 2x:10 4x:10\"     SLR flex       SAQ?  10x:05\"hd                                 Ther Activity                     Gait Training       With or without assisted device              Modalities       CP vs MH PRN                       "

## 2025-06-30 ENCOUNTER — OFFICE VISIT (OUTPATIENT)
Dept: PHYSICAL THERAPY | Age: 70
End: 2025-06-30
Payer: MEDICARE

## 2025-06-30 DIAGNOSIS — R26.2 AMBULATORY DYSFUNCTION: Primary | ICD-10-CM

## 2025-06-30 PROCEDURE — 97110 THERAPEUTIC EXERCISES: CPT

## 2025-06-30 NOTE — PROGRESS NOTES
"Daily Note     Today's date: 2025  Patient name: Judit Sawyer  : 1955  MRN: [id number]  Referring provider: Yue Bangura PA-C  Dx:   Encounter Diagnosis     ICD-10-CM    1. Ambulatory dysfunction  R26.2           Start Time: 815          Subjective: patient stated she was a little sore after last visit but otherwise okay. She did say it is still sore in the bruised areas.       Objective: See treatment diary below      Assessment: Patient able to progress with repetitions  with various exercises and added 2 more. Good tolerance with progression with minimal cues required. Patient continues to present with deficits with strength and ROM requiring continued skilled physical therapy      Plan: Continue per plan of care.  Progress treatment as tolerated.       Precautions: FALL RISK  Low tolerance to pain      Manuals                                   Neuro Re-Ed              Progress to when ready:       SS       Tandem walking                            Ther Ex              Warm up:       nustep  L1 10 min  L1 10                  Standing TE:       SLR x 3  10x 15x    Ham curls  10x 15x    HR  10x 15x    marches  10x 15x           Step ups FW- progress to              Side steps   6x 10 ft    Monster walk   6x 10 ft           Seated TE:              LAQ       Ball squeeze  15x:05\"hd 15x:05\" hd    Hip abd TB  15x:03 red band  15x:03\"                  Mat TE:       90/90 hamstring stretch, with towel 2x:10  4x:10\" 4x:10\"hd    SKTC with towel 2x:10 4x:10\" 4x:10\"hd    SLR flex       SAQ  10x:05\"hd 15x:05\"hd                                Ther Activity                     Gait Training       With or without assisted device              Modalities       CP vs MH PRN                         "

## 2025-07-03 ENCOUNTER — OFFICE VISIT (OUTPATIENT)
Dept: PHYSICAL THERAPY | Age: 70
End: 2025-07-03
Payer: MEDICARE

## 2025-07-03 DIAGNOSIS — R26.2 AMBULATORY DYSFUNCTION: Primary | ICD-10-CM

## 2025-07-03 PROCEDURE — 97110 THERAPEUTIC EXERCISES: CPT

## 2025-07-03 NOTE — PROGRESS NOTES
"Daily Note     Today's date: 7/3/2025  Patient name: Judit Sawyer  : 1955  MRN: [id number]  Referring provider: Yue Bangura PA-C  Dx:   Encounter Diagnosis     ICD-10-CM    1. Ambulatory dysfunction  R26.2                      Subjective: Patient states her the L posterior knee has been bothering her.       Objective: See treatment diary below      Assessment: Tolerated treatment fair. Showing progress each week with therapy and good compliance to HEP as well. Patient would benefit from continued PT      Plan: Continue per plan of care.      Precautions: FALL RISK  Low tolerance to pain      Manuals 6/24 6/27 6/30   7/3                               Neuro Re-Ed              Progress to when ready:       SS       Tandem walking                            Ther Ex              Warm up:       nustep  L1 10 min  L1 10 L 1 x 10                  Standing TE:       SLR x 3  10x 15x X 15    Ham curls  10x 15x X 15   HR  10x 15x X 15   marches  10x 15x X 15          Step ups FW- progress to              Side steps   6x 10 ft 6 x 10 ft   Monster walk   6x 10 ft 6 x 10 ft          Seated TE:              LAQ       Ball squeeze  15x:05\"hd 15x:05\" hd 15 x 5\"   Hip abd TB  15x:03 red band  15x:03\" 15 x 3\"                  Mat TE:       90/90 hamstring stretch, with towel 2x:10  4x:10\" 4x:10\"hd 4 x 10\"   SKTC with towel 2x:10 4x:10\" 4x:10\"hd 4 x 10\"   SLR flex       SAQ  10x:05\"hd 15x:05\"hd 15 x 5\"                               Ther Activity                     Gait Training       With or without assisted device              Modalities       CP vs MH PRN                           "

## 2025-07-07 ENCOUNTER — OFFICE VISIT (OUTPATIENT)
Dept: PHYSICAL THERAPY | Age: 70
End: 2025-07-07
Payer: MEDICARE

## 2025-07-07 DIAGNOSIS — R26.2 AMBULATORY DYSFUNCTION: Primary | ICD-10-CM

## 2025-07-07 PROCEDURE — 97110 THERAPEUTIC EXERCISES: CPT

## 2025-07-07 NOTE — PROGRESS NOTES
"Daily Note     Today's date: 2025  Patient name: Judit Sawyer  : 1955  MRN: [id number]  Referring provider: Yue Bangura PA-C  Dx:   Encounter Diagnosis     ICD-10-CM    1. Ambulatory dysfunction  R26.2           Start Time: 1400          Subjective: patient stated she feels like the walking is getting better. She said she does not have to hold onto railings as much. She did say her knees were a little sore from       Objective: See treatment diary below      Assessment: Patient able to progress with repetitions and resistance with various exercises. Good tolerance with progression with minimal cues required. Patient continues to present with deficits with strength and ROM requiring continued skilled physical therapy      Plan: Continue per plan of care.  Progress treatment as tolerated.       Precautions: FALL RISK  Low tolerance to pain      Manuals 7/7  6/30   7/3                               Neuro Re-Ed              Progress to when ready:       SS       Tandem walking                            Ther Ex              Warm up:       nustep L2 10 min  L1 10 L 1 x 10                  Standing TE:       SLR x 3 2x10 Add wt 15x X 15    Ham curls 2x10 Add wt  15x X 15   HR 20x  15x X 15   marches 2x10 Add wt  15x X 15          Step ups FW- progress to 10x  Stairway              Side steps 8x 10 ft   6x 10 ft 6 x 10 ft   Monster walk 8x 10 ft  6x 10 ft 6 x 10 ft          Seated TE:              LAQ 15x:05\"hd      Ball squeeze 15x:05\"  15x:05\" hd 15 x 5\"   Hip abd TB Red 15x:03\" hd   15x:03\" 15 x 3\"                  Mat TE:       90/90 hamstring stretch, with towel 2x:10   4x:10\"hd 4 x 10\"   SKTC with towel 2x:10  4x:10\"hd 4 x 10\"   SLR flex       SAQ 15x:05\"hd Add wt 15x:05\"hd 15 x 5\"                               Ther Activity                     Gait Training       With or without assisted device              Modalities       CP vs MH PRN                             "

## 2025-07-09 ENCOUNTER — OFFICE VISIT (OUTPATIENT)
Dept: PHYSICAL THERAPY | Age: 70
End: 2025-07-09
Payer: MEDICARE

## 2025-07-09 DIAGNOSIS — R26.2 AMBULATORY DYSFUNCTION: Primary | ICD-10-CM

## 2025-07-09 DIAGNOSIS — E11.59 HYPERTENSION ASSOCIATED WITH DIABETES  (HCC): ICD-10-CM

## 2025-07-09 DIAGNOSIS — G25.81 RESTLESS LEGS SYNDROME: ICD-10-CM

## 2025-07-09 DIAGNOSIS — I15.2 HYPERTENSION ASSOCIATED WITH DIABETES  (HCC): ICD-10-CM

## 2025-07-09 PROCEDURE — 97110 THERAPEUTIC EXERCISES: CPT

## 2025-07-09 NOTE — PROGRESS NOTES
"Daily Note     Today's date: 2025  Patient name: Judit Sawyer  : 1955  MRN: [id number]  Referring provider: Yue Bangura PA-C  Dx:   Encounter Diagnosis     ICD-10-CM    1. Ambulatory dysfunction  R26.2           Start Time: 0900          Subjective: patient offered no new complaints      Objective: See treatment diary below      Assessment: Patient able to progress with repetitions and resistance with various exercises. Good tolerance with progression with minimal cues required. Patient continues to present with deficits with strength and ROM requiring continued skilled physical therapy      Plan: Continue per plan of care.  Progress treatment as tolerated.       Precautions: FALL RISK  Low tolerance to pain      Manuals 7/7 7/9  7/3                               Neuro Re-Ed              Progress to when ready:       SS       Tandem walking                            Ther Ex              Warm up:       nustep L2 10 min L3 10 min  L 1 x 10                  Standing TE:       SLR x 3 2x10 1.5 lbs 2x10  X 15    Ham curls 2x10 1.5 lbs 2x10  X 15   HR 20x 20x  X 15   marches 2x10 1.5 lbs 2x10  X 15          Step ups FW- progress to 10x  Stairway  10x stairway            Side steps 8x 10 ft  8x 10 ft  6 x 10 ft   Monster walk 8x 10 ft 8x 10 ft  6 x 10 ft          Seated TE:              LAQ 15x:05\"hd 15x:05\" hd      Ball squeeze 15x:05\" 15x:5\"HD  15 x 5\"   Hip abd TB Red 15x:03\" hd  RED 15x:3\"hd  15 x 3\"                  Mat TE:       90/90 hamstring stretch, with towel 2x:10  4x:10\"hd  4 x 10\"   SKTC with towel 2x:10 4x:10\"hd  4 x 10\"   SLR flex       SAQ 15x:05\"hd 1.5 lbs 2x10  15 x 5\"                               Ther Activity                     Gait Training       With or without assisted device              Modalities       CP vs MH PRN                               "

## 2025-07-10 RX ORDER — IRBESARTAN AND HYDROCHLOROTHIAZIDE 150; 12.5 MG/1; MG/1
1 TABLET, FILM COATED ORAL DAILY
Qty: 90 TABLET | Refills: 1 | Status: SHIPPED | OUTPATIENT
Start: 2025-07-10

## 2025-07-11 RX ORDER — GABAPENTIN 300 MG/1
600 CAPSULE ORAL
Qty: 180 CAPSULE | Refills: 1 | Status: SHIPPED | OUTPATIENT
Start: 2025-07-11

## 2025-07-14 ENCOUNTER — OFFICE VISIT (OUTPATIENT)
Dept: PHYSICAL THERAPY | Age: 70
End: 2025-07-14
Payer: MEDICARE

## 2025-07-14 DIAGNOSIS — R26.2 AMBULATORY DYSFUNCTION: Primary | ICD-10-CM

## 2025-07-14 PROCEDURE — 97110 THERAPEUTIC EXERCISES: CPT

## 2025-07-14 NOTE — PROGRESS NOTES
"Daily Note     Today's date: 2025  Patient name: Judit Sawyer  : 1955  MRN: [id number]  Referring provider: Yue Bangura PA-C  Dx:   Encounter Diagnosis     ICD-10-CM    1. Ambulatory dysfunction  R26.2           Start Time: 0850          Subjective: patient stated he trip is a little over one week. She said she has been feeling good and the black and blue marks are all gone.       Objective: See treatment diary below      Assessment: Patient progressing well towards goals. Good execution of exercise with program. Patient demonstrated the appropriate amount of fatigue for program.  Will continue to monitor pain levels and progress as able      Plan: Continue per plan of care.  Progress treatment as tolerated.       Precautions: FALL RISK  Low tolerance to pain      Manuals                                 Neuro Re-Ed              Progress to when ready:       SS       Tandem walking                            Ther Ex              Warm up:       nustep L2 10 min L3 10 min L3 10 min                   Standing TE:       SLR x 3 2x10 1.5 lbs 2x10 2 lbs 2x10    Ham curls 2x10 1.5 lbs 2x10 2 lbs 2x10    HR 20x 20x 20x     marches 2x10 1.5 lbs 2x10 2 lba 2x10           Step ups FW- progress to 10x  Stairway  10x stairway 15x stairway            Side steps 8x 10 ft  8x 10 ft YTB 8x 10 ft    Monster walk 8x 10 ft 8x 10 ft YTB 8x 10 ft           Seated TE:              LAQ 15x:05\"hd 15x:05\" hd  15x:05\" hd     Ball squeeze 15x:05\" 15x:5\"HD 15x:5\"HD    Hip abd TB Red 15x:03\" hd  RED 15x:3\"hd RED 15x:3\"hd                  Mat TE:       90/90 hamstring stretch, with towel 2x:10  4x:10\"hd 4x:10\"hd    SKTC with towel 2x:10 4x:10\"hd 4x:10\"hd    SLR flex       SAQ 15x:05\"hd 1.5 lbs 2x10 -                                Ther Activity                     Gait Training       With or without assisted device              Modalities       CP vs MH PRN                                 "

## 2025-07-16 ENCOUNTER — OFFICE VISIT (OUTPATIENT)
Dept: PHYSICAL THERAPY | Age: 70
End: 2025-07-16
Payer: MEDICARE

## 2025-07-16 DIAGNOSIS — R26.2 AMBULATORY DYSFUNCTION: Primary | ICD-10-CM

## 2025-07-16 PROCEDURE — 97112 NEUROMUSCULAR REEDUCATION: CPT | Performed by: PHYSICAL THERAPIST

## 2025-07-16 PROCEDURE — 97110 THERAPEUTIC EXERCISES: CPT | Performed by: PHYSICAL THERAPIST

## 2025-07-16 NOTE — PROGRESS NOTES
PT Re-Evaluation     Today's date: 2025  Patient name: Judit Sawyer  : 1955  MRN: [id number]  Referring provider: Yue Bangura PA-C  Dx:   Encounter Diagnosis     ICD-10-CM    1. Ambulatory dysfunction  R26.2                      Assessment  Impairments: abnormal gait, abnormal or restricted ROM, activity intolerance, impaired balance, impaired physical strength, lacks appropriate home exercise program, pain with function, weight-bearing intolerance, poor posture  and poor body mechanics  Functional limitations: Patient's pain has reduced with stair climbing, walking, standing, sit<>stand transfers; however, cautioned patient with heavy lifting/bending to ease into her previous level of activity as she's able.    Assessment details: Patient seen for PT re-assessment. Patient reports that she has been feeling a steady improvement in her LE pain has significantly reduced and is still painful with sitting on hard surfaces or a chair that stops short at mid thigh - pressing into the hamstring. Patient has been compliant with her HEP at home, has made excellent progress her ROM and pain; still weak at L hip and would benefit to continue with PT for 1-2x/week with HEP progression in preparation for patient's vacation/possible discharge to [facility] with patient following up with PT post vacation.   Patient is an excellent candidate to continue with PT at this time.     Thank you for your referrals.     Understanding of Dx/Px/POC: good     Prognosis: good    Goals  Short Term goals - 4 weeks  1.  Patient will be independent HEP.  met  2.  Patient will report a 50% decrease in pain complaints.  met  3.  Increase strength 1/2 grade. progressing  4.  Increase ROM 5-10 degrees. met  5.  Wean from assisted device use. met    Long Term goals - 8 weeks  1.  Patient will report elimination of pain complaints. progressing  2.  Patient will return to all work related activities without restriction. progressing  3.   Patient will return to all recreational activities without restriction. progressing  4.  ROM WFL. progressing  5.  Strength 5/5. progressing      Plan  Patient would benefit from: skilled physical therapy  Planned modality interventions: cryotherapy, thermotherapy: hydrocollator packs and unattended electrical stimulation    Planned therapy interventions: IASTM, joint mobilization, kinesiology taping, manual therapy, abdominal trunk stabilization, neuromuscular re-education, patient/caregiver education, postural training, balance, body mechanics training, home exercise program, strengthening, stretching, therapeutic activities and therapeutic exercise    Frequency: 1-2x week  Duration in weeks: 2  Treatment plan discussed with: patient        Subjective Evaluation    History of Present Illness  Mechanism of injury: Patient feels that her leg pain is steadily improving with PT treatment. Patient will be away on vacation after next week; thinking that she's ready to discharge then.     From IE:  Patient missed the curb and fell immediately onto L side. Patient was taken to the hospital, CT of the head negative (as patient has had a small head strike), x ray negative for fractures, bruising at the upper thigh.  Patient reports that her pain is steadily improving; however, still painful with sitting. Patient's goal is to get better for her upcoming vacations in end of July and 2nd week of August.     Patient initially ambulating with RW, and has progressed to using SPC at this time.  Patient currently lives with spouse in 2 story home with bedroom on the 2nd floor, bathroom on the first floor. Patient has only recently started ascending/descending the stairs; still nervous with descending > ascending stairs.   Patient Goals  Patient goals for therapy: decreased pain, improved balance, increased motion, increased strength and independence with ADLs/IADLs    Pain  Current pain ratin  At best pain ratin  At worst  "pain rating: 3  Location: L posterior thigh  Quality: sharp, radiating, throbbing, cramping, discomfort and dull ache  Relieving factors: ice and heat  Aggravating factors: sitting, stair climbing and walking  Progression: no change    Social Support  Steps to enter house: yes  Stairs in house: yes   Lives in: multiple-level home  Lives with: spouse    Employment status: not working    Diagnostic Tests  X-ray: normal  CT scan: normal  Treatments  No previous or current treatments      Objective     Lumbar Screen  Lumbar range of motion within normal limits with the following exceptions:WFL still tight at R hamstring compared to L with bending    Neurological Testing     Sensation     Hip   Left Hip   Intact: light touch    Active Range of Motion   Left Hip   Flexion: 100 degrees   Abduction: 40 degrees   External rotation (90/90): 30 degrees with pain  Internal rotation (90/90): 30 degrees with pain    Right Hip   Normal active range of motion    Strength/Myotome Testing     Left Hip   Planes of Motion   Flexion: 4-  Extension: 4  Abduction: 4  Adduction: 4    Right Hip   Normal muscle strength    Tests     Left Hip   90/90 SLR: Negative.   SLR: Negative.              Precautions: FALL RISK  Low tolerance to pain      Manuals 7/7 7/9 7/14 7/16                                       Ther Ex              Warm up:       nustep L2 10 min L3 10 min L3 10 min  L3 10'                 Standing TE:       SLR x 3 2x10 1.5 lbs 2x10 2 lbs 2x10 2lbs 20x   Ham curls 2x10 1.5 lbs 2x10 2 lbs 2x10 2lbs 20x   HR 20x 20x 20x  20x   marches 2x10 1.5 lbs 2x10 2 lba 2x10 2lbs 20x          Step ups FW 10x  Stairway  10x stairway 15x stairway  15x stairway       Tandem walking foam 2x10' ballet bar   Side steps 8x 10 ft  8x 10 ft YTB 8x 10 ft Foam today 8x 10'- NV foam and TB if able   Monster walk 8x 10 ft 8x 10 ft YTB 8x 10 ft YTB 8x 10'          Seated TE:              LAQ 15x:05\"hd 15x:05\" hd  15x:05\" hd  2# 15x:05   Ball squeeze " "15x:05\" 15x:5\"HD 15x:5\"HD 15x:05    Hip abd TB Red 15x:03\" hd  RED 15x:3\"hd RED 15x:3\"hd RED 15x:03                 Mat TE:       90/90 hamstring stretch, with towel 2x:10  4x:10\"hd 4x:10\"hd 4x:10   SKTC with towel 2x:10 4x:10\"hd 4x:10\"hd 4x:10   SLR flex       SAQ 15x:05\"hd 1.5 lbs 2x10 - 2.5 lbs 2x10 :05                               Ther Activity                     Gait Training       With or without assisted device              Modalities       CP vs MH PRN                         "

## 2025-07-21 ENCOUNTER — OFFICE VISIT (OUTPATIENT)
Dept: PHYSICAL THERAPY | Age: 70
End: 2025-07-21
Payer: MEDICARE

## 2025-07-21 DIAGNOSIS — R26.2 AMBULATORY DYSFUNCTION: Primary | ICD-10-CM

## 2025-07-21 PROCEDURE — 97112 NEUROMUSCULAR REEDUCATION: CPT

## 2025-07-21 PROCEDURE — 97110 THERAPEUTIC EXERCISES: CPT

## 2025-07-21 NOTE — PROGRESS NOTES
"Daily Note     Today's date: 2025  Patient name: Judit Sawyer  : 1955  MRN: [id number]  Referring provider: Yue Bangura PA-C  Dx:   Encounter Diagnosis     ICD-10-CM    1. Ambulatory dysfunction  R26.2             Start Time: 955  Stop Time: 1050  Total time in clinic (min): 55 minutes    Subjective: patient reports she is doing well and offers no complaints today. She reports this past weekend she walked a mile with good tolerance since the weather was nice.       Objective: See treatment diary below      Assessment: Patient tolerated treatment and therapy well today. Cueing needed today for form of 90-90 hamstring stretching with good carry over following. Progressed side steps with addition of tb per poc with good performance. Patient is appropriately challenged with exercises today and completes them without symptom exacerbation. Patient will cotinue to benefit from skilled physical therapy.         Plan: Continue per plan of care.  Progress treatment as tolerated.       Precautions: FALL RISK  Low tolerance to pain      Manuals                                            Ther Ex                Warm up:        nustep L2 10 min L3 10 min L3 10 min  L3 10' L3 10'                   Standing TE:        SLR x 3 2x10 1.5 lbs 2x10 2 lbs 2x10 2lbs 20x 2lbs 20x   Ham curls 2x10 1.5 lbs 2x10 2 lbs 2x10 2lbs 20x 2lbs 20x   HR 20x 20x 20x  20x 20x   marches 2x10 1.5 lbs 2x10 2 lba 2x10 2lbs 20x 2lbs 20x           Step ups FW 10x  Stairway  10x stairway 15x stairway  15x stairway 15x stairway       Tandem walking foam 2x10' ballet bar Tandem walking foam 2x10' ballet bar   Side steps 8x 10 ft  8x 10 ft YTB 8x 10 ft Foam today 8x 10'- NV foam and TB if able foam and YTB 8x 10'   Monster walk 8x 10 ft 8x 10 ft YTB 8x 10 ft YTB 8x 10' YTB 8x 10'           Seated TE:                LAQ 15x:05\"hd 15x:05\" hd  15x:05\" hd  2# 15x:05 2# 15x:05   Ball squeeze 15x:05\" 15x:5\"HD 15x:5\"HD " "15x:05  15x:05    Hip abd TB Red 15x:03\" hd  RED 15x:3\"hd RED 15x:3\"hd RED 15x:03 RED 15x:03                   Mat TE:        90/90 hamstring stretch, with towel 2x:10  4x:10\"hd 4x:10\"hd 4x:10 4x:10   SKTC with towel 2x:10 4x:10\"hd 4x:10\"hd 4x:10 4x:10   SLR flex        SAQ 15x:05\"hd 1.5 lbs 2x10 - 2.5 lbs 2x10 :05 2.5 lbs 2x10 :05                                   Ther Activity                        Gait Training        With or without assisted device                Modalities        CP vs MH PRN                                     "

## 2025-07-23 ENCOUNTER — OFFICE VISIT (OUTPATIENT)
Dept: PHYSICAL THERAPY | Age: 70
End: 2025-07-23
Payer: MEDICARE

## 2025-07-23 DIAGNOSIS — R26.2 AMBULATORY DYSFUNCTION: Primary | ICD-10-CM

## 2025-07-23 PROCEDURE — 97110 THERAPEUTIC EXERCISES: CPT | Performed by: PHYSICAL THERAPIST

## 2025-07-23 NOTE — PROGRESS NOTES
"Daily Note     Today's date: 2025  Patient name: Judit Sawyer  : 1955  MRN: [id number]  Referring provider: Yue Bangura PA-C  Dx:   Encounter Diagnosis     ICD-10-CM    1. Ambulatory dysfunction  R26.2                      Subjective: Patient reports ***      Objective: See treatment diary below      Assessment: ***      Plan: Continue per plan of care.      Precautions: FALL RISK  Low tolerance to pain      Manuals                                       Ther Ex              Warm up:       nustep L3 10 min L3 10 min L3 10 min  L3 10'                 Standing TE:       SLR x 3 2# 20x 1.5 lbs 2x10 2 lbs 2x10 2lbs 20x   Ham curls 2# 20x 1.5 lbs 2x10 2 lbs 2x10 2lbs 20x   HR 20x 20x 20x  20x   marches 2# 20x 1.5 lbs 2x10 2 lba 2x10 2lbs 20x          Step ups FW 15x stairway 10x stairway 15x stairway  15x stairway   Tandem walking foam, ballet bar    Tandem walking foam 2x10' ballet bar   Side steps YTB 8x 10' 8x 10 ft YTB 8x 10 ft foam and YTB 8x 10'   Monster walk YTB 8x 10' 8x 10 ft YTB 8x 10 ft YTB 8x 10'          Seated TE:              LAQ 2# 15x:05 15x:05\" hd  15x:05\" hd  2# 15x:05   Ball squeeze 15x:05 15x:5\"HD 15x:5\"HD 15x:05    Hip abd TB RED 15x:03 RED 15x:3\"hd RED 15x:3\"hd RED 15x:03                 Mat TE:       90/90 hamstring stretch, with towel 4x:10 4x:10\"hd 4x:10\"hd 4x:10   SKTC with towel 4x:10 4x:10\"hd 4x:10\"hd 4x:10   SLR flex       SAQ 2.5 lbs 2x10 :05 1.5 lbs 2x10 - 2.5 lbs 2x10 :05                               Ther Activity                     Gait Training       With or without assisted device              Modalities       CP vs MH PRN                                     "

## 2025-07-23 NOTE — PROGRESS NOTES
PT Discharge    Today's date: 2025  Patient name: Judit Sawyer  : 1955  MRN: [id number]  Referring provider: Yue Bangura PA-C  Dx:   Encounter Diagnosis     ICD-10-CM    1. Ambulatory dysfunction  R26.2                      Assessment  Impairments: abnormal gait, abnormal or restricted ROM, activity intolerance, impaired balance, impaired physical strength, lacks appropriate home exercise program, pain with function, weight-bearing intolerance, poor posture  and poor body mechanics  Functional limitations: Patient's pain has reduced with stair climbing, walking, standing, sit<>stand transfers; however, cautioned patient with heavy lifting/bending to ease into her previous level of activity as she's able.    Assessment details: Patient presents with improved LE strength, improved balance, ROM and decrease in pain. PT reviewed HEP and recommended patient to continue with her HEP and to return to PT if needed if her pain returns or any questions with her HEP.   Understanding of Dx/Px/POC: good     Prognosis: good    Goals  Short Term goals - 4 weeks  1.  Patient will be independent HEP.  met  2.  Patient will report a 50% decrease in pain complaints.  met  3.  Increase strength 1/2 grade. progressing  4.  Increase ROM 5-10 degrees. met  5.  Wean from assisted device use. met    Long Term goals - 8 weeks  1.  Patient will report elimination of pain complaints. met  2.  Patient will return to all work related activities without restriction. met  3.  Patient will return to all recreational activities without restriction. met  4.  ROM WFL. met  5.  Strength 5/5. met      Plan  Patient would benefit from: skilled physical therapy  Planned modality interventions: cryotherapy, thermotherapy: hydrocollator packs and unattended electrical stimulation    Planned therapy interventions: IASTM, joint mobilization, kinesiology taping, manual therapy, abdominal trunk stabilization, neuromuscular  re-education, patient/caregiver education, postural training, balance, body mechanics training, home exercise program, strengthening, stretching, therapeutic activities and therapeutic exercise    Frequency: 1-2x week  Duration in weeks: 2  Treatment plan discussed with: patient        Subjective Evaluation    History of Present Illness  Mechanism of injury: Patient ready to discharge to [facility], minimal pain in the LE. Patient has been taking it gentle with the leg with higher level activities. Plans to ease into prior activities.   Patient Goals  Patient goals for therapy: decreased pain, improved balance, increased motion, increased strength and independence with ADLs/IADLs    Pain  Current pain ratin  At best pain ratin  At worst pain ratin  Location: L posterior thigh  Quality: sharp, radiating, throbbing, cramping, discomfort and dull ache  Relieving factors: ice and heat  Aggravating factors: sitting, stair climbing and walking  Progression: no change    Social Support  Steps to enter house: yes  Stairs in house: yes   Lives in: multiple-level home  Lives with: spouse    Employment status: not working    Diagnostic Tests  X-ray: normal  CT scan: normal  Treatments  No previous or current treatments      Objective     Lumbar Screen  Lumbar range of motion within normal limits with the following exceptions:Very minimal hamstring stretch/tension with forward bend.     Neurological Testing     Sensation     Hip   Left Hip   Intact: light touch    Active Range of Motion   Left Hip   Flexion: 120 degrees   Abduction: 45 degrees   External rotation (90/90): 45 degrees   Internal rotation (90/90): 30 degrees     Right Hip   Normal active range of motion    Strength/Myotome Testing     Left Hip   Normal muscle strength  Planes of Motion   Flexion: 5  Extension: 4+  Abduction: 4+  Adduction: 5    Right Hip   Normal muscle strength    Tests     Left Hip   90/90 SLR: Negative.   SLR: Negative.             "  Precautions: FALL RISK  Low tolerance to pain      Manuals 7/23 7/9 7/14 7/21                                      Ther Ex              Warm up:       nustep L1 10 min Bike today L3 10 min L3 10 min  L3 10'                 Standing TE:       SLR x 3 2# 2x10 1.5 lbs 2x10 2 lbs 2x10 2lbs 20x   Ham curls 2# 2x10 1.5 lbs 2x10 2 lbs 2x10 2lbs 20x   HR 20x 20x 20x  20x   marches 2# 2x10 1.5 lbs 2x10 2 lba 2x10 2lbs 20x          Step ups FW 15x Stairway  10x stairway 15x stairway  15x stairway       Tandem walking foam 2x10' ballet bar   Side steps 8x 10 ft  YTB foam pad 8x 10 ft YTB 8x 10 ft foam and YTB 8x 10'   Monster walk 8x 10 ft YTB  8x 10 ft YTB 8x 10 ft YTB 8x 10'          Seated TE:              LAQ - 15x:05\" hd  15x:05\" hd  2# 15x:05   Ball squeeze - 15x:5\"HD 15x:5\"HD 15x:05    Hip abd TB - RED 15x:3\"hd RED 15x:3\"hd RED 15x:03                 Mat TE:       90/90 hamstring stretch, with towel 5x:10  4x:10\"hd 4x:10\"hd 4x:10   SKTC with towel 5x:10 4x:10\"hd 4x:10\"hd 4x:10   SLR flex 10x       SAQ -  1.5 lbs 2x10 - 2.5 lbs 2x10 :05   bridges 10x                            Ther Activity                     Gait Training       With or without assisted device              Modalities       CP vs MH PRN                         " 3/10 stomach discomfort because of diverticulitis especially while stressed per pt

## 2025-07-23 NOTE — HOME EXERCISE EDUCATION
Program_ID:[id number]   Access Code: [email or web address]  URL: [email or web address]  Date: 07-  Prepared By: Gaby Aleman    Program Notes      Exercises      - Hooklying Single Knee to Chest Stretch with Towel - 1-2 x daily -  x weekly -  sets - 5 reps - 10 sec hold      - Supine Hamstring Stretch - 1-2 x daily -  x weekly -  sets - 5 reps - 10 sec hold      - Seated Long Arc Quad - 1 x daily -  x weekly -  sets - 10 reps - 3 sec hold      - Supine Bridge - 1 x daily -  x weekly - 2 sets - 10 reps      - Supine Active Straight Leg Raise - 1 x daily -  x weekly - 1-2 sets - 10 reps      - Standing Hip Abduction with Counter Support - 1 x daily -  x weekly - 2-3 sets - 10 reps      - Seated Hamstring Stretch - 1 x daily -  x weekly -  sets - 2-3 reps - 10-15 hold      - Heel Raises with Counter Support - 1 x daily -  x weekly - 2-3 sets - 10 reps      - Side Stepping with Counter Support -  x daily - 1-3 x weekly - 3 sets - 10 reps      - Tandem Walking with Counter Support - 1 x daily - 7 x weekly - 3 sets - 10 reps      - Standing Hip Extension with Counter Support - 1 x daily -  x weekly - 2-3 sets - 10 reps      - Standing Knee Flexion AROM with Chair Support - 1 x daily -  x weekly - 2-3 sets - 10 reps

## 2025-07-24 ENCOUNTER — APPOINTMENT (OUTPATIENT)
Dept: PHYSICAL THERAPY | Age: 70
End: 2025-07-24
Payer: MEDICARE

## 2025-08-04 PROCEDURE — 87186 SC STD MICRODIL/AGAR DIL: CPT | Performed by: OTOLARYNGOLOGY

## 2025-08-04 PROCEDURE — 87070 CULTURE OTHR SPECIMN AEROBIC: CPT | Performed by: OTOLARYNGOLOGY

## 2025-08-04 PROCEDURE — 87205 SMEAR GRAM STAIN: CPT | Performed by: OTOLARYNGOLOGY

## 2025-08-04 PROCEDURE — 87077 CULTURE AEROBIC IDENTIFY: CPT | Performed by: OTOLARYNGOLOGY
